# Patient Record
Sex: MALE | Race: OTHER | NOT HISPANIC OR LATINO
[De-identification: names, ages, dates, MRNs, and addresses within clinical notes are randomized per-mention and may not be internally consistent; named-entity substitution may affect disease eponyms.]

---

## 2017-01-30 ENCOUNTER — FORM ENCOUNTER (OUTPATIENT)
Age: 62
End: 2017-01-30

## 2017-01-31 ENCOUNTER — OUTPATIENT (OUTPATIENT)
Dept: OUTPATIENT SERVICES | Facility: HOSPITAL | Age: 62
LOS: 1 days | End: 2017-01-31
Payer: MEDICAID

## 2017-01-31 ENCOUNTER — APPOINTMENT (OUTPATIENT)
Dept: NUCLEAR MEDICINE | Facility: IMAGING CENTER | Age: 62
End: 2017-01-31

## 2017-01-31 ENCOUNTER — APPOINTMENT (OUTPATIENT)
Dept: CT IMAGING | Facility: IMAGING CENTER | Age: 62
End: 2017-01-31

## 2017-01-31 DIAGNOSIS — C22.1 INTRAHEPATIC BILE DUCT CARCINOMA: ICD-10-CM

## 2017-01-31 PROCEDURE — 74177 CT ABD & PELVIS W/CONTRAST: CPT

## 2017-03-03 ENCOUNTER — OUTPATIENT (OUTPATIENT)
Dept: OUTPATIENT SERVICES | Facility: HOSPITAL | Age: 62
LOS: 1 days | Discharge: HOME | End: 2017-03-03

## 2017-03-03 DIAGNOSIS — C22.1 INTRAHEPATIC BILE DUCT CARCINOMA: ICD-10-CM

## 2017-03-03 DIAGNOSIS — E55.9 VITAMIN D DEFICIENCY, UNSPECIFIED: ICD-10-CM

## 2017-03-06 ENCOUNTER — APPOINTMENT (OUTPATIENT)
Dept: HEMATOLOGY ONCOLOGY | Facility: CLINIC | Age: 62
End: 2017-03-06

## 2017-04-04 ENCOUNTER — RESULT REVIEW (OUTPATIENT)
Age: 62
End: 2017-04-04

## 2017-04-05 ENCOUNTER — APPOINTMENT (OUTPATIENT)
Dept: HEMATOLOGY ONCOLOGY | Facility: CLINIC | Age: 62
End: 2017-04-05

## 2017-04-05 ENCOUNTER — OUTPATIENT (OUTPATIENT)
Dept: OUTPATIENT SERVICES | Facility: HOSPITAL | Age: 62
LOS: 1 days | Discharge: HOME | End: 2017-04-05

## 2017-04-05 VITALS
HEART RATE: 88 BPM | TEMPERATURE: 97.8 F | OXYGEN SATURATION: 99 % | DIASTOLIC BLOOD PRESSURE: 80 MMHG | WEIGHT: 159.39 LBS | SYSTOLIC BLOOD PRESSURE: 136 MMHG | BODY MASS INDEX: 24.24 KG/M2 | RESPIRATION RATE: 16 BRPM

## 2017-04-05 DIAGNOSIS — C22.1 INTRAHEPATIC BILE DUCT CARCINOMA: ICD-10-CM

## 2017-04-05 LAB
BASOPHILS # BLD AUTO: 0 K/UL — SIGNIFICANT CHANGE UP (ref 0–0.2)
BASOPHILS NFR BLD AUTO: 0.5 % — SIGNIFICANT CHANGE UP (ref 0–2)
CEA SERPL-MCNC: 2.9 NG/ML
EOSINOPHIL # BLD AUTO: 0.2 K/UL — SIGNIFICANT CHANGE UP (ref 0–0.5)
EOSINOPHIL NFR BLD AUTO: 2.8 % — SIGNIFICANT CHANGE UP (ref 0–6)
HCT VFR BLD CALC: 41 % — SIGNIFICANT CHANGE UP (ref 39–50)
HGB BLD-MCNC: 14.3 G/DL — SIGNIFICANT CHANGE UP (ref 13–17)
LYMPHOCYTES # BLD AUTO: 1.5 K/UL — SIGNIFICANT CHANGE UP (ref 1–3.3)
LYMPHOCYTES # BLD AUTO: 21.6 % — SIGNIFICANT CHANGE UP (ref 13–44)
MCHC RBC-ENTMCNC: 31.2 PG — SIGNIFICANT CHANGE UP (ref 27–34)
MCHC RBC-ENTMCNC: 35 G/DL — SIGNIFICANT CHANGE UP (ref 32–36)
MCV RBC AUTO: 89 FL — SIGNIFICANT CHANGE UP (ref 80–100)
MONOCYTES # BLD AUTO: 0.8 K/UL — SIGNIFICANT CHANGE UP (ref 0–0.9)
MONOCYTES NFR BLD AUTO: 11 % — SIGNIFICANT CHANGE UP (ref 2–14)
NEUTROPHILS # BLD AUTO: 4.4 K/UL — SIGNIFICANT CHANGE UP (ref 1.8–7.4)
NEUTROPHILS NFR BLD AUTO: 64 % — SIGNIFICANT CHANGE UP (ref 43–77)
PLATELET # BLD AUTO: 199 K/UL — SIGNIFICANT CHANGE UP (ref 150–400)
RBC # BLD: 4.6 M/UL — SIGNIFICANT CHANGE UP (ref 4.2–5.8)
RBC # FLD: 11.9 % — SIGNIFICANT CHANGE UP (ref 10.3–14.5)
WBC # BLD: 6.9 K/UL — SIGNIFICANT CHANGE UP (ref 3.8–10.5)
WBC # FLD AUTO: 6.9 K/UL — SIGNIFICANT CHANGE UP (ref 3.8–10.5)

## 2017-04-06 LAB
ALBUMIN SERPL ELPH-MCNC: 3.9 G/DL
ALP BLD-CCNC: 110 U/L
ALT SERPL-CCNC: 17 U/L
ANION GAP SERPL CALC-SCNC: 14 MMOL/L
AST SERPL-CCNC: 17 U/L
BILIRUB SERPL-MCNC: 0.3 MG/DL
BUN SERPL-MCNC: 14 MG/DL
CALCIUM SERPL-MCNC: 8.9 MG/DL
CANCER AG19-9 SERPL-ACNC: 34.3 U/ML
CHLORIDE SERPL-SCNC: 104 MMOL/L
CO2 SERPL-SCNC: 25 MMOL/L
CREAT SERPL-MCNC: 0.7 MG/DL
GLUCOSE SERPL-MCNC: 115 MG/DL
POTASSIUM SERPL-SCNC: 4.3 MMOL/L
PROT SERPL-MCNC: 6.9 G/DL
SODIUM SERPL-SCNC: 143 MMOL/L

## 2017-07-04 ENCOUNTER — FORM ENCOUNTER (OUTPATIENT)
Age: 62
End: 2017-07-04

## 2017-07-05 ENCOUNTER — APPOINTMENT (OUTPATIENT)
Dept: CT IMAGING | Facility: IMAGING CENTER | Age: 62
End: 2017-07-05

## 2017-07-05 ENCOUNTER — OUTPATIENT (OUTPATIENT)
Dept: OUTPATIENT SERVICES | Facility: HOSPITAL | Age: 62
LOS: 1 days | End: 2017-07-05
Payer: MEDICAID

## 2017-07-05 DIAGNOSIS — C22.1 INTRAHEPATIC BILE DUCT CARCINOMA: ICD-10-CM

## 2017-07-05 PROCEDURE — 82565 ASSAY OF CREATININE: CPT

## 2017-07-05 PROCEDURE — 71260 CT THORAX DX C+: CPT

## 2017-07-05 PROCEDURE — 74177 CT ABD & PELVIS W/CONTRAST: CPT

## 2017-07-10 ENCOUNTER — OUTPATIENT (OUTPATIENT)
Dept: OUTPATIENT SERVICES | Facility: HOSPITAL | Age: 62
LOS: 1 days | Discharge: HOME | End: 2017-07-10

## 2017-07-10 ENCOUNTER — APPOINTMENT (OUTPATIENT)
Dept: HEMATOLOGY ONCOLOGY | Facility: CLINIC | Age: 62
End: 2017-07-10

## 2017-07-10 DIAGNOSIS — C22.1 INTRAHEPATIC BILE DUCT CARCINOMA: ICD-10-CM

## 2017-07-19 ENCOUNTER — RESULT REVIEW (OUTPATIENT)
Age: 62
End: 2017-07-19

## 2017-07-19 ENCOUNTER — APPOINTMENT (OUTPATIENT)
Dept: HEMATOLOGY ONCOLOGY | Facility: CLINIC | Age: 62
End: 2017-07-19

## 2017-07-19 VITALS
HEART RATE: 78 BPM | TEMPERATURE: 98.1 F | WEIGHT: 163.14 LBS | RESPIRATION RATE: 16 BRPM | DIASTOLIC BLOOD PRESSURE: 86 MMHG | OXYGEN SATURATION: 96 % | SYSTOLIC BLOOD PRESSURE: 162 MMHG | BODY MASS INDEX: 24.81 KG/M2

## 2017-07-19 LAB
BASOPHILS # BLD AUTO: 0 K/UL — SIGNIFICANT CHANGE UP (ref 0–0.2)
BASOPHILS NFR BLD AUTO: 0.6 % — SIGNIFICANT CHANGE UP (ref 0–2)
EOSINOPHIL # BLD AUTO: 0.3 K/UL — SIGNIFICANT CHANGE UP (ref 0–0.5)
EOSINOPHIL NFR BLD AUTO: 5.2 % — SIGNIFICANT CHANGE UP (ref 0–6)
HCT VFR BLD CALC: 40.8 % — SIGNIFICANT CHANGE UP (ref 39–50)
HGB BLD-MCNC: 13.7 G/DL — SIGNIFICANT CHANGE UP (ref 13–17)
LYMPHOCYTES # BLD AUTO: 2 K/UL — SIGNIFICANT CHANGE UP (ref 1–3.3)
LYMPHOCYTES # BLD AUTO: 33.1 % — SIGNIFICANT CHANGE UP (ref 13–44)
MCHC RBC-ENTMCNC: 29.3 PG — SIGNIFICANT CHANGE UP (ref 27–34)
MCHC RBC-ENTMCNC: 33.5 G/DL — SIGNIFICANT CHANGE UP (ref 32–36)
MCV RBC AUTO: 87.4 FL — SIGNIFICANT CHANGE UP (ref 80–100)
MONOCYTES # BLD AUTO: 0.6 K/UL — SIGNIFICANT CHANGE UP (ref 0–0.9)
MONOCYTES NFR BLD AUTO: 10.3 % — SIGNIFICANT CHANGE UP (ref 2–14)
NEUTROPHILS # BLD AUTO: 3 K/UL — SIGNIFICANT CHANGE UP (ref 1.8–7.4)
NEUTROPHILS NFR BLD AUTO: 50.8 % — SIGNIFICANT CHANGE UP (ref 43–77)
PLATELET # BLD AUTO: 212 K/UL — SIGNIFICANT CHANGE UP (ref 150–400)
RBC # BLD: 4.66 M/UL — SIGNIFICANT CHANGE UP (ref 4.2–5.8)
RBC # FLD: 13.3 % — SIGNIFICANT CHANGE UP (ref 10.3–14.5)
WBC # BLD: 6 K/UL — SIGNIFICANT CHANGE UP (ref 3.8–10.5)
WBC # FLD AUTO: 6 K/UL — SIGNIFICANT CHANGE UP (ref 3.8–10.5)

## 2017-11-22 ENCOUNTER — OUTPATIENT (OUTPATIENT)
Dept: OUTPATIENT SERVICES | Facility: HOSPITAL | Age: 62
LOS: 1 days | Discharge: HOME | End: 2017-11-22

## 2017-11-22 DIAGNOSIS — C22.1 INTRAHEPATIC BILE DUCT CARCINOMA: ICD-10-CM

## 2017-11-22 DIAGNOSIS — E55.9 VITAMIN D DEFICIENCY, UNSPECIFIED: ICD-10-CM

## 2017-11-24 ENCOUNTER — OTHER (OUTPATIENT)
Age: 62
End: 2017-11-24

## 2017-11-29 ENCOUNTER — RESULT REVIEW (OUTPATIENT)
Age: 62
End: 2017-11-29

## 2017-11-29 ENCOUNTER — APPOINTMENT (OUTPATIENT)
Dept: HEMATOLOGY ONCOLOGY | Facility: CLINIC | Age: 62
End: 2017-11-29
Payer: MEDICAID

## 2017-11-29 VITALS
OXYGEN SATURATION: 100 % | HEART RATE: 74 BPM | DIASTOLIC BLOOD PRESSURE: 84 MMHG | SYSTOLIC BLOOD PRESSURE: 153 MMHG | RESPIRATION RATE: 16 BRPM | BODY MASS INDEX: 24.64 KG/M2 | TEMPERATURE: 98 F | WEIGHT: 162.04 LBS

## 2017-11-29 LAB
BASOPHILS # BLD AUTO: 0.1 K/UL — SIGNIFICANT CHANGE UP (ref 0–0.2)
BASOPHILS NFR BLD AUTO: 1 % — SIGNIFICANT CHANGE UP (ref 0–2)
EOSINOPHIL # BLD AUTO: 0.4 K/UL — SIGNIFICANT CHANGE UP (ref 0–0.5)
EOSINOPHIL NFR BLD AUTO: 4.5 % — SIGNIFICANT CHANGE UP (ref 0–6)
HCT VFR BLD CALC: 42.2 % — SIGNIFICANT CHANGE UP (ref 39–50)
HGB BLD-MCNC: 14.3 G/DL — SIGNIFICANT CHANGE UP (ref 13–17)
LYMPHOCYTES # BLD AUTO: 2 K/UL — SIGNIFICANT CHANGE UP (ref 1–3.3)
LYMPHOCYTES # BLD AUTO: 26.3 % — SIGNIFICANT CHANGE UP (ref 13–44)
MCHC RBC-ENTMCNC: 30.3 PG — SIGNIFICANT CHANGE UP (ref 27–34)
MCHC RBC-ENTMCNC: 33.9 G/DL — SIGNIFICANT CHANGE UP (ref 32–36)
MCV RBC AUTO: 89.5 FL — SIGNIFICANT CHANGE UP (ref 80–100)
MONOCYTES # BLD AUTO: 0.6 K/UL — SIGNIFICANT CHANGE UP (ref 0–0.9)
MONOCYTES NFR BLD AUTO: 7.2 % — SIGNIFICANT CHANGE UP (ref 2–14)
NEUTROPHILS # BLD AUTO: 4.7 K/UL — SIGNIFICANT CHANGE UP (ref 1.8–7.4)
NEUTROPHILS NFR BLD AUTO: 61 % — SIGNIFICANT CHANGE UP (ref 43–77)
PLATELET # BLD AUTO: 213 K/UL — SIGNIFICANT CHANGE UP (ref 150–400)
RBC # BLD: 4.72 M/UL — SIGNIFICANT CHANGE UP (ref 4.2–5.8)
RBC # FLD: 11.8 % — SIGNIFICANT CHANGE UP (ref 10.3–14.5)
WBC # BLD: 7.7 K/UL — SIGNIFICANT CHANGE UP (ref 3.8–10.5)
WBC # FLD AUTO: 7.7 K/UL — SIGNIFICANT CHANGE UP (ref 3.8–10.5)

## 2017-11-29 PROCEDURE — 99214 OFFICE O/P EST MOD 30 MIN: CPT

## 2017-12-01 ENCOUNTER — FORM ENCOUNTER (OUTPATIENT)
Age: 62
End: 2017-12-01

## 2017-12-02 ENCOUNTER — APPOINTMENT (OUTPATIENT)
Dept: ULTRASOUND IMAGING | Facility: IMAGING CENTER | Age: 62
End: 2017-12-02
Payer: MEDICAID

## 2017-12-02 ENCOUNTER — OUTPATIENT (OUTPATIENT)
Dept: OUTPATIENT SERVICES | Facility: HOSPITAL | Age: 62
LOS: 1 days | End: 2017-12-02
Payer: MEDICAID

## 2017-12-02 DIAGNOSIS — C22.1 INTRAHEPATIC BILE DUCT CARCINOMA: ICD-10-CM

## 2017-12-02 PROCEDURE — 76882 US LMTD JT/FCL EVL NVASC XTR: CPT

## 2017-12-02 PROCEDURE — 76882 US LMTD JT/FCL EVL NVASC XTR: CPT | Mod: 26,LT

## 2018-04-06 ENCOUNTER — OUTPATIENT (OUTPATIENT)
Dept: OUTPATIENT SERVICES | Facility: HOSPITAL | Age: 63
LOS: 1 days | Discharge: HOME | End: 2018-04-06

## 2018-04-06 DIAGNOSIS — E55.9 VITAMIN D DEFICIENCY, UNSPECIFIED: ICD-10-CM

## 2018-04-06 DIAGNOSIS — C22.1 INTRAHEPATIC BILE DUCT CARCINOMA: ICD-10-CM

## 2018-04-11 ENCOUNTER — RESULT REVIEW (OUTPATIENT)
Age: 63
End: 2018-04-11

## 2018-04-11 ENCOUNTER — OTHER (OUTPATIENT)
Age: 63
End: 2018-04-11

## 2018-04-11 ENCOUNTER — APPOINTMENT (OUTPATIENT)
Dept: HEMATOLOGY ONCOLOGY | Facility: CLINIC | Age: 63
End: 2018-04-11
Payer: MEDICAID

## 2018-04-11 VITALS
SYSTOLIC BLOOD PRESSURE: 148 MMHG | BODY MASS INDEX: 25.7 KG/M2 | OXYGEN SATURATION: 99 % | TEMPERATURE: 98.7 F | WEIGHT: 168.98 LBS | HEART RATE: 94 BPM | RESPIRATION RATE: 16 BRPM | DIASTOLIC BLOOD PRESSURE: 84 MMHG

## 2018-04-11 LAB
BASOPHILS # BLD AUTO: 0 K/UL — SIGNIFICANT CHANGE UP (ref 0–0.2)
BASOPHILS NFR BLD AUTO: 0.2 % — SIGNIFICANT CHANGE UP (ref 0–2)
EOSINOPHIL # BLD AUTO: 0.3 K/UL — SIGNIFICANT CHANGE UP (ref 0–0.5)
EOSINOPHIL NFR BLD AUTO: 3.9 % — SIGNIFICANT CHANGE UP (ref 0–6)
HCT VFR BLD CALC: 41.7 % — SIGNIFICANT CHANGE UP (ref 39–50)
HGB BLD-MCNC: 14.4 G/DL — SIGNIFICANT CHANGE UP (ref 13–17)
LYMPHOCYTES # BLD AUTO: 1.7 K/UL — SIGNIFICANT CHANGE UP (ref 1–3.3)
LYMPHOCYTES # BLD AUTO: 21.4 % — SIGNIFICANT CHANGE UP (ref 13–44)
MCHC RBC-ENTMCNC: 30.5 PG — SIGNIFICANT CHANGE UP (ref 27–34)
MCHC RBC-ENTMCNC: 34.6 G/DL — SIGNIFICANT CHANGE UP (ref 32–36)
MCV RBC AUTO: 87.9 FL — SIGNIFICANT CHANGE UP (ref 80–100)
MONOCYTES # BLD AUTO: 0.7 K/UL — SIGNIFICANT CHANGE UP (ref 0–0.9)
MONOCYTES NFR BLD AUTO: 8.7 % — SIGNIFICANT CHANGE UP (ref 2–14)
NEUTROPHILS # BLD AUTO: 5.3 K/UL — SIGNIFICANT CHANGE UP (ref 1.8–7.4)
NEUTROPHILS NFR BLD AUTO: 65.7 % — SIGNIFICANT CHANGE UP (ref 43–77)
PLATELET # BLD AUTO: 214 K/UL — SIGNIFICANT CHANGE UP (ref 150–400)
RBC # BLD: 4.74 M/UL — SIGNIFICANT CHANGE UP (ref 4.2–5.8)
RBC # FLD: 12.2 % — SIGNIFICANT CHANGE UP (ref 10.3–14.5)
WBC # BLD: 8 K/UL — SIGNIFICANT CHANGE UP (ref 3.8–10.5)
WBC # FLD AUTO: 8 K/UL — SIGNIFICANT CHANGE UP (ref 3.8–10.5)

## 2018-04-11 PROCEDURE — 99214 OFFICE O/P EST MOD 30 MIN: CPT

## 2018-04-17 LAB
ALBUMIN SERPL ELPH-MCNC: 3.8 G/DL
ALP BLD-CCNC: 105 U/L
ALT SERPL-CCNC: 17 U/L
ANION GAP SERPL CALC-SCNC: 9 MMOL/L
AST SERPL-CCNC: 17 U/L
BILIRUB SERPL-MCNC: 0.3 MG/DL
BUN SERPL-MCNC: 13 MG/DL
CALCIUM SERPL-MCNC: 8.8 MG/DL
CANCER AG19-9 SERPL-ACNC: 39.2 U/ML
CHLORIDE SERPL-SCNC: 103 MMOL/L
CO2 SERPL-SCNC: 29 MMOL/L
CREAT SERPL-MCNC: 0.8 MG/DL
GLUCOSE SERPL-MCNC: 89 MG/DL
POTASSIUM SERPL-SCNC: 4.8 MMOL/L
PROT SERPL-MCNC: 7.3 G/DL
SODIUM SERPL-SCNC: 141 MMOL/L

## 2018-04-18 ENCOUNTER — MESSAGE (OUTPATIENT)
Age: 63
End: 2018-04-18

## 2018-04-26 ENCOUNTER — APPOINTMENT (OUTPATIENT)
Dept: SURGICAL ONCOLOGY | Facility: CLINIC | Age: 63
End: 2018-04-26
Payer: MEDICAID

## 2018-04-26 VITALS
HEART RATE: 71 BPM | RESPIRATION RATE: 15 BRPM | DIASTOLIC BLOOD PRESSURE: 87 MMHG | HEIGHT: 68 IN | SYSTOLIC BLOOD PRESSURE: 157 MMHG | BODY MASS INDEX: 25.76 KG/M2 | WEIGHT: 170 LBS

## 2018-04-26 PROCEDURE — 99203 OFFICE O/P NEW LOW 30 MIN: CPT

## 2018-04-27 ENCOUNTER — OTHER (OUTPATIENT)
Age: 63
End: 2018-04-27

## 2018-04-29 ENCOUNTER — FORM ENCOUNTER (OUTPATIENT)
Age: 63
End: 2018-04-29

## 2018-04-30 ENCOUNTER — OUTPATIENT (OUTPATIENT)
Dept: OUTPATIENT SERVICES | Facility: HOSPITAL | Age: 63
LOS: 1 days | End: 2018-04-30
Payer: MEDICAID

## 2018-04-30 ENCOUNTER — APPOINTMENT (OUTPATIENT)
Dept: CT IMAGING | Facility: IMAGING CENTER | Age: 63
End: 2018-04-30
Payer: MEDICAID

## 2018-04-30 ENCOUNTER — APPOINTMENT (OUTPATIENT)
Dept: ULTRASOUND IMAGING | Facility: IMAGING CENTER | Age: 63
End: 2018-04-30
Payer: MEDICAID

## 2018-04-30 DIAGNOSIS — C22.1 INTRAHEPATIC BILE DUCT CARCINOMA: ICD-10-CM

## 2018-04-30 PROCEDURE — 74160 CT ABDOMEN W/CONTRAST: CPT

## 2018-04-30 PROCEDURE — 71260 CT THORAX DX C+: CPT

## 2018-04-30 PROCEDURE — 74160 CT ABDOMEN W/CONTRAST: CPT | Mod: 26

## 2018-04-30 PROCEDURE — 76942 ECHO GUIDE FOR BIOPSY: CPT | Mod: 26

## 2018-04-30 PROCEDURE — 71260 CT THORAX DX C+: CPT | Mod: 26,59

## 2018-04-30 PROCEDURE — 71260 CT THORAX DX C+: CPT | Mod: 26

## 2018-04-30 PROCEDURE — 82565 ASSAY OF CREATININE: CPT

## 2018-04-30 PROCEDURE — 10022: CPT

## 2018-04-30 PROCEDURE — 76942 ECHO GUIDE FOR BIOPSY: CPT

## 2018-05-14 ENCOUNTER — OTHER (OUTPATIENT)
Age: 63
End: 2018-05-14

## 2018-05-22 ENCOUNTER — CHART COPY (OUTPATIENT)
Age: 63
End: 2018-05-22

## 2018-06-07 ENCOUNTER — APPOINTMENT (OUTPATIENT)
Dept: SURGICAL ONCOLOGY | Facility: CLINIC | Age: 63
End: 2018-06-07
Payer: MEDICAID

## 2018-06-07 VITALS
RESPIRATION RATE: 13 BRPM | SYSTOLIC BLOOD PRESSURE: 165 MMHG | BODY MASS INDEX: 25.61 KG/M2 | OXYGEN SATURATION: 98 % | HEIGHT: 68 IN | DIASTOLIC BLOOD PRESSURE: 89 MMHG | WEIGHT: 169 LBS | HEART RATE: 90 BPM

## 2018-06-07 PROCEDURE — 99214 OFFICE O/P EST MOD 30 MIN: CPT

## 2018-07-05 ENCOUNTER — OUTPATIENT (OUTPATIENT)
Dept: OUTPATIENT SERVICES | Facility: HOSPITAL | Age: 63
LOS: 1 days | Discharge: HOME | End: 2018-07-05

## 2018-07-05 DIAGNOSIS — E55.9 VITAMIN D DEFICIENCY, UNSPECIFIED: ICD-10-CM

## 2018-07-05 DIAGNOSIS — C22.1 INTRAHEPATIC BILE DUCT CARCINOMA: ICD-10-CM

## 2018-07-11 ENCOUNTER — APPOINTMENT (OUTPATIENT)
Dept: HEMATOLOGY ONCOLOGY | Facility: CLINIC | Age: 63
End: 2018-07-11
Payer: MEDICAID

## 2018-07-11 ENCOUNTER — OTHER (OUTPATIENT)
Age: 63
End: 2018-07-11

## 2018-07-11 ENCOUNTER — RESULT REVIEW (OUTPATIENT)
Age: 63
End: 2018-07-11

## 2018-07-11 VITALS
DIASTOLIC BLOOD PRESSURE: 82 MMHG | BODY MASS INDEX: 26.21 KG/M2 | OXYGEN SATURATION: 98 % | WEIGHT: 172.4 LBS | HEART RATE: 87 BPM | TEMPERATURE: 98.4 F | RESPIRATION RATE: 16 BRPM | SYSTOLIC BLOOD PRESSURE: 155 MMHG

## 2018-07-11 LAB
BASOPHILS # BLD AUTO: 0.1 K/UL — SIGNIFICANT CHANGE UP (ref 0–0.2)
BASOPHILS NFR BLD AUTO: 1 % — SIGNIFICANT CHANGE UP (ref 0–2)
EOSINOPHIL # BLD AUTO: 0.3 K/UL — SIGNIFICANT CHANGE UP (ref 0–0.5)
EOSINOPHIL NFR BLD AUTO: 4.4 % — SIGNIFICANT CHANGE UP (ref 0–6)
HCT VFR BLD CALC: 41.7 % — SIGNIFICANT CHANGE UP (ref 39–50)
HGB BLD-MCNC: 14.5 G/DL — SIGNIFICANT CHANGE UP (ref 13–17)
LYMPHOCYTES # BLD AUTO: 1.8 K/UL — SIGNIFICANT CHANGE UP (ref 1–3.3)
LYMPHOCYTES # BLD AUTO: 28 % — SIGNIFICANT CHANGE UP (ref 13–44)
MCHC RBC-ENTMCNC: 30.7 PG — SIGNIFICANT CHANGE UP (ref 27–34)
MCHC RBC-ENTMCNC: 34.7 G/DL — SIGNIFICANT CHANGE UP (ref 32–36)
MCV RBC AUTO: 88.4 FL — SIGNIFICANT CHANGE UP (ref 80–100)
MONOCYTES # BLD AUTO: 0.5 K/UL — SIGNIFICANT CHANGE UP (ref 0–0.9)
MONOCYTES NFR BLD AUTO: 8.6 % — SIGNIFICANT CHANGE UP (ref 2–14)
NEUTROPHILS # BLD AUTO: 3.7 K/UL — SIGNIFICANT CHANGE UP (ref 1.8–7.4)
NEUTROPHILS NFR BLD AUTO: 58 % — SIGNIFICANT CHANGE UP (ref 43–77)
PLATELET # BLD AUTO: 218 K/UL — SIGNIFICANT CHANGE UP (ref 150–400)
RBC # BLD: 4.72 M/UL — SIGNIFICANT CHANGE UP (ref 4.2–5.8)
RBC # FLD: 12.1 % — SIGNIFICANT CHANGE UP (ref 10.3–14.5)
WBC # BLD: 6.4 K/UL — SIGNIFICANT CHANGE UP (ref 3.8–10.5)
WBC # FLD AUTO: 6.4 K/UL — SIGNIFICANT CHANGE UP (ref 3.8–10.5)

## 2018-07-11 PROCEDURE — 99214 OFFICE O/P EST MOD 30 MIN: CPT

## 2018-07-13 LAB
ALBUMIN SERPL ELPH-MCNC: 4.1 G/DL
ALP BLD-CCNC: 98 U/L
ALT SERPL-CCNC: 16 U/L
ANION GAP SERPL CALC-SCNC: 13 MMOL/L
AST SERPL-CCNC: 16 U/L
BILIRUB SERPL-MCNC: 0.4 MG/DL
BUN SERPL-MCNC: 11 MG/DL
CALCIUM SERPL-MCNC: 8.5 MG/DL
CANCER AG19-9 SERPL-ACNC: 38.6 U/ML
CHLORIDE SERPL-SCNC: 102 MMOL/L
CO2 SERPL-SCNC: 29 MMOL/L
CREAT SERPL-MCNC: 0.71 MG/DL
GLUCOSE SERPL-MCNC: 139 MG/DL
POTASSIUM SERPL-SCNC: 4.4 MMOL/L
PROT SERPL-MCNC: 7.1 G/DL
SODIUM SERPL-SCNC: 144 MMOL/L

## 2018-11-23 ENCOUNTER — OUTPATIENT (OUTPATIENT)
Dept: OUTPATIENT SERVICES | Facility: HOSPITAL | Age: 63
LOS: 1 days | Discharge: HOME | End: 2018-11-23

## 2018-11-23 DIAGNOSIS — E55.9 VITAMIN D DEFICIENCY, UNSPECIFIED: ICD-10-CM

## 2018-11-23 DIAGNOSIS — C22.1 INTRAHEPATIC BILE DUCT CARCINOMA: ICD-10-CM

## 2018-11-26 ENCOUNTER — APPOINTMENT (OUTPATIENT)
Dept: HEMATOLOGY ONCOLOGY | Facility: CLINIC | Age: 63
End: 2018-11-26
Payer: MEDICAID

## 2018-11-26 ENCOUNTER — RESULT REVIEW (OUTPATIENT)
Age: 63
End: 2018-11-26

## 2018-11-26 VITALS
HEART RATE: 80 BPM | BODY MASS INDEX: 27.19 KG/M2 | RESPIRATION RATE: 16 BRPM | TEMPERATURE: 98.5 F | WEIGHT: 178.79 LBS | DIASTOLIC BLOOD PRESSURE: 92 MMHG | OXYGEN SATURATION: 99 % | SYSTOLIC BLOOD PRESSURE: 145 MMHG

## 2018-11-26 LAB
BASOPHILS # BLD AUTO: 0.1 K/UL — SIGNIFICANT CHANGE UP (ref 0–0.2)
EOSINOPHIL # BLD AUTO: 0.6 K/UL — HIGH (ref 0–0.5)
EOSINOPHIL NFR BLD AUTO: 4 % — SIGNIFICANT CHANGE UP (ref 0–6)
HCT VFR BLD CALC: 43.7 % — SIGNIFICANT CHANGE UP (ref 39–50)
HGB BLD-MCNC: 14.8 G/DL — SIGNIFICANT CHANGE UP (ref 13–17)
LYMPHOCYTES # BLD AUTO: 2.8 K/UL — SIGNIFICANT CHANGE UP (ref 1–3.3)
LYMPHOCYTES # BLD AUTO: 28 % — SIGNIFICANT CHANGE UP (ref 13–44)
MCHC RBC-ENTMCNC: 29.6 PG — SIGNIFICANT CHANGE UP (ref 27–34)
MCHC RBC-ENTMCNC: 33.8 G/DL — SIGNIFICANT CHANGE UP (ref 32–36)
MCV RBC AUTO: 87.6 FL — SIGNIFICANT CHANGE UP (ref 80–100)
MONOCYTES # BLD AUTO: 0.8 K/UL — SIGNIFICANT CHANGE UP (ref 0–0.9)
MONOCYTES NFR BLD AUTO: 8 % — SIGNIFICANT CHANGE UP (ref 2–14)
NEUTROPHILS # BLD AUTO: 4.2 K/UL — SIGNIFICANT CHANGE UP (ref 1.8–7.4)
NEUTROPHILS NFR BLD AUTO: 60 % — SIGNIFICANT CHANGE UP (ref 43–77)
PLAT MORPH BLD: NORMAL — SIGNIFICANT CHANGE UP
PLATELET # BLD AUTO: 278 K/UL — SIGNIFICANT CHANGE UP (ref 150–400)
RBC # BLD: 4.99 M/UL — SIGNIFICANT CHANGE UP (ref 4.2–5.8)
RBC # FLD: 12.2 % — SIGNIFICANT CHANGE UP (ref 10.3–14.5)
RBC BLD AUTO: SIGNIFICANT CHANGE UP
WBC # BLD: 8.4 K/UL — SIGNIFICANT CHANGE UP (ref 3.8–10.5)
WBC # FLD AUTO: 8.4 K/UL — SIGNIFICANT CHANGE UP (ref 3.8–10.5)

## 2018-11-26 PROCEDURE — 99213 OFFICE O/P EST LOW 20 MIN: CPT

## 2018-11-27 ENCOUNTER — APPOINTMENT (OUTPATIENT)
Dept: SURGICAL ONCOLOGY | Facility: CLINIC | Age: 63
End: 2018-11-27
Payer: MEDICAID

## 2018-11-27 VITALS
DIASTOLIC BLOOD PRESSURE: 92 MMHG | BODY MASS INDEX: 26.98 KG/M2 | HEIGHT: 68 IN | WEIGHT: 178 LBS | OXYGEN SATURATION: 99 % | HEART RATE: 82 BPM | SYSTOLIC BLOOD PRESSURE: 168 MMHG

## 2018-11-27 LAB
ALBUMIN SERPL ELPH-MCNC: 4.3 G/DL
ALP BLD-CCNC: 110 U/L
ALT SERPL-CCNC: 16 U/L
ANION GAP SERPL CALC-SCNC: 11 MMOL/L
AST SERPL-CCNC: 18 U/L
BILIRUB SERPL-MCNC: 0.3 MG/DL
BUN SERPL-MCNC: 13 MG/DL
CALCIUM SERPL-MCNC: 9.2 MG/DL
CANCER AG19-9 SERPL-ACNC: 38.2 U/ML
CEA SERPL-MCNC: 3 NG/ML
CHLORIDE SERPL-SCNC: 103 MMOL/L
CO2 SERPL-SCNC: 27 MMOL/L
CREAT SERPL-MCNC: 0.85 MG/DL
GLUCOSE SERPL-MCNC: 78 MG/DL
POTASSIUM SERPL-SCNC: 4.5 MMOL/L
PROT SERPL-MCNC: 7.4 G/DL
SODIUM SERPL-SCNC: 141 MMOL/L

## 2018-11-27 PROCEDURE — 99212 OFFICE O/P EST SF 10 MIN: CPT

## 2018-11-27 NOTE — HISTORY OF PRESENT ILLNESS
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 His history dates back 6 weeks ago when he noted the onset of severe itching. He was seen in the emergency room on December 23 with jaundice and CAT scan revealed marked intrahepatic biliary ductal dilatation to the level of the biliary confluence. The CAT scan of the chest revealed a 6 mm linear opacity in the right upper lobe. MRI of the abdomen confirmed a biliary ductal dilatation to the level of the biliary hilum with an area of delayed enhancement measuring 3 x 1.9 cm. Thrombosis of the left portal vein was seen. 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 The patient underwent stent placement which improved his itching and jaundice. Cytology was obtained which was consistent with carcinoma.\par \par On January 5 the patient underwent partial hepatectomy including the left lobe and  gallbladder, removal of the extrahepatic bile duct, hepaticojejunostomy to the Ju-en-Y, reconstruction of the portal vein with saphenous vein patch. The pathology revealed moderate to poorly differentiated cholangiocarcinoma involving the perihilar bile ducts. This tumor extended to the wall of the portal vein. The hepatic margin of resection was negative. 2 lymph nodes were negative. The bladder was nonmalignant. The tumor extended into the adjacent hepatic parenchyma, into the surrounding adipose tissue and extended into the interlobular bile ducts with extensive perineural invasion. The bile duct margins were negative. The tumor measured 2.9 x 2.4 cm. Staging was T3 N0.\par \par Patient did well postoperatively. Overall he lost about 20 pounds but now his appetite is improving and he feels stronger. His wound is healing well and he denies nausea, vomiting or diarrhea. There is no previous history of prior liver disease, gallbladder disease or GI disease. There is no family yesterday of malignancy. The patient works as a [employer] . [de-identified] : Patient is here for 6 months f/u. Presently, he feels okay. Denies any weight loss. Appetite is good. Denies any abdominal pain, nausea or vomiting. He does have a recurrent lump to left thigh that has returned after drainage of seroma was done last June.

## 2018-11-27 NOTE — PHYSICAL EXAM
[Normal] : affect appropriate [de-identified] : + mobile 4 cm lump vs cyst to left mid-thigh, nontender

## 2018-11-27 NOTE — ASSESSMENT
[FreeTextEntry1] : Pt to continue surveillance for recurrence of his cholangioca. Pt had CT scan in April 2018 and neg for metastases. Ordered repeat CT-Scan abdomen/pelvis/chest f/u. Labs ordered f/u.  Pt to continue medical exams. Advised pt to make an appointment for evaluate of recurrent left thigh seroma with Dr. Griffin Chávez. Pt to RTO in 6 months .

## 2018-12-04 ENCOUNTER — INBOUND DOCUMENT (OUTPATIENT)
Age: 63
End: 2018-12-04

## 2018-12-06 ENCOUNTER — FORM ENCOUNTER (OUTPATIENT)
Age: 63
End: 2018-12-06

## 2018-12-07 ENCOUNTER — APPOINTMENT (OUTPATIENT)
Dept: CT IMAGING | Facility: IMAGING CENTER | Age: 63
End: 2018-12-07
Payer: MEDICAID

## 2018-12-07 ENCOUNTER — OUTPATIENT (OUTPATIENT)
Dept: OUTPATIENT SERVICES | Facility: HOSPITAL | Age: 63
LOS: 1 days | End: 2018-12-07
Payer: MEDICAID

## 2018-12-07 DIAGNOSIS — C22.1 INTRAHEPATIC BILE DUCT CARCINOMA: ICD-10-CM

## 2018-12-07 PROCEDURE — 71260 CT THORAX DX C+: CPT | Mod: 26

## 2018-12-07 PROCEDURE — 74177 CT ABD & PELVIS W/CONTRAST: CPT

## 2018-12-07 PROCEDURE — 82565 ASSAY OF CREATININE: CPT

## 2018-12-07 PROCEDURE — 71260 CT THORAX DX C+: CPT

## 2018-12-07 PROCEDURE — 74177 CT ABD & PELVIS W/CONTRAST: CPT | Mod: 26

## 2019-01-03 LAB
24R-OH-CALCIDIOL SERPL-MCNC: 71.7 PG/ML
ALBUMIN SERPL ELPH-MCNC: 4.2 G/DL
ALBUMIN SERPL ELPH-MCNC: 4.3 G/DL
ALP BLD-CCNC: 101 U/L
ALP BLD-CCNC: 110 U/L
ALT SERPL-CCNC: 14 U/L
ALT SERPL-CCNC: 15 U/L
ANION GAP SERPL CALC-SCNC: 12 MMOL/L
ANION GAP SERPL CALC-SCNC: 12 MMOL/L
AST SERPL-CCNC: 17 U/L
AST SERPL-CCNC: 19 U/L
BILIRUB SERPL-MCNC: 0.4 MG/DL
BILIRUB SERPL-MCNC: 0.4 MG/DL
BUN SERPL-MCNC: 10 MG/DL
BUN SERPL-MCNC: 12 MG/DL
CALCIUM SERPL-MCNC: 8.9 MG/DL
CALCIUM SERPL-MCNC: 9.4 MG/DL
CANCER AG19-9 SERPL-ACNC: 45.1 U/ML
CANCER AG19-9 SERPL-ACNC: 48.3 U/ML
CHLORIDE SERPL-SCNC: 101 MMOL/L
CHLORIDE SERPL-SCNC: 103 MMOL/L
CO2 SERPL-SCNC: 28 MMOL/L
CO2 SERPL-SCNC: 29 MMOL/L
CREAT SERPL-MCNC: 0.74 MG/DL
CREAT SERPL-MCNC: 0.88 MG/DL
GLUCOSE SERPL-MCNC: 76 MG/DL
GLUCOSE SERPL-MCNC: 77 MG/DL
POTASSIUM SERPL-SCNC: 4.5 MMOL/L
POTASSIUM SERPL-SCNC: 4.8 MMOL/L
PROT SERPL-MCNC: 7.3 G/DL
PROT SERPL-MCNC: 7.6 G/DL
SODIUM SERPL-SCNC: 142 MMOL/L
SODIUM SERPL-SCNC: 143 MMOL/L

## 2019-06-03 ENCOUNTER — OUTPATIENT (OUTPATIENT)
Dept: OUTPATIENT SERVICES | Facility: HOSPITAL | Age: 64
LOS: 1 days | Discharge: HOME | End: 2019-06-03

## 2019-06-03 DIAGNOSIS — C22.1 INTRAHEPATIC BILE DUCT CARCINOMA: ICD-10-CM

## 2019-06-04 ENCOUNTER — APPOINTMENT (OUTPATIENT)
Dept: HEMATOLOGY ONCOLOGY | Facility: CLINIC | Age: 64
End: 2019-06-04
Payer: MEDICAID

## 2019-06-04 VITALS
DIASTOLIC BLOOD PRESSURE: 90 MMHG | OXYGEN SATURATION: 99 % | SYSTOLIC BLOOD PRESSURE: 164 MMHG | HEART RATE: 74 BPM | WEIGHT: 179.65 LBS | BODY MASS INDEX: 27.32 KG/M2 | RESPIRATION RATE: 18 BRPM | TEMPERATURE: 98.4 F

## 2019-06-04 PROCEDURE — 99214 OFFICE O/P EST MOD 30 MIN: CPT

## 2019-06-04 NOTE — ASSESSMENT
[Palliative Care Plan] : not applicable at this time [Curative] : Goals of care discussed with patient: Curative [FreeTextEntry1] : A discussion took place regarding followup evaluation for his episode of hemoptysis. Most likely this represents, after severe heavy coughing however he did recommend he undergo evaluation including a pulmonary consultation and x-ray. His CAT scan in December did not reveal any lung lesions. He'll continue to be monitored with repeat blood testing and repeat CAT scans of the chest abdomen and pelvis to assess for continued response or progression of his disease.

## 2019-06-04 NOTE — HISTORY OF PRESENT ILLNESS
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 His history dates back 6 weeks ago when he noted the onset of severe itching. He was seen in the emergency room on December 23 with jaundice and CAT scan revealed marked intrahepatic biliary ductal dilatation to the level of the biliary confluence. The CAT scan of the chest revealed a 6 mm linear opacity in the right upper lobe. MRI of the abdomen confirmed a biliary ductal dilatation to the level of the biliary hilum with an area of delayed enhancement measuring 3 x 1.9 cm. Thrombosis of the left portal vein was seen. 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 The patient underwent stent placement which improved his itching and jaundice. Cytology was obtained which was consistent with carcinoma.\par \par On January 5 the patient underwent partial hepatectomy including the left lobe and  gallbladder, removal of the extrahepatic bile duct, hepaticojejunostomy to the Ju-en-Y, reconstruction of the portal vein with saphenous vein patch. The pathology revealed moderate to poorly differentiated cholangiocarcinoma involving the perihilar bile ducts. This tumor extended to the wall of the portal vein. The hepatic margin of resection was negative. 2 lymph nodes were negative. The bladder was nonmalignant. The tumor extended into the adjacent hepatic parenchyma, into the surrounding adipose tissue and extended into the interlobular bile ducts with extensive perineural invasion. The bile duct margins were negative. The tumor measured 2.9 x 2.4 cm. Staging was T3 N0.\par \par Patient did well postoperatively. Overall he lost about 20 pounds but now his appetite is improving and he feels stronger. His wound is healing well and he denies nausea, vomiting or diarrhea. There is no previous history of prior liver disease, gallbladder disease or GI disease. There is no family yesterday of malignancy. The patient works as a [employer] . [de-identified] : Since her last visit the patient remains well and had one episode of hemoptysis after heavy coughing. He denies chest pain shortness of breath or fever,

## 2019-12-11 ENCOUNTER — APPOINTMENT (OUTPATIENT)
Dept: PULMONOLOGY | Facility: CLINIC | Age: 64
End: 2019-12-11
Payer: MEDICAID

## 2019-12-11 VITALS
HEART RATE: 81 BPM | WEIGHT: 180 LBS | HEIGHT: 64 IN | BODY MASS INDEX: 30.73 KG/M2 | RESPIRATION RATE: 17 BRPM | OXYGEN SATURATION: 98 % | SYSTOLIC BLOOD PRESSURE: 130 MMHG | DIASTOLIC BLOOD PRESSURE: 75 MMHG

## 2019-12-11 DIAGNOSIS — Z78.9 OTHER SPECIFIED HEALTH STATUS: ICD-10-CM

## 2019-12-11 DIAGNOSIS — K21.9 GASTRO-ESOPHAGEAL REFLUX DISEASE W/OUT ESOPHAGITIS: ICD-10-CM

## 2019-12-11 DIAGNOSIS — R06.83 SNORING: ICD-10-CM

## 2019-12-11 DIAGNOSIS — E66.3 OVERWEIGHT: ICD-10-CM

## 2019-12-11 DIAGNOSIS — E55.9 VITAMIN D DEFICIENCY, UNSPECIFIED: ICD-10-CM

## 2019-12-11 DIAGNOSIS — Z83.3 FAMILY HISTORY OF DIABETES MELLITUS: ICD-10-CM

## 2019-12-11 DIAGNOSIS — Z82.49 FAMILY HISTORY OF ISCHEMIC HEART DISEASE AND OTHER DISEASES OF THE CIRCULATORY SYSTEM: ICD-10-CM

## 2019-12-11 DIAGNOSIS — R06.02 SHORTNESS OF BREATH: ICD-10-CM

## 2019-12-11 DIAGNOSIS — Z81.8 FAMILY HISTORY OF OTHER MENTAL AND BEHAVIORAL DISORDERS: ICD-10-CM

## 2019-12-11 PROCEDURE — 71046 X-RAY EXAM CHEST 2 VIEWS: CPT

## 2019-12-11 PROCEDURE — ZZZZZ: CPT

## 2019-12-11 PROCEDURE — 99204 OFFICE O/P NEW MOD 45 MIN: CPT | Mod: 25

## 2019-12-11 PROCEDURE — 94618 PULMONARY STRESS TESTING: CPT

## 2019-12-11 NOTE — PROCEDURE
[FreeTextEntry1] : CXR reveals a normal sized heart; 6-7 mm MAYRA nodule\par \par Chest CT (December 7, 2018) reveals patent central airways. Bilateral upper lobe linear scarring is unchanged. Punctate bilateral calcified granulomas are unchanged. A 2 mm left upper lobe nodule (series 3 image 39) is also unchanged. No new pulmonary nodules.\par \par 6 minute walk test reveals a low saturation of 96% with no evidence of dyspnea or fatigue; walked 570.5 meters \par \par PFT - spi reveals normal flows; FEV1 is 2.28 which is 91% of predicted, normal flow volume loop; 11% improvement with BD at mid-low lung volumes. Normal Lung Volumes / Diffusion, DLCO is 21.8 which is 104% of predicted.

## 2019-12-11 NOTE — ADDENDUM
[FreeTextEntry1] : All medical record entries made by cynthia Avery were at Dr. Oswald Barry's direction and personally dictated by me on 12/11/2019. I have reviewed the chart and agree that the record accurately reflects my personal performance of the history, physical exam, assessment and plan. I have also personally directed, reviewed, and agree with the discharge instructions.

## 2019-12-11 NOTE — REVIEW OF SYSTEMS
[Negative] : Pulmonary Hypertension [Fatigue] : fatigue [Dyspnea] : dyspnea [Wheezing] : wheezing [As Noted in HPI] : as noted in HPI [Snoring] : snoring [Witnessed Apneas] : demonstrated ~M apnea [Nonrestorative Sleep] : nonrestorative sleep

## 2019-12-11 NOTE — HISTORY OF PRESENT ILLNESS
[FreeTextEntry1] : Mr. Riddle is a 64 year old male presenting to the office today for an initial visit. His chief complaint is ?BRIAN.\par -he reports that he is concerned that he has sleep apnea. he reports that he snores very loudly, and his wife states that he has noticed him stop breathing at night\par -he states that his energy level has been poor, and he often feels that his memory/concentration are poor. he rates his energy level an 8/10\par -he states that he had a history of childhood asthma\par -he notes that he often becomes SOB and wheezes \par -he states that he would be able to fall asleep while watching a boring TV show, or as a passenger in a car for over one hour \par -his neck size is 16"\par -he reports that he has been walking for exercise, 2 miles/day\par -he states that he often gets reflux/heartburn if he eats late at night. he occasionally has a lump in her throat that she needs to clear\par -his weight has been stable\par -he has not been using any medications currently\par -he denies any headaches, nausea, vomiting, fever, chills, sweats, chest pain, chest pressure, diarrhea, constipation, dysphagia, dizziness, leg swelling, leg pain, itchy eyes, itchy ears, or sour taste in the mouth.

## 2019-12-11 NOTE — ASSESSMENT
[FreeTextEntry1] : Mr. WALLACE is a 64 year old male originally from [place], nonsmoker, with a history of GERD, childhood asthma, cholangiocarcinoma s/p resection, low vitamin D, and anxiety who now comes to the office for an initial pulmonary evaluation.\par \par His shortness of breath is multifactorial due to:\par -poor mechanics of breathing \par -out of shape / overweight\par -pulmonary disease\par -cardiac disease \par \par problem 1: ?asthma\par -get MCT\par -add Ventolin 2 puffs Q6H, pre-exercise \par -Asthma is believed to be caused by inherited (genetic) and environmental factor, but its exact cause is unknown. Asthma may be triggered by allergens, lung infections, or irritants in the air. Asthma triggers are different for each person \par -Inhaler technique reviewed as well as oral hygiene techniques reviewed with patient. Avoidance of cold air, extremes of temperature, rescue inhaler should be used before exercise. Order of medication reviewed with patient. Recommended use of a cool mist humidifier in the bedroom\par \par problem 2: r/o ?OSAS\par *RISKS: elevated Mallampati class, snoring, fatigue\par -get Home sleep study, if HSS c/w BRIAN, consider Oral Appliance\par -get blood work to include : free and total testosterone, Iron studies, Ferritin level, & complete thyroid function testing. \par Sleep apnea is associated with adverse clinical consequences which an affect most organ systems. Cardiovascular disease risk includes arrhythmias, atrial fibrillation, hypertension, coronary artery disease, and stroke. Metabolic disorders include diabetes type 2, non-alcoholic fatty liver disease. Mood disorder especially depression; and cognitive decline especially in the elderly. Associations with chronic reflux/Kirkland’s esophagus some but not all inclusive. \par -Reasons include arousal consistent with hypopnea; respiratory events most prominent in REM sleep or supine position; therefore sleep staging and body position are important for accurate diagnosis and estimation of AHI.\par -Good sleep hygiene was encouraged including avoiding watching television an hour before bed, keeping caffeine at a low, avoiding reading, television, or anything, in bed, no drinking any liquids three hours before bedtime, and only getting into bed when tired and ready for sleep. \par \par problem 3: GERD\par -Things to avoid including overeating, spicy foods, tight clothing, eating within three hours of bed, this list is not all inclusive. \par -Rule of 2's: avoid eating too much, eating too late, eating too spicy, eating two hours before bed\par -For treatment of reflux, possible options discussed including diet control, H2 blockers, PPIs, as well as coating motility agents discussed as treatment options. Timing of meals and proximity of last meal to sleep were discussed. If symptoms persist, a formal gastrointestinal evaluation is needed. \par \par problem 4: abnormal chest CT \par -most likely post-inflammatory in etiology\par -CAT scans are the only radiological modality to identify abnormalities w/in the lungs with regards to nodules/masses/lymph nodes. Risks, benefits were reviewed in detail. The guidelines for abnormalities include follow up CT scans at various intervals which could range from 6 weeks to 1 year intervals. If there is a change for the worse then consideration for a biopsy will be considered if you are a candidate. Second opinion evaluation with a thoracic surgeon or an interventional radiologist could be offered. \par \par problem 5: cardiac disease\par -recommended to continue to follow up with Cardiologist \par \par problem 6: poor breathing mechanics\par -Proper breathing techniques were reviewed with an emphasis of exhalation. Patient instructed to breath in for 1 second and out for four seconds. Patient was encouraged to not talk while walking. \par \par problem 7: out of shape / overweight\par -Weight loss, exercise, and diet control were discussed and are highly encouraged. Treatment options were given such as, aqua therapy, and contacting a nutritionist. Recommended to use the elliptical, stationary bike, less use of treadmill. Mindful eating was explained to the patient Obesity is associated with worsening asthma, shortness of breath, and potential for cardiac disease, diabetes, and other underlying medical conditions\par \par problem 8: health maintenance \par *PCP: Edilson Santamaria\par -recommended yearly flu shot after October 15\par -recommended strep pneumonia vaccines: Prevnar-13 vaccine, followed by Pneumo vaccine 23 one year following\par -recommended early intervention for Upper Respiratory Infections (URIs)\par -recommended regular osteoporosis evaluations\par -recommended early dermatological evaluations\par -recommended after the age of 50 to the age of 70, colonoscopy every 5 years\par \par F/U in 6-8 weeks.\par He is encouraged to call with any changes, concerns, or questions

## 2019-12-11 NOTE — PHYSICAL EXAM
[General Appearance - Well Developed] : well developed [Normal Appearance] : normal appearance [General Appearance - Well Nourished] : well nourished [Well Groomed] : well groomed [Normal Conjunctiva] : the conjunctiva exhibited no abnormalities [General Appearance - In No Acute Distress] : no acute distress [No Deformities] : no deformities [Eyelids - No Xanthelasma] : the eyelids demonstrated no xanthelasmas [Normal Oropharynx] : normal oropharynx [Neck Appearance] : the appearance of the neck was normal [Thyroid Nodule] : there were no palpable thyroid nodules [Thyroid Diffuse Enlargement] : the thyroid was not enlarged [Jugular Venous Distention Increased] : there was no jugular-venous distention [Neck Cervical Mass (___cm)] : no neck mass was observed [Murmurs] : no murmurs present [Heart Rate And Rhythm] : heart rate and rhythm were normal [Heart Sounds] : normal S1 and S2 [Respiration, Rhythm And Depth] : normal respiratory rhythm and effort [Exaggerated Use Of Accessory Muscles For Inspiration] : no accessory muscle use [Auscultation Breath Sounds / Voice Sounds] : lungs were clear to auscultation bilaterally [Abdomen Mass (___ Cm)] : no abdominal mass palpated [Abdomen Soft] : soft [Abdomen Tenderness] : non-tender [Gait - Sufficient For Exercise Testing] : the gait was sufficient for exercise testing [Nail Clubbing] : no clubbing of the fingernails [Abnormal Walk] : normal gait [Petechial Hemorrhages (___cm)] : no petechial hemorrhages [Skin Color & Pigmentation] : normal skin color and pigmentation [Cyanosis, Localized] : no localized cyanosis [] : no rash [Deep Tendon Reflexes (DTR)] : deep tendon reflexes were 2+ and symmetric [Skin Turgor] : normal skin turgor [Sensation] : the sensory exam was normal to light touch and pinprick [Oriented To Time, Place, And Person] : oriented to person, place, and time [No Focal Deficits] : no focal deficits [Impaired Insight] : insight and judgment were intact [Affect] : the affect was normal [III] : III [FreeTextEntry1] : I:E ratio 1:3; clear

## 2019-12-11 NOTE — REASON FOR VISIT
[Initial Evaluation] : an initial evaluation [Spouse] : spouse [FreeTextEntry1] : BRIAN / poor sleep

## 2019-12-27 ENCOUNTER — APPOINTMENT (OUTPATIENT)
Dept: SLEEP CENTER | Facility: CLINIC | Age: 64
End: 2019-12-27
Payer: MEDICAID

## 2019-12-27 PROCEDURE — 95806 SLEEP STUDY UNATT&RESP EFFT: CPT | Mod: 26

## 2019-12-28 ENCOUNTER — OUTPATIENT (OUTPATIENT)
Dept: OUTPATIENT SERVICES | Facility: HOSPITAL | Age: 64
LOS: 1 days | End: 2019-12-28
Payer: MEDICAID

## 2019-12-28 PROCEDURE — 95806 SLEEP STUDY UNATT&RESP EFFT: CPT

## 2020-01-06 DIAGNOSIS — G47.33 OBSTRUCTIVE SLEEP APNEA (ADULT) (PEDIATRIC): ICD-10-CM

## 2020-01-30 ENCOUNTER — APPOINTMENT (OUTPATIENT)
Dept: PULMONOLOGY | Facility: CLINIC | Age: 65
End: 2020-01-30

## 2020-02-10 ENCOUNTER — OUTPATIENT (OUTPATIENT)
Dept: OUTPATIENT SERVICES | Facility: HOSPITAL | Age: 65
LOS: 1 days | Discharge: HOME | End: 2020-02-10

## 2020-02-10 DIAGNOSIS — C22.1 INTRAHEPATIC BILE DUCT CARCINOMA: ICD-10-CM

## 2020-02-11 ENCOUNTER — RESULT REVIEW (OUTPATIENT)
Age: 65
End: 2020-02-11

## 2020-02-11 ENCOUNTER — APPOINTMENT (OUTPATIENT)
Dept: HEMATOLOGY ONCOLOGY | Facility: CLINIC | Age: 65
End: 2020-02-11
Payer: MEDICAID

## 2020-02-11 VITALS
SYSTOLIC BLOOD PRESSURE: 160 MMHG | TEMPERATURE: 98.4 F | BODY MASS INDEX: 30.08 KG/M2 | HEART RATE: 78 BPM | OXYGEN SATURATION: 99 % | DIASTOLIC BLOOD PRESSURE: 89 MMHG | WEIGHT: 175.26 LBS | RESPIRATION RATE: 18 BRPM

## 2020-02-11 LAB
BASOPHILS # BLD AUTO: 0.1 K/UL — SIGNIFICANT CHANGE UP (ref 0–0.2)
BASOPHILS NFR BLD AUTO: 0.9 % — SIGNIFICANT CHANGE UP (ref 0–2)
EOSINOPHIL # BLD AUTO: 0.4 K/UL — SIGNIFICANT CHANGE UP (ref 0–0.5)
EOSINOPHIL NFR BLD AUTO: 5.8 % — SIGNIFICANT CHANGE UP (ref 0–6)
HCT VFR BLD CALC: 41.4 % — SIGNIFICANT CHANGE UP (ref 39–50)
HGB BLD-MCNC: 13.9 G/DL — SIGNIFICANT CHANGE UP (ref 13–17)
LYMPHOCYTES # BLD AUTO: 1.7 K/UL — SIGNIFICANT CHANGE UP (ref 1–3.3)
LYMPHOCYTES # BLD AUTO: 23.9 % — SIGNIFICANT CHANGE UP (ref 13–44)
MCHC RBC-ENTMCNC: 29.6 PG — SIGNIFICANT CHANGE UP (ref 27–34)
MCHC RBC-ENTMCNC: 33.5 G/DL — SIGNIFICANT CHANGE UP (ref 32–36)
MCV RBC AUTO: 88.4 FL — SIGNIFICANT CHANGE UP (ref 80–100)
MONOCYTES # BLD AUTO: 0.7 K/UL — SIGNIFICANT CHANGE UP (ref 0–0.9)
MONOCYTES NFR BLD AUTO: 10.5 % — SIGNIFICANT CHANGE UP (ref 2–14)
NEUTROPHILS # BLD AUTO: 4.1 K/UL — SIGNIFICANT CHANGE UP (ref 1.8–7.4)
NEUTROPHILS NFR BLD AUTO: 58.9 % — SIGNIFICANT CHANGE UP (ref 43–77)
PLATELET # BLD AUTO: 174 K/UL — SIGNIFICANT CHANGE UP (ref 150–400)
RBC # BLD: 4.69 M/UL — SIGNIFICANT CHANGE UP (ref 4.2–5.8)
RBC # FLD: 12.8 % — SIGNIFICANT CHANGE UP (ref 10.3–14.5)
WBC # BLD: 7 K/UL — SIGNIFICANT CHANGE UP (ref 3.8–10.5)
WBC # FLD AUTO: 7 K/UL — SIGNIFICANT CHANGE UP (ref 3.8–10.5)

## 2020-02-11 PROCEDURE — 99213 OFFICE O/P EST LOW 20 MIN: CPT

## 2020-02-12 NOTE — ASSESSMENT
[Curative] : Goals of care discussed with patient: Curative [Palliative Care Plan] : not applicable at this time [FreeTextEntry1] : Will  surveillance for recurrent cancer.  Order repeat scans of the chest abdomen/pelvis chest, Ordered labs f/u. RTO in 6 months.

## 2020-02-12 NOTE — HISTORY OF PRESENT ILLNESS
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 His history dates back 6 weeks ago when he noted the onset of severe itching. He was seen in the emergency room on December 23 with jaundice and CAT scan revealed marked intrahepatic biliary ductal dilatation to the level of the biliary confluence. The CAT scan of the chest revealed a 6 mm linear opacity in the right upper lobe. MRI of the abdomen confirmed a biliary ductal dilatation to the level of the biliary hilum with an area of delayed enhancement measuring 3 x 1.9 cm. Thrombosis of the left portal vein was seen. 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 The patient underwent stent placement which improved his itching and jaundice. Cytology was obtained which was consistent with carcinoma.\par \par On January 5 the patient underwent partial hepatectomy including the left lobe and  gallbladder, removal of the extrahepatic bile duct, hepaticojejunostomy to the Ju-en-Y, reconstruction of the portal vein with saphenous vein patch. The pathology revealed moderate to poorly differentiated cholangiocarcinoma involving the perihilar bile ducts. This tumor extended to the wall of the portal vein. The hepatic margin of resection was negative. 2 lymph nodes were negative. The bladder was nonmalignant. The tumor extended into the adjacent hepatic parenchyma, into the surrounding adipose tissue and extended into the interlobular bile ducts with extensive perineural invasion. The bile duct margins were negative. The tumor measured 2.9 x 2.4 cm. Staging was T3 N0.\par \par Patient did well postoperatively. Overall he lost about 20 pounds but now his appetite is improving and he feels stronger. His wound is healing well and he denies nausea, vomiting or diarrhea. There is no previous history of prior liver disease, gallbladder disease or GI disease. There is no family yesterday of malignancy. The patient works as a [employer] . [de-identified] : Patient is here for 6 months f/u. He feels okay except for occasional discomfort along incision scar. Appetite is good. Denies any nausea, vomiting, diarrhea or constipation. BM's are okay. Denies any bloody or melena stools.

## 2020-02-12 NOTE — PHYSICAL EXAM
[Restricted in physically strenuous activity but ambulatory and able to carry out work of a light or sedentary nature] : Status 1- Restricted in physically strenuous activity but ambulatory and able to carry out work of a light or sedentary nature, e.g., light house work, office work [Normal] : affect appropriate [de-identified] : Normal bowel sounds, nontender to palpation, + lower well healed incisional scar, no massed

## 2020-02-19 ENCOUNTER — FORM ENCOUNTER (OUTPATIENT)
Age: 65
End: 2020-02-19

## 2020-02-20 ENCOUNTER — OUTPATIENT (OUTPATIENT)
Dept: OUTPATIENT SERVICES | Facility: HOSPITAL | Age: 65
LOS: 1 days | End: 2020-02-20
Payer: MEDICAID

## 2020-02-20 ENCOUNTER — APPOINTMENT (OUTPATIENT)
Dept: CT IMAGING | Facility: IMAGING CENTER | Age: 65
End: 2020-02-20

## 2020-02-20 DIAGNOSIS — C22.1 INTRAHEPATIC BILE DUCT CARCINOMA: ICD-10-CM

## 2020-02-20 PROCEDURE — 74177 CT ABD & PELVIS W/CONTRAST: CPT | Mod: 26

## 2020-02-20 PROCEDURE — 74177 CT ABD & PELVIS W/CONTRAST: CPT

## 2020-02-21 LAB
ALBUMIN SERPL ELPH-MCNC: 4 G/DL
ALP BLD-CCNC: 110 U/L
ALT SERPL-CCNC: 16 U/L
ANION GAP SERPL CALC-SCNC: 14 MMOL/L
AST SERPL-CCNC: 16 U/L
BILIRUB SERPL-MCNC: 0.4 MG/DL
BUN SERPL-MCNC: 14 MG/DL
CALCIUM SERPL-MCNC: 9 MG/DL
CANCER AG19-9 SERPL-ACNC: 126 U/ML
CEA SERPL-MCNC: 3.9 NG/ML
CHLORIDE SERPL-SCNC: 101 MMOL/L
CO2 SERPL-SCNC: 27 MMOL/L
CREAT SERPL-MCNC: 0.79 MG/DL
GLUCOSE SERPL-MCNC: 164 MG/DL
POTASSIUM SERPL-SCNC: 3.3 MMOL/L
PROT SERPL-MCNC: 7.3 G/DL
SODIUM SERPL-SCNC: 141 MMOL/L

## 2020-02-24 ENCOUNTER — TRANSCRIPTION ENCOUNTER (OUTPATIENT)
Age: 65
End: 2020-02-24

## 2020-02-24 ENCOUNTER — FORM ENCOUNTER (OUTPATIENT)
Age: 65
End: 2020-02-24

## 2020-02-25 ENCOUNTER — OUTPATIENT (OUTPATIENT)
Dept: OUTPATIENT SERVICES | Facility: HOSPITAL | Age: 65
LOS: 1 days | End: 2020-02-25
Payer: MEDICAID

## 2020-02-25 ENCOUNTER — APPOINTMENT (OUTPATIENT)
Dept: CT IMAGING | Facility: IMAGING CENTER | Age: 65
End: 2020-02-25
Payer: MEDICAID

## 2020-02-25 DIAGNOSIS — C22.1 INTRAHEPATIC BILE DUCT CARCINOMA: ICD-10-CM

## 2020-02-25 PROCEDURE — 71260 CT THORAX DX C+: CPT

## 2020-02-25 PROCEDURE — 71260 CT THORAX DX C+: CPT | Mod: 26

## 2020-02-27 ENCOUNTER — RESULT REVIEW (OUTPATIENT)
Age: 65
End: 2020-02-27

## 2020-02-27 ENCOUNTER — APPOINTMENT (OUTPATIENT)
Dept: HEMATOLOGY ONCOLOGY | Facility: CLINIC | Age: 65
End: 2020-02-27
Payer: MEDICAID

## 2020-02-27 ENCOUNTER — APPOINTMENT (OUTPATIENT)
Dept: HEMATOLOGY ONCOLOGY | Facility: CLINIC | Age: 65
End: 2020-02-27

## 2020-02-27 VITALS
WEIGHT: 174.14 LBS | RESPIRATION RATE: 16 BRPM | OXYGEN SATURATION: 100 % | DIASTOLIC BLOOD PRESSURE: 81 MMHG | HEART RATE: 82 BPM | BODY MASS INDEX: 29.89 KG/M2 | TEMPERATURE: 98.3 F | SYSTOLIC BLOOD PRESSURE: 155 MMHG

## 2020-02-27 LAB
BASOPHILS # BLD AUTO: 0 K/UL — SIGNIFICANT CHANGE UP (ref 0–0.2)
BASOPHILS NFR BLD AUTO: 0.8 % — SIGNIFICANT CHANGE UP (ref 0–2)
EOSINOPHIL # BLD AUTO: 0.4 K/UL — SIGNIFICANT CHANGE UP (ref 0–0.5)
EOSINOPHIL NFR BLD AUTO: 6 % — SIGNIFICANT CHANGE UP (ref 0–6)
HCT VFR BLD CALC: 39.9 % — SIGNIFICANT CHANGE UP (ref 39–50)
HGB BLD-MCNC: 13.5 G/DL — SIGNIFICANT CHANGE UP (ref 13–17)
LYMPHOCYTES # BLD AUTO: 1.4 K/UL — SIGNIFICANT CHANGE UP (ref 1–3.3)
LYMPHOCYTES # BLD AUTO: 23.1 % — SIGNIFICANT CHANGE UP (ref 13–44)
MCHC RBC-ENTMCNC: 30.1 PG — SIGNIFICANT CHANGE UP (ref 27–34)
MCHC RBC-ENTMCNC: 33.8 G/DL — SIGNIFICANT CHANGE UP (ref 32–36)
MCV RBC AUTO: 89.1 FL — SIGNIFICANT CHANGE UP (ref 80–100)
MONOCYTES # BLD AUTO: 0.6 K/UL — SIGNIFICANT CHANGE UP (ref 0–0.9)
MONOCYTES NFR BLD AUTO: 9.7 % — SIGNIFICANT CHANGE UP (ref 2–14)
NEUTROPHILS # BLD AUTO: 3.6 K/UL — SIGNIFICANT CHANGE UP (ref 1.8–7.4)
NEUTROPHILS NFR BLD AUTO: 60.4 % — SIGNIFICANT CHANGE UP (ref 43–77)
PLATELET # BLD AUTO: 173 K/UL — SIGNIFICANT CHANGE UP (ref 150–400)
RBC # BLD: 4.48 M/UL — SIGNIFICANT CHANGE UP (ref 4.2–5.8)
RBC # FLD: 12.9 % — SIGNIFICANT CHANGE UP (ref 10.3–14.5)
WBC # BLD: 5.9 K/UL — SIGNIFICANT CHANGE UP (ref 3.8–10.5)
WBC # FLD AUTO: 5.9 K/UL — SIGNIFICANT CHANGE UP (ref 3.8–10.5)

## 2020-02-27 PROCEDURE — 99214 OFFICE O/P EST MOD 30 MIN: CPT

## 2020-03-03 LAB
ALBUMIN SERPL ELPH-MCNC: 4.2 G/DL
ALP BLD-CCNC: 113 U/L
ALT SERPL-CCNC: 13 U/L
ANION GAP SERPL CALC-SCNC: 15 MMOL/L
APTT BLD: 34.7 SEC
AST SERPL-CCNC: 18 U/L
BILIRUB SERPL-MCNC: 0.8 MG/DL
BUN SERPL-MCNC: 8 MG/DL
CALCIUM SERPL-MCNC: 8.9 MG/DL
CANCER AG19-9 SERPL-ACNC: 133 U/ML
CEA SERPL-MCNC: 3.9 NG/ML
CGA SERPL-MCNC: 59 NG/ML
CHLORIDE SERPL-SCNC: 101 MMOL/L
CO2 SERPL-SCNC: 26 MMOL/L
CREAT SERPL-MCNC: 0.66 MG/DL
GLUCOSE SERPL-MCNC: 85 MG/DL
INR PPP: 1.12 RATIO
POTASSIUM SERPL-SCNC: 3.6 MMOL/L
PROT SERPL-MCNC: 7.3 G/DL
PT BLD: 12.8 SEC
SODIUM SERPL-SCNC: 142 MMOL/L

## 2020-03-04 DIAGNOSIS — R93.5 ABNORMAL FINDINGS ON DIAGNOSTIC IMAGING OF OTHER ABDOMINAL REGIONS, INCLUDING RETROPERITONEUM: ICD-10-CM

## 2020-03-11 NOTE — HISTORY OF PRESENT ILLNESS
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 His history dates back 6 weeks ago, December 2015, when he noted the onset of severe itching. He was seen in the emergency room on December 23 with jaundice and CAT scan revealed marked intrahepatic biliary ductal dilatation to the level of the biliary confluence. The CAT scan of the chest revealed a 6 mm linear opacity in the right upper lobe. MRI of the abdomen confirmed a biliary ductal dilatation to the level of the biliary hilum with an area of delayed enhancement measuring 3 x 1.9 cm. Thrombosis of the left portal vein was seen. 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 The patient underwent stent placement which improved his itching and jaundice. Cytology was obtained which was consistent with carcinoma.\par \par On January 5, 2016,  the patient underwent partial hepatectomy including the left lobe and  gallbladder, removal of the extrahepatic bile duct, hepaticojejunostomy to the Ju-en-Y, reconstruction of the portal vein with saphenous vein patch. The pathology revealed moderate to poorly differentiated cholangiocarcinoma involving the perihilar bile ducts. This tumor extended to the wall of the portal vein. The hepatic margin of resection was negative. 2 lymph nodes were negative. The bladder was nonmalignant. The tumor extended into the adjacent hepatic parenchyma, into the surrounding adipose tissue and extended into the interlobular bile ducts with extensive perineural invasion. The bile duct margins were negative. The tumor measured 2.9 x 2.4 cm. Staging was T3 N0.\par \par Patient did well postoperatively. Overall he lost about 20 pounds but now his appetite is improving and he feels stronger. His wound is healing well and he denies nausea, vomiting or diarrhea. There is no previous history of prior liver disease, gallbladder disease or GI disease. There is no family yesterday of malignancy. The patient works as a [employer] . [de-identified] : poorly diff cholangioca [de-identified] : Since the last visit the pt had repeat Ct scan and showed 4 cm brian mass encassing the vessels. and pt returns to discuss options for evaluation.

## 2020-03-11 NOTE — REVIEW OF SYSTEMS
[Negative] : Allergic/Immunologic [FreeTextEntry7] : has abdominal bloating fter eating any food. constipation resolved with dulcolax [FreeTextEntry9] : has low back

## 2020-03-11 NOTE — ASSESSMENT
[Palliative] : Goals of care discussed with patient: Palliative [Palliative Care Plan] : not applicable at this time [FreeTextEntry1] : Pt has recurrent brian mass in mid abdominal area encassing blood vessels. Pt to be evaluated for biopsy and Molecular testing. Pt may need repeat chemo and RT. Will review for surgical options also.Pt to RTO in 2 weeks or sooner if needed.

## 2020-03-18 ENCOUNTER — OUTPATIENT (OUTPATIENT)
Dept: OUTPATIENT SERVICES | Facility: HOSPITAL | Age: 65
LOS: 1 days | End: 2020-03-18
Payer: MEDICAID

## 2020-03-18 VITALS
WEIGHT: 173.06 LBS | OXYGEN SATURATION: 99 % | TEMPERATURE: 99 F | HEART RATE: 88 BPM | HEIGHT: 64 IN | RESPIRATION RATE: 17 BRPM | SYSTOLIC BLOOD PRESSURE: 139 MMHG | DIASTOLIC BLOOD PRESSURE: 83 MMHG

## 2020-03-18 DIAGNOSIS — R68.89 OTHER GENERAL SYMPTOMS AND SIGNS: ICD-10-CM

## 2020-03-18 DIAGNOSIS — Z98.890 OTHER SPECIFIED POSTPROCEDURAL STATES: Chronic | ICD-10-CM

## 2020-03-18 DIAGNOSIS — G47.33 OBSTRUCTIVE SLEEP APNEA (ADULT) (PEDIATRIC): ICD-10-CM

## 2020-03-18 DIAGNOSIS — I10 ESSENTIAL (PRIMARY) HYPERTENSION: ICD-10-CM

## 2020-03-18 DIAGNOSIS — R93.5 ABNORMAL FINDINGS ON DIAGNOSTIC IMAGING OF OTHER ABDOMINAL REGIONS, INCLUDING RETROPERITONEUM: ICD-10-CM

## 2020-03-18 DIAGNOSIS — Z01.818 ENCOUNTER FOR OTHER PREPROCEDURAL EXAMINATION: ICD-10-CM

## 2020-03-18 PROCEDURE — G0463: CPT

## 2020-03-18 NOTE — H&P PST ADULT - HISTORY OF PRESENT ILLNESS
64 year old male with h/o cholangiocarcinoma involving the left portal vein and biliary tree (2016) s/p Ju-En-Y with creation of the enteroenterostomy 1/6/2016, presents to preadmission testing for upper EUS FNA on3/24/2020 for abnormal diagnostic imaging abdominal region finding. Patient denies any nausea, no vomiting, no abdominal discomfort. 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 64 year old male with h/o cholangiocarcinoma involving the left portal vein and biliary tree (2016) s/p Ju-En-Y with creation of the enteroenterostomy 1/6/2016, presents to preadmission testing for upper EUS FNA on3/24/2020 for abnormal diagnostic imaging abdominal region finding. Patient denies any nausea, no vomiting, no abdominal discomfort. 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     ***Patient with one upper right loose tooth, anesthesia notified and message left for surgeon dina Du at the office. Patient was advised to see the dentist prior to procedure.

## 2020-03-18 NOTE — H&P PST ADULT - NSICDXFAMILYHX_GEN_ALL_CORE_FT
FAMILY HISTORY:  Family history of hypothyroidism, mother  age 75    Father  Still living? Unknown  Family history of diabetes mellitus, Age at diagnosis: Age Unknown

## 2020-03-18 NOTE — H&P PST ADULT - REASON FOR ADMISSION
"He is having a biopsy." "He is having a biopsy, a spot was found on imaging, not sure if it's cancerous."

## 2020-03-18 NOTE — H&P PST ADULT - HEALTH CARE MAINTENANCE
pneumonia vaccine up to date   bile duct resection 1/5/2016 2015 diagnosed   cholecystectomy, liver resection pneumonia vaccine up to date   patient not sure if received influenza vaccine       cholecystectomy, liver resection

## 2020-03-18 NOTE — H&P PST ADULT - NSICDXPASTMEDICALHX_GEN_ALL_CORE_FT
PAST MEDICAL HISTORY:  HTN (hypertension)     Hypercholesterolemia     Liver mass     BRIAN (obstructive sleep apnea)     TB (tuberculosis) age 17 PAST MEDICAL HISTORY:  Cholangiocarcinoma diagnosed 2015    HTN (hypertension)     Hypercholesterolemia     Liver mass     BRIAN (obstructive sleep apnea)     TB (tuberculosis) age 17

## 2020-03-18 NOTE — H&P PST ADULT - NSICDXPROBLEM_GEN_ALL_CORE_FT
PROBLEM DIAGNOSES  Problem: Other nonspecific abnormal findings  Assessment and Plan: scheduled for upper EUS FNA  preprocedure instruction discussed with patient and his daughter, both verbalized understanding     Problem: BRIAN (obstructive sleep apnea)  Assessment and Plan: endoscopy booking notified     Problem: Hypertension  Assessment and Plan: instructed to continue antihypertensive medication cali-op

## 2020-03-31 ENCOUNTER — OUTPATIENT (OUTPATIENT)
Dept: OUTPATIENT SERVICES | Facility: HOSPITAL | Age: 65
LOS: 1 days | End: 2020-03-31
Payer: MEDICAID

## 2020-03-31 ENCOUNTER — APPOINTMENT (OUTPATIENT)
Dept: GASTROENTEROLOGY | Facility: HOSPITAL | Age: 65
End: 2020-03-31

## 2020-03-31 DIAGNOSIS — R93.5 ABNORMAL FINDINGS ON DIAGNOSTIC IMAGING OF OTHER ABDOMINAL REGIONS, INCLUDING RETROPERITONEUM: ICD-10-CM

## 2020-03-31 DIAGNOSIS — Z98.890 OTHER SPECIFIED POSTPROCEDURAL STATES: Chronic | ICD-10-CM

## 2020-03-31 PROCEDURE — 88307 TISSUE EXAM BY PATHOLOGIST: CPT

## 2020-03-31 PROCEDURE — 88307 TISSUE EXAM BY PATHOLOGIST: CPT | Mod: 26

## 2020-03-31 PROCEDURE — 43242 EGD US FINE NEEDLE BX/ASPIR: CPT

## 2020-03-31 PROCEDURE — 43242 EGD US FINE NEEDLE BX/ASPIR: CPT | Mod: GC

## 2020-04-20 PROBLEM — C22.1 INTRAHEPATIC BILE DUCT CARCINOMA: Chronic | Status: ACTIVE | Noted: 2020-03-18

## 2020-04-20 PROBLEM — G47.33 OBSTRUCTIVE SLEEP APNEA (ADULT) (PEDIATRIC): Chronic | Status: ACTIVE | Noted: 2020-03-18

## 2020-04-22 ENCOUNTER — OUTPATIENT (OUTPATIENT)
Dept: OUTPATIENT SERVICES | Facility: HOSPITAL | Age: 65
LOS: 1 days | End: 2020-04-22
Payer: MEDICAID

## 2020-04-22 ENCOUNTER — APPOINTMENT (OUTPATIENT)
Dept: CT IMAGING | Facility: IMAGING CENTER | Age: 65
End: 2020-04-22
Payer: MEDICAID

## 2020-04-22 DIAGNOSIS — Z98.890 OTHER SPECIFIED POSTPROCEDURAL STATES: Chronic | ICD-10-CM

## 2020-04-22 DIAGNOSIS — R19.00 INTRA-ABDOMINAL AND PELVIC SWELLING, MASS AND LUMP, UNSPECIFIED SITE: ICD-10-CM

## 2020-04-22 DIAGNOSIS — C22.1 INTRAHEPATIC BILE DUCT CARCINOMA: ICD-10-CM

## 2020-04-22 DIAGNOSIS — R93.5 ABNORMAL FINDINGS ON DIAGNOSTIC IMAGING OF OTHER ABDOMINAL REGIONS, INCLUDING RETROPERITONEUM: ICD-10-CM

## 2020-04-22 DIAGNOSIS — K21.9 GASTRO-ESOPHAGEAL REFLUX DISEASE WITHOUT ESOPHAGITIS: ICD-10-CM

## 2020-04-22 PROCEDURE — 82565 ASSAY OF CREATININE: CPT

## 2020-04-22 PROCEDURE — 74177 CT ABD & PELVIS W/CONTRAST: CPT | Mod: 26

## 2020-04-22 PROCEDURE — 74177 CT ABD & PELVIS W/CONTRAST: CPT

## 2020-04-29 ENCOUNTER — APPOINTMENT (OUTPATIENT)
Dept: INTERVENTIONAL RADIOLOGY/VASCULAR | Facility: CLINIC | Age: 65
End: 2020-04-29
Payer: MEDICAID

## 2020-04-29 VITALS — HEIGHT: 64 IN | WEIGHT: 165 LBS | BODY MASS INDEX: 28.17 KG/M2

## 2020-04-29 DIAGNOSIS — R19.00 INTRA-ABDOMINAL AND PELVIC SWELLING, MASS AND LUMP, UNSPECIFIED SITE: ICD-10-CM

## 2020-04-29 PROCEDURE — 99204 OFFICE O/P NEW MOD 45 MIN: CPT | Mod: 95

## 2020-04-29 RX ORDER — MULTIVITAMIN
CAPSULE ORAL
Refills: 0 | Status: COMPLETED | COMMUNITY
Start: 2017-04-05 | End: 2020-04-29

## 2020-04-29 RX ORDER — OMEPRAZOLE 40 MG/1
40 CAPSULE, DELAYED RELEASE ORAL
Qty: 60 | Refills: 3 | Status: COMPLETED | COMMUNITY
Start: 2017-07-19 | End: 2020-04-29

## 2020-05-18 ENCOUNTER — APPOINTMENT (OUTPATIENT)
Dept: DISASTER EMERGENCY | Facility: CLINIC | Age: 65
End: 2020-05-18

## 2020-05-18 DIAGNOSIS — Z01.818 ENCOUNTER FOR OTHER PREPROCEDURAL EXAMINATION: ICD-10-CM

## 2020-05-19 ENCOUNTER — RESULT REVIEW (OUTPATIENT)
Age: 65
End: 2020-05-19

## 2020-05-19 ENCOUNTER — OUTPATIENT (OUTPATIENT)
Dept: OUTPATIENT SERVICES | Facility: HOSPITAL | Age: 65
LOS: 1 days | End: 2020-05-19
Payer: MEDICAID

## 2020-05-19 DIAGNOSIS — C22.1 INTRAHEPATIC BILE DUCT CARCINOMA: ICD-10-CM

## 2020-05-19 DIAGNOSIS — Z98.890 OTHER SPECIFIED POSTPROCEDURAL STATES: Chronic | ICD-10-CM

## 2020-05-19 LAB — SARS-COV-2 N GENE NPH QL NAA+PROBE: NOT DETECTED

## 2020-05-19 PROCEDURE — 77012 CT SCAN FOR NEEDLE BIOPSY: CPT | Mod: 26

## 2020-05-19 PROCEDURE — 49180 BIOPSY ABDOMINAL MASS: CPT

## 2020-05-19 PROCEDURE — 88173 CYTOPATH EVAL FNA REPORT: CPT | Mod: 26

## 2020-05-19 PROCEDURE — 88312 SPECIAL STAINS GROUP 1: CPT | Mod: 26

## 2020-05-19 PROCEDURE — 88305 TISSUE EXAM BY PATHOLOGIST: CPT | Mod: 26

## 2020-05-22 DIAGNOSIS — R19.09 OTHER INTRA-ABDOMINAL AND PELVIC SWELLING, MASS AND LUMP: ICD-10-CM

## 2020-05-22 LAB
NON-GYNECOLOGICAL CYTOLOGY STUDY: SIGNIFICANT CHANGE UP
SARS-COV-2 RNA SPEC QL NAA+PROBE: SIGNIFICANT CHANGE UP

## 2020-06-02 ENCOUNTER — APPOINTMENT (OUTPATIENT)
Dept: RADIATION ONCOLOGY | Facility: CLINIC | Age: 65
End: 2020-06-02
Payer: MEDICAID

## 2020-06-02 DIAGNOSIS — Z92.21 PERSONAL HISTORY OF ANTINEOPLASTIC CHEMOTHERAPY: ICD-10-CM

## 2020-06-02 PROCEDURE — 99204 OFFICE O/P NEW MOD 45 MIN: CPT | Mod: 25,95

## 2020-06-02 NOTE — REVIEW OF SYSTEMS
[Abdominal Pain] : abdominal pain [Constipation] : constipation [Difficulty Walking] : difficulty walking [Negative] : Heme/Lymph [Constipation: Grade 1 - Occasional or intermittent symptoms; occasional use of stool softeners, laxatives, dietary modification, or enema] : Constipation: Grade 1 - Occasional or intermittent symptoms; occasional use of stool softeners, laxatives, dietary modification, or enema [Diarrhea: Grade 0] : Diarrhea: Grade 0 [Dysphagia: Grade 0] : Dysphagia: Grade 0 [Nausea: Grade 0] : Nausea: Grade 0 [Vomiting: Grade 0] : Vomiting: Grade 0 [Fatigue: Grade 1 - Fatigue relieved by rest] : Fatigue: Grade 1 - Fatigue relieved by rest [Cough: Grade 0] : Cough: Grade 0 [Dyspnea: Grade 0] : Dyspnea: Grade 0

## 2020-06-02 NOTE — END OF VISIT
Patient is experiencing difficulty swallowing that has been increasing in severity over last couple of days to the point where patient did not feel as if she could swallow.  As of 01/26, SLP evaluated patient and determined that she should be NPO except for medications until a modified barium swallow could be performed. Possibly candidal esophagitis; patient has oral thrush.    Plan:  - High aspiration risk on modified barium study, strict NPO  - NGT in situ, tube feeds initiated per dietary consult  - Switched to IV meds where possible  - Fluconazole IVPB 200mg q24hr (now d/c'd)  - GI consulted, EGD resulted w/ Grade C erosive esophagitis, no candidal infxn identified. Bx obtained, GI recs start PPI bid  - Failed repeat swallow trial for NGT removal (2/4) SLP plans swallowing exercises, continue current steroid and immunosuppressive therapies, will repeat study on Wednesday.   - Currently famotidine 20 daily per NGT, when PO intake resumed switch to PPI PO bid per GI recs for esophagitis/ulcerations.   [Time Spent: ___ minutes] : I have spent [unfilled] minutes of time on the encounter.

## 2020-06-02 NOTE — VITALS
[Maximal Pain Intensity: 8/10] : 8/10 [Least Pain Intensity: 0/10] : 0/10 [Pain Description/Quality: ___] : Pain description/quality: [unfilled] [Pain Duration: ___] : Pain duration: [unfilled] [Pain Location: ___] : Pain Location: [unfilled] [Pain Interferes with ADLs] : Pain interferes with activities of daily living. [NoTreatment Scheduled] : no treatment scheduled [70: Cares for self; unalbe to carry on normal activity or do active work.] : 70: Cares for self; unable to carry on normal activity or do active work. [ECOG Performance Status: 1 - Restricted in physically strenuous activity but ambulatory and able to carry out work of a light or sedentary nature] : Performance Status: 1 - Restricted in physically strenuous activity but ambulatory and able to carry out work of a light or sedentary nature, e.g., light house work, office work

## 2020-06-06 ENCOUNTER — TRANSCRIPTION ENCOUNTER (OUTPATIENT)
Age: 65
End: 2020-06-06

## 2020-06-11 ENCOUNTER — APPOINTMENT (OUTPATIENT)
Dept: NUCLEAR MEDICINE | Facility: IMAGING CENTER | Age: 65
End: 2020-06-11
Payer: MEDICAID

## 2020-06-11 ENCOUNTER — OUTPATIENT (OUTPATIENT)
Dept: OUTPATIENT SERVICES | Facility: HOSPITAL | Age: 65
LOS: 1 days | End: 2020-06-11
Payer: MEDICAID

## 2020-06-11 DIAGNOSIS — C22.1 INTRAHEPATIC BILE DUCT CARCINOMA: ICD-10-CM

## 2020-06-11 DIAGNOSIS — Z98.890 OTHER SPECIFIED POSTPROCEDURAL STATES: Chronic | ICD-10-CM

## 2020-06-11 PROCEDURE — A9552: CPT

## 2020-06-11 PROCEDURE — 78815 PET IMAGE W/CT SKULL-THIGH: CPT | Mod: 26,PS

## 2020-06-11 PROCEDURE — 78815 PET IMAGE W/CT SKULL-THIGH: CPT

## 2020-07-24 ENCOUNTER — OUTPATIENT (OUTPATIENT)
Dept: OUTPATIENT SERVICES | Facility: HOSPITAL | Age: 65
LOS: 1 days | Discharge: HOME | End: 2020-07-24

## 2020-07-24 DIAGNOSIS — C22.1 INTRAHEPATIC BILE DUCT CARCINOMA: ICD-10-CM

## 2020-07-24 DIAGNOSIS — Z98.890 OTHER SPECIFIED POSTPROCEDURAL STATES: Chronic | ICD-10-CM

## 2020-07-29 ENCOUNTER — APPOINTMENT (OUTPATIENT)
Dept: HEMATOLOGY ONCOLOGY | Facility: CLINIC | Age: 65
End: 2020-07-29

## 2020-08-05 ENCOUNTER — RESULT REVIEW (OUTPATIENT)
Age: 65
End: 2020-08-05

## 2020-08-05 ENCOUNTER — APPOINTMENT (OUTPATIENT)
Dept: HEMATOLOGY ONCOLOGY | Facility: CLINIC | Age: 65
End: 2020-08-05
Payer: MEDICAID

## 2020-08-05 ENCOUNTER — APPOINTMENT (OUTPATIENT)
Dept: HEMATOLOGY ONCOLOGY | Facility: CLINIC | Age: 65
End: 2020-08-05

## 2020-08-05 VITALS
OXYGEN SATURATION: 97 % | SYSTOLIC BLOOD PRESSURE: 137 MMHG | BODY MASS INDEX: 26.09 KG/M2 | DIASTOLIC BLOOD PRESSURE: 74 MMHG | WEIGHT: 151.99 LBS | TEMPERATURE: 98.5 F | HEART RATE: 92 BPM | RESPIRATION RATE: 16 BRPM

## 2020-08-05 DIAGNOSIS — E87.6 HYPOKALEMIA: ICD-10-CM

## 2020-08-05 LAB
BASOPHILS # BLD AUTO: 0.05 K/UL — SIGNIFICANT CHANGE UP (ref 0–0.2)
BASOPHILS NFR BLD AUTO: 0.9 % — SIGNIFICANT CHANGE UP (ref 0–2)
EOSINOPHIL # BLD AUTO: 0.23 K/UL — SIGNIFICANT CHANGE UP (ref 0–0.5)
EOSINOPHIL NFR BLD AUTO: 4.2 % — SIGNIFICANT CHANGE UP (ref 0–6)
HCT VFR BLD CALC: 32.4 % — LOW (ref 39–50)
HGB BLD-MCNC: 11 G/DL — LOW (ref 13–17)
IMM GRANULOCYTES NFR BLD AUTO: 0.6 % — SIGNIFICANT CHANGE UP (ref 0–1.5)
LYMPHOCYTES # BLD AUTO: 1.28 K/UL — SIGNIFICANT CHANGE UP (ref 1–3.3)
LYMPHOCYTES # BLD AUTO: 23.6 % — SIGNIFICANT CHANGE UP (ref 13–44)
MCHC RBC-ENTMCNC: 29.7 PG — SIGNIFICANT CHANGE UP (ref 27–34)
MCHC RBC-ENTMCNC: 34 GM/DL — SIGNIFICANT CHANGE UP (ref 32–36)
MCV RBC AUTO: 87.6 FL — SIGNIFICANT CHANGE UP (ref 80–100)
MONOCYTES # BLD AUTO: 0.6 K/UL — SIGNIFICANT CHANGE UP (ref 0–0.9)
MONOCYTES NFR BLD AUTO: 11.1 % — SIGNIFICANT CHANGE UP (ref 2–14)
NEUTROPHILS # BLD AUTO: 3.23 K/UL — SIGNIFICANT CHANGE UP (ref 1.8–7.4)
NEUTROPHILS NFR BLD AUTO: 59.6 % — SIGNIFICANT CHANGE UP (ref 43–77)
NRBC # BLD: 0 /100 WBCS — SIGNIFICANT CHANGE UP (ref 0–0)
PLATELET # BLD AUTO: 141 K/UL — LOW (ref 150–400)
RBC # BLD: 3.7 M/UL — LOW (ref 4.2–5.8)
RBC # FLD: 15 % — HIGH (ref 10.3–14.5)
WBC # BLD: 5.42 K/UL — SIGNIFICANT CHANGE UP (ref 3.8–10.5)
WBC # FLD AUTO: 5.42 K/UL — SIGNIFICANT CHANGE UP (ref 3.8–10.5)

## 2020-08-05 PROCEDURE — 99214 OFFICE O/P EST MOD 30 MIN: CPT

## 2020-08-06 ENCOUNTER — APPOINTMENT (OUTPATIENT)
Dept: HEMATOLOGY ONCOLOGY | Facility: CLINIC | Age: 65
End: 2020-08-06
Payer: MEDICAID

## 2020-08-06 PROCEDURE — 99441: CPT

## 2020-08-07 NOTE — REVIEW OF SYSTEMS
[Fever] : fever [Constipation] : constipation [SOB on Exertion] : shortness of breath during exertion [Negative] : Allergic/Immunologic [FreeTextEntry6] : chronic SOB and had pulmonary tests [FreeTextEntry4] : has inc saliva with eating [FreeTextEntry8] : nocturia 3-4 x

## 2020-08-07 NOTE — HISTORY OF PRESENT ILLNESS
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 His history dates back 6 weeks ago, December 2015, when he noted the onset of severe itching. He was seen in the emergency room on December 23 with jaundice and CAT scan revealed marked intrahepatic biliary ductal dilatation to the level of the biliary confluence. The CAT scan of the chest revealed a 6 mm linear opacity in the right upper lobe. MRI of the abdomen confirmed a biliary ductal dilatation to the level of the biliary hilum with an area of delayed enhancement measuring 3 x 1.9 cm. Thrombosis of the left portal vein was seen. 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 The patient underwent stent placement which improved his itching and jaundice. Cytology was obtained which was consistent with carcinoma.\par \par On January 5, 2016,  the patient underwent partial hepatectomy including the left lobe and  gallbladder, removal of the extrahepatic bile duct, hepaticojejunostomy to the Ju-en-Y, reconstruction of the portal vein with saphenous vein patch. The pathology revealed moderate to poorly differentiated cholangiocarcinoma involving the perihilar bile ducts. This tumor extended to the wall of the portal vein. The hepatic margin of resection was negative. 2 lymph nodes were negative. The bladder was nonmalignant. The tumor extended into the adjacent hepatic parenchyma, into the surrounding adipose tissue and extended into the interlobular bile ducts with extensive perineural invasion. The bile duct margins were negative. The tumor measured 2.9 x 2.4 cm. Staging was T3 N0.\par \par Patient did well postoperatively. Overall he lost about 20 pounds but now his appetite is improving and he feels stronger. His wound is healing well and he denies nausea, vomiting or diarrhea. There is no previous history of prior liver disease, gallbladder disease or GI disease. There is no family yesterday of malignancy. The patient works as a [employer] . [de-identified] : poorly diff cholangioca [de-identified] : Since the last visit the pt wanted to hold chemotherapy or RT and returns complaining of dec appetite, weight loss abdominal discomfort and bloating.

## 2020-08-07 NOTE — ASSESSMENT
[FreeTextEntry1] : A discussion took place with the patient and also with his son who is on telephone call.  The patient has evidence of recurrent mass in the midepigastric abdominal area consistent with metastasis.  2 biopsies however were negative for malignancy.  The patient now is having increasing upper abdominal pain and repeat CAT scan would be recommended.  We would then discuss performing another biopsy.  We are concerned that his disease is symptomatic and will require treatment with chemotherapy and/or irradiation.  Once we have more  information further recommendations will be made. [Palliative] : Goals of care discussed with patient: Palliative [Palliative Care Plan] : not applicable at this time

## 2020-08-10 LAB
ALBUMIN SERPL ELPH-MCNC: 3.9 G/DL
ALP BLD-CCNC: 83 U/L
ALT SERPL-CCNC: 19 U/L
ANION GAP SERPL CALC-SCNC: 14 MMOL/L
APTT BLD: 34.3 SEC
AST SERPL-CCNC: 29 U/L
BILIRUB SERPL-MCNC: 1 MG/DL
BUN SERPL-MCNC: 10 MG/DL
CALCIUM SERPL-MCNC: 9 MG/DL
CANCER AG19-9 SERPL-ACNC: 381 U/ML
CEA SERPL-MCNC: 5.4 NG/ML
CHLORIDE SERPL-SCNC: 100 MMOL/L
CO2 SERPL-SCNC: 26 MMOL/L
CREAT SERPL-MCNC: 0.9 MG/DL
GLUCOSE SERPL-MCNC: 106 MG/DL
INR PPP: 1.15 RATIO
POTASSIUM SERPL-SCNC: 2.9 MMOL/L
PROT SERPL-MCNC: 7.2 G/DL
PT BLD: 13.6 SEC
SODIUM SERPL-SCNC: 140 MMOL/L

## 2020-08-15 ENCOUNTER — OUTPATIENT (OUTPATIENT)
Dept: OUTPATIENT SERVICES | Facility: HOSPITAL | Age: 65
LOS: 1 days | End: 2020-08-15
Payer: MEDICAID

## 2020-08-15 ENCOUNTER — APPOINTMENT (OUTPATIENT)
Dept: CT IMAGING | Facility: IMAGING CENTER | Age: 65
End: 2020-08-15
Payer: MEDICAID

## 2020-08-15 DIAGNOSIS — Z98.890 OTHER SPECIFIED POSTPROCEDURAL STATES: Chronic | ICD-10-CM

## 2020-08-15 DIAGNOSIS — Z00.8 ENCOUNTER FOR OTHER GENERAL EXAMINATION: ICD-10-CM

## 2020-08-15 PROCEDURE — 71260 CT THORAX DX C+: CPT

## 2020-08-15 PROCEDURE — 74178 CT ABD&PLV WO CNTR FLWD CNTR: CPT | Mod: 26

## 2020-08-15 PROCEDURE — 71260 CT THORAX DX C+: CPT | Mod: 26

## 2020-08-15 PROCEDURE — 74178 CT ABD&PLV WO CNTR FLWD CNTR: CPT

## 2020-08-19 LAB
ANION GAP SERPL CALC-SCNC: 13 MMOL/L
BUN SERPL-MCNC: 13 MG/DL
CALCIUM SERPL-MCNC: 8.9 MG/DL
CHLORIDE SERPL-SCNC: 102 MMOL/L
CO2 SERPL-SCNC: 26 MMOL/L
CREAT SERPL-MCNC: 0.9 MG/DL
GLUCOSE SERPL-MCNC: 140 MG/DL
POTASSIUM SERPL-SCNC: 3.9 MMOL/L
SODIUM SERPL-SCNC: 141 MMOL/L

## 2020-08-20 LAB
ANION GAP SERPL CALC-SCNC: 12 MMOL/L
APTT BLD: 35.4 SEC
BASOPHILS # BLD AUTO: 0.05 K/UL
BASOPHILS NFR BLD AUTO: 1 %
BUN SERPL-MCNC: 8 MG/DL
CALCIUM SERPL-MCNC: 9.1 MG/DL
CHLORIDE SERPL-SCNC: 103 MMOL/L
CO2 SERPL-SCNC: 26 MMOL/L
CREAT SERPL-MCNC: 0.58 MG/DL
EOSINOPHIL # BLD AUTO: 0.38 K/UL
EOSINOPHIL NFR BLD AUTO: 7.3 %
GLUCOSE SERPL-MCNC: 87 MG/DL
HCT VFR BLD CALC: 37.3 %
HGB BLD-MCNC: 12.2 G/DL
IMM GRANULOCYTES NFR BLD AUTO: 0.2 %
INR PPP: 1.14 RATIO
LYMPHOCYTES # BLD AUTO: 1.51 K/UL
LYMPHOCYTES NFR BLD AUTO: 29.1 %
MAN DIFF?: NORMAL
MCHC RBC-ENTMCNC: 28.7 PG
MCHC RBC-ENTMCNC: 32.7 GM/DL
MCV RBC AUTO: 87.8 FL
MONOCYTES # BLD AUTO: 0.56 K/UL
MONOCYTES NFR BLD AUTO: 10.8 %
NEUTROPHILS # BLD AUTO: 2.68 K/UL
NEUTROPHILS NFR BLD AUTO: 51.6 %
PLATELET # BLD AUTO: 173 K/UL
POTASSIUM SERPL-SCNC: 3.4 MMOL/L
PT BLD: 13 SEC
RBC # BLD: 4.25 M/UL
RBC # FLD: 15.5 %
SODIUM SERPL-SCNC: 141 MMOL/L
WBC # FLD AUTO: 5.19 K/UL

## 2020-09-22 ENCOUNTER — OUTPATIENT (OUTPATIENT)
Dept: OUTPATIENT SERVICES | Facility: HOSPITAL | Age: 65
LOS: 1 days | Discharge: HOME | End: 2020-09-22

## 2020-09-22 DIAGNOSIS — C22.1 INTRAHEPATIC BILE DUCT CARCINOMA: ICD-10-CM

## 2020-09-22 DIAGNOSIS — Z98.890 OTHER SPECIFIED POSTPROCEDURAL STATES: Chronic | ICD-10-CM

## 2020-09-24 ENCOUNTER — APPOINTMENT (OUTPATIENT)
Dept: HEMATOLOGY ONCOLOGY | Facility: CLINIC | Age: 65
End: 2020-09-24
Payer: MEDICAID

## 2020-09-24 VITALS
RESPIRATION RATE: 16 BRPM | OXYGEN SATURATION: 99 % | SYSTOLIC BLOOD PRESSURE: 144 MMHG | DIASTOLIC BLOOD PRESSURE: 73 MMHG | WEIGHT: 154.32 LBS | TEMPERATURE: 98.4 F | HEART RATE: 84 BPM | HEIGHT: 63.78 IN | BODY MASS INDEX: 26.67 KG/M2

## 2020-09-24 PROCEDURE — 99214 OFFICE O/P EST MOD 30 MIN: CPT

## 2020-09-24 NOTE — ASSESSMENT
[FreeTextEntry1] : A discussion took place with the patient and his son who was on teleconference.  The patient will make an appointment with Dr. Lezama for radiation therapy consultation.  We will also repeat his CAT scan of the abdomen to assess for change in the upper abdominal brian mass.  This currently is causing some discomfort and fullness in his chest.  We will also be discussing chemotherapy including Xeloda depending on the type of radiation he requires and also systemic IV chemotherapy with Gemzar cisplatin or clinical trial.  Once we have more information final recommendations will be made. [Palliative] : Goals of care discussed with patient: Palliative [Palliative Care Plan] : not applicable at this time

## 2020-09-24 NOTE — HISTORY OF PRESENT ILLNESS
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 His history dates back 6 weeks ago, December 2015, when he noted the onset of severe itching. He was seen in the emergency room on December 23 with jaundice and CAT scan revealed marked intrahepatic biliary ductal dilatation to the level of the biliary confluence. The CAT scan of the chest revealed a 6 mm linear opacity in the right upper lobe. MRI of the abdomen confirmed a biliary ductal dilatation to the level of the biliary hilum with an area of delayed enhancement measuring 3 x 1.9 cm. Thrombosis of the left portal vein was seen. 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 The patient underwent stent placement which improved his itching and jaundice. Cytology was obtained which was consistent with carcinoma.\par \par On January 5, 2016,  the patient underwent partial hepatectomy including the left lobe and  gallbladder, removal of the extrahepatic bile duct, hepaticojejunostomy to the Ju-en-Y, reconstruction of the portal vein with saphenous vein patch. The pathology revealed moderate to poorly differentiated cholangiocarcinoma involving the perihilar bile ducts. This tumor extended to the wall of the portal vein. The hepatic margin of resection was negative. 2 lymph nodes were negative. The bladder was nonmalignant. The tumor extended into the adjacent hepatic parenchyma, into the surrounding adipose tissue and extended into the interlobular bile ducts with extensive perineural invasion. The bile duct margins were negative. The tumor measured 2.9 x 2.4 cm. Staging was T3 N0.\par \par Patient did well postoperatively. Overall he lost about 20 pounds but now his appetite is improving and he feels stronger. His wound is healing well and he denies nausea, vomiting or diarrhea. There is no previous history of prior liver disease, gallbladder disease or GI disease. There is no family yesterday of malignancy. The patient works as a [employer] . [de-identified] : poorly diff cholangioca [de-identified] : Since the last visit the pt returns to discuss further therapy for his recurrent abdominal mass .

## 2020-10-27 ENCOUNTER — OUTPATIENT (OUTPATIENT)
Dept: OUTPATIENT SERVICES | Facility: HOSPITAL | Age: 65
LOS: 1 days | Discharge: HOME | End: 2020-10-27

## 2020-10-27 DIAGNOSIS — C22.1 INTRAHEPATIC BILE DUCT CARCINOMA: ICD-10-CM

## 2020-10-27 DIAGNOSIS — Z98.890 OTHER SPECIFIED POSTPROCEDURAL STATES: Chronic | ICD-10-CM

## 2020-10-28 ENCOUNTER — RESULT REVIEW (OUTPATIENT)
Age: 65
End: 2020-10-28

## 2020-10-28 ENCOUNTER — APPOINTMENT (OUTPATIENT)
Dept: HEMATOLOGY ONCOLOGY | Facility: CLINIC | Age: 65
End: 2020-10-28
Payer: MEDICAID

## 2020-10-28 VITALS
RESPIRATION RATE: 16 BRPM | OXYGEN SATURATION: 100 % | HEART RATE: 77 BPM | HEIGHT: 63.78 IN | WEIGHT: 156.53 LBS | DIASTOLIC BLOOD PRESSURE: 78 MMHG | TEMPERATURE: 98.4 F | SYSTOLIC BLOOD PRESSURE: 143 MMHG | BODY MASS INDEX: 27.05 KG/M2

## 2020-10-28 LAB
BASOPHILS # BLD AUTO: 0.03 K/UL — SIGNIFICANT CHANGE UP (ref 0–0.2)
BASOPHILS NFR BLD AUTO: 0.6 % — SIGNIFICANT CHANGE UP (ref 0–2)
EOSINOPHIL # BLD AUTO: 0.35 K/UL — SIGNIFICANT CHANGE UP (ref 0–0.5)
EOSINOPHIL NFR BLD AUTO: 7.3 % — HIGH (ref 0–6)
HCT VFR BLD CALC: 28.7 % — LOW (ref 39–50)
HGB BLD-MCNC: 9.6 G/DL — LOW (ref 13–17)
IMM GRANULOCYTES NFR BLD AUTO: 0.4 % — SIGNIFICANT CHANGE UP (ref 0–1.5)
LYMPHOCYTES # BLD AUTO: 0.94 K/UL — LOW (ref 1–3.3)
LYMPHOCYTES # BLD AUTO: 19.5 % — SIGNIFICANT CHANGE UP (ref 13–44)
MCHC RBC-ENTMCNC: 30.6 PG — SIGNIFICANT CHANGE UP (ref 27–34)
MCHC RBC-ENTMCNC: 33.4 G/DL — SIGNIFICANT CHANGE UP (ref 32–36)
MCV RBC AUTO: 91.4 FL — SIGNIFICANT CHANGE UP (ref 80–100)
MONOCYTES # BLD AUTO: 0.49 K/UL — SIGNIFICANT CHANGE UP (ref 0–0.9)
MONOCYTES NFR BLD AUTO: 10.2 % — SIGNIFICANT CHANGE UP (ref 2–14)
NEUTROPHILS # BLD AUTO: 2.99 K/UL — SIGNIFICANT CHANGE UP (ref 1.8–7.4)
NEUTROPHILS NFR BLD AUTO: 62 % — SIGNIFICANT CHANGE UP (ref 43–77)
NRBC # BLD: 0 /100 WBCS — SIGNIFICANT CHANGE UP (ref 0–0)
PLATELET # BLD AUTO: 115 K/UL — LOW (ref 150–400)
RBC # BLD: 3.14 M/UL — LOW (ref 4.2–5.8)
RBC # FLD: 15.2 % — HIGH (ref 10.3–14.5)
WBC # BLD: 4.82 K/UL — SIGNIFICANT CHANGE UP (ref 3.8–10.5)
WBC # FLD AUTO: 4.82 K/UL — SIGNIFICANT CHANGE UP (ref 3.8–10.5)

## 2020-10-28 PROCEDURE — 99072 ADDL SUPL MATRL&STAF TM PHE: CPT

## 2020-10-28 PROCEDURE — 99214 OFFICE O/P EST MOD 30 MIN: CPT

## 2020-10-28 NOTE — ASSESSMENT
[Palliative] : Goals of care discussed with patient: Palliative [Palliative Care Plan] : not applicable at this time [FreeTextEntry1] : We repeat his CAT scan of the abdomen to assess for change in the upper abdominal brian mass.  This currently is causing some discomfort and fullness in his chest.  We will also be discussing chemotherapy including Xeloda depending on the type of radiation he requires and also systemic IV chemotherapy with Gemzar cisplatin or clinical trial.  Once we have more information final recommendations will be made. RTO in 1 month

## 2020-10-28 NOTE — HISTORY OF PRESENT ILLNESS
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 His history dates back 6 weeks ago, December 2015, when he noted the onset of severe itching. He was seen in the emergency room on December 23 with jaundice and CAT scan revealed marked intrahepatic biliary ductal dilatation to the level of the biliary confluence. The CAT scan of the chest revealed a 6 mm linear opacity in the right upper lobe. MRI of the abdomen confirmed a biliary ductal dilatation to the level of the biliary hilum with an area of delayed enhancement measuring 3 x 1.9 cm. Thrombosis of the left portal vein was seen. 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 The patient underwent stent placement which improved his itching and jaundice. Cytology was obtained which was consistent with carcinoma.\par \par On January 5, 2016,  the patient underwent partial hepatectomy including the left lobe and  gallbladder, removal of the extrahepatic bile duct, hepaticojejunostomy to the Ju-en-Y, reconstruction of the portal vein with saphenous vein patch. The pathology revealed moderate to poorly differentiated cholangiocarcinoma involving the perihilar bile ducts. This tumor extended to the wall of the portal vein. The hepatic margin of resection was negative. 2 lymph nodes were negative. The bladder was nonmalignant. The tumor extended into the adjacent hepatic parenchyma, into the surrounding adipose tissue and extended into the interlobular bile ducts with extensive perineural invasion. The bile duct margins were negative. The tumor measured 2.9 x 2.4 cm. Staging was T3 N0.\par \par Patient did well postoperatively. Overall he lost about 20 pounds but now his appetite is improving and he feels stronger. His wound is healing well and he denies nausea, vomiting or diarrhea. There is no previous history of prior liver disease, gallbladder disease or GI disease. There is no family yesterday of malignancy. The patient works as a [employer] . [de-identified] : poorly diff cholangioca [de-identified] : Patient is here for f/u. He feels increase fatigue/weakness. He had weight loss of 22 lbs in 1 year. He had decreased appetite. After eating, he has itchy to body that last about 30 minutes. BM's are normal.

## 2020-10-28 NOTE — REVIEW OF SYSTEMS
[Negative] : Allergic/Immunologic [Fatigue] : fatigue [Recent Change In Weight] : ~T recent weight change [Fever] : no fever [Chills] : no chills [Skin Rash] : no skin rash [Skin Wound] : no skin wound [FreeTextEntry2] : 22 lbs loss in year, decreased appetite [de-identified] : +itchy skin

## 2020-10-29 ENCOUNTER — OUTPATIENT (OUTPATIENT)
Dept: OUTPATIENT SERVICES | Facility: HOSPITAL | Age: 65
LOS: 1 days | End: 2020-10-29
Payer: MEDICAID

## 2020-10-29 ENCOUNTER — APPOINTMENT (OUTPATIENT)
Dept: CT IMAGING | Facility: IMAGING CENTER | Age: 65
End: 2020-10-29
Payer: MEDICAID

## 2020-10-29 DIAGNOSIS — Z98.890 OTHER SPECIFIED POSTPROCEDURAL STATES: Chronic | ICD-10-CM

## 2020-10-29 DIAGNOSIS — R19.00 INTRA-ABDOMINAL AND PELVIC SWELLING, MASS AND LUMP, UNSPECIFIED SITE: ICD-10-CM

## 2020-10-29 DIAGNOSIS — C22.1 INTRAHEPATIC BILE DUCT CARCINOMA: ICD-10-CM

## 2020-10-29 PROCEDURE — 82565 ASSAY OF CREATININE: CPT

## 2020-10-29 PROCEDURE — 71260 CT THORAX DX C+: CPT | Mod: 26

## 2020-10-29 PROCEDURE — 71260 CT THORAX DX C+: CPT

## 2020-10-29 PROCEDURE — 74177 CT ABD & PELVIS W/CONTRAST: CPT

## 2020-10-29 PROCEDURE — 74177 CT ABD & PELVIS W/CONTRAST: CPT | Mod: 26

## 2020-11-01 LAB
ALBUMIN SERPL ELPH-MCNC: 4 G/DL
ALP BLD-CCNC: 94 U/L
ALT SERPL-CCNC: 20 U/L
ANION GAP SERPL CALC-SCNC: 10 MMOL/L
AST SERPL-CCNC: 28 U/L
BILIRUB SERPL-MCNC: 0.8 MG/DL
BUN SERPL-MCNC: 18 MG/DL
CALCIUM SERPL-MCNC: 9.2 MG/DL
CANCER AG19-9 SERPL-ACNC: 447 U/ML
CEA SERPL-MCNC: 8.3 NG/ML
CHLORIDE SERPL-SCNC: 103 MMOL/L
CO2 SERPL-SCNC: 28 MMOL/L
CREAT SERPL-MCNC: 0.91 MG/DL
GLUCOSE SERPL-MCNC: 92 MG/DL
POTASSIUM SERPL-SCNC: 3.9 MMOL/L
PROT SERPL-MCNC: 7 G/DL
SODIUM SERPL-SCNC: 142 MMOL/L

## 2020-11-06 ENCOUNTER — APPOINTMENT (OUTPATIENT)
Dept: RADIATION ONCOLOGY | Facility: CLINIC | Age: 65
End: 2020-11-06
Payer: MEDICAID

## 2020-11-06 PROCEDURE — 99214 OFFICE O/P EST MOD 30 MIN: CPT | Mod: GC,95

## 2020-11-06 NOTE — REVIEW OF SYSTEMS
[Chest Pain] : chest pain [Abdominal Pain] : abdominal pain [Joint Pain] : joint pain [Muscle Weakness] : muscle weakness [Disturbance Of Gait] : gait disturbance [Difficulty Walking] : difficulty walking [Easy Bruising] : a tendency for easy bruising [Negative] : Integumentary [Constipation: Grade 0] : Constipation: Grade 0 [Dysphagia: Grade 0] : Dysphagia: Grade 0 [Nausea: Grade 0] : Nausea: Grade 0 [Vomiting: Grade 0] : Vomiting: Grade 0 [Fatigue: Grade 1 - Fatigue relieved by rest] : Fatigue: Grade 1 - Fatigue relieved by rest [Cough: Grade 0] : Cough: Grade 0 [Dyspnea: Grade 0] : Dyspnea: Grade 0

## 2020-11-06 NOTE — VITALS
[Maximal Pain Intensity: 5/10] : 5/10 [Least Pain Intensity: 2/10] : 2/10 [Pain Description/Quality: ___] : Pain description/quality: [unfilled] [Pain Duration: ___] : Pain duration: [unfilled] [Pain Location: ___] : Pain Location: [unfilled] [NoTreatment Scheduled] : no treatment scheduled [80: Normal activity with effort; some signs or symptoms of disease.] : 80: Normal activity with effort; some signs or symptoms of disease.  [ECOG Performance Status: 1 - Restricted in physically strenuous activity but ambulatory and able to carry out work of a light or sedentary nature] : Performance Status: 1 - Restricted in physically strenuous activity but ambulatory and able to carry out work of a light or sedentary nature, e.g., light house work, office work

## 2020-11-06 NOTE — REASON FOR VISIT
[Other: ___] : [unfilled] [Family Member] : family member [Consideration of Curative Therapy] : consideration of curative therapy for

## 2020-11-06 NOTE — DISEASE MANAGEMENT
[Clinical] : TNM Stage: c [FreeTextEntry4] : recurrent cholangiocarcinoma [TTNM] : x [NTNM] : x [MTNM] : 1 [N/A] : Currently not applicable

## 2020-11-06 NOTE — HISTORY OF PRESENT ILLNESS
[Home] : at home, [unfilled] , at the time of the visit. [Family Member] : family member [Medical Office: ([facility])___] : at the medical office located in  [Verbal consent obtained from patient] : the patient, [unfilled] [FreeTextEntry4] : Donnell Perez [FreeTextEntry1] : Mr. Riddle is a 65 year old man with a history of cholangiocarcinoma diagnosed in 2015 s/p partial hepatectomy/cholecystectomy including the left lobe of the liver, gallbladder, extrahepatic bile duct, and hepaticojejunostomy to the Ju-en-Y with portal vein reconstruction by saphenous vein graft, with negative hepatic margin and lymph node sampling. Pathology revealed poorly differentiated cholangiocarcinoma extending to the wall of the portal vein. Surgery was followed by 6 cycles of systemic therapy in 2016. He has since been followed on surveillance. CT 4/22/20 demonstrated enlargement of a previously identified retroperitoneal soft tissue mass, now measuring 4.3x3.8cm with encasement of the right hepatic artery, right gastric artery, left renal vein/IVC junction, and reconstructed main portal vein, contiguous with the celiac axis, pancreatic neck, and efferent loop of the jejunostomy.\par \par He underwent FNA on 3/31/20 which proved non-diagnostic. CT guided biopsy 5/19/20 was suspicious for but not diagnostic for metastatic adenocarcinoma. He now presents for consideration of radiotherapy.

## 2020-11-10 ENCOUNTER — APPOINTMENT (OUTPATIENT)
Dept: HEMATOLOGY ONCOLOGY | Facility: CLINIC | Age: 65
End: 2020-11-10
Payer: MEDICAID

## 2020-11-10 ENCOUNTER — RESULT REVIEW (OUTPATIENT)
Age: 65
End: 2020-11-10

## 2020-11-10 VITALS
OXYGEN SATURATION: 98 % | HEIGHT: 65.35 IN | DIASTOLIC BLOOD PRESSURE: 75 MMHG | SYSTOLIC BLOOD PRESSURE: 144 MMHG | TEMPERATURE: 98.3 F | BODY MASS INDEX: 26.27 KG/M2 | RESPIRATION RATE: 16 BRPM | WEIGHT: 159.61 LBS | HEART RATE: 84 BPM

## 2020-11-10 LAB
BASOPHILS # BLD AUTO: 0.02 K/UL — SIGNIFICANT CHANGE UP (ref 0–0.2)
BASOPHILS NFR BLD AUTO: 0.4 % — SIGNIFICANT CHANGE UP (ref 0–2)
EOSINOPHIL # BLD AUTO: 0.27 K/UL — SIGNIFICANT CHANGE UP (ref 0–0.5)
EOSINOPHIL NFR BLD AUTO: 5.3 % — SIGNIFICANT CHANGE UP (ref 0–6)
HCT VFR BLD CALC: 25.6 % — LOW (ref 39–50)
HGB BLD-MCNC: 8.6 G/DL — LOW (ref 13–17)
IMM GRANULOCYTES NFR BLD AUTO: 0.2 % — SIGNIFICANT CHANGE UP (ref 0–1.5)
LYMPHOCYTES # BLD AUTO: 0.76 K/UL — LOW (ref 1–3.3)
LYMPHOCYTES # BLD AUTO: 15 % — SIGNIFICANT CHANGE UP (ref 13–44)
MCHC RBC-ENTMCNC: 31.2 PG — SIGNIFICANT CHANGE UP (ref 27–34)
MCHC RBC-ENTMCNC: 33.6 G/DL — SIGNIFICANT CHANGE UP (ref 32–36)
MCV RBC AUTO: 92.8 FL — SIGNIFICANT CHANGE UP (ref 80–100)
MONOCYTES # BLD AUTO: 0.55 K/UL — SIGNIFICANT CHANGE UP (ref 0–0.9)
MONOCYTES NFR BLD AUTO: 10.8 % — SIGNIFICANT CHANGE UP (ref 2–14)
NEUTROPHILS # BLD AUTO: 3.46 K/UL — SIGNIFICANT CHANGE UP (ref 1.8–7.4)
NEUTROPHILS NFR BLD AUTO: 68.3 % — SIGNIFICANT CHANGE UP (ref 43–77)
NRBC # BLD: 0 /100 WBCS — SIGNIFICANT CHANGE UP (ref 0–0)
PLATELET # BLD AUTO: 121 K/UL — LOW (ref 150–400)
RBC # BLD: 2.76 M/UL — LOW (ref 4.2–5.8)
RBC # FLD: 14.3 % — SIGNIFICANT CHANGE UP (ref 10.3–14.5)
WBC # BLD: 5.07 K/UL — SIGNIFICANT CHANGE UP (ref 3.8–10.5)
WBC # FLD AUTO: 5.07 K/UL — SIGNIFICANT CHANGE UP (ref 3.8–10.5)

## 2020-11-10 PROCEDURE — 99072 ADDL SUPL MATRL&STAF TM PHE: CPT

## 2020-11-10 PROCEDURE — 99214 OFFICE O/P EST MOD 30 MIN: CPT

## 2020-11-10 NOTE — HISTORY OF PRESENT ILLNESS
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 His history dates back 6 weeks ago, December 2015, when he noted the onset of severe itching. He was seen in the emergency room on December 23 with jaundice and CAT scan revealed marked intrahepatic biliary ductal dilatation to the level of the biliary confluence. The CAT scan of the chest revealed a 6 mm linear opacity in the right upper lobe. MRI of the abdomen confirmed a biliary ductal dilatation to the level of the biliary hilum with an area of delayed enhancement measuring 3 x 1.9 cm. Thrombosis of the left portal vein was seen. 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 The patient underwent stent placement which improved his itching and jaundice. Cytology was obtained which was consistent with carcinoma.\par \par On January 5, 2016,  the patient underwent partial hepatectomy including the left lobe and  gallbladder, removal of the extrahepatic bile duct, hepaticojejunostomy to the Ju-en-Y, reconstruction of the portal vein with saphenous vein patch. The pathology revealed moderate to poorly differentiated cholangiocarcinoma involving the perihilar bile ducts. This tumor extended to the wall of the portal vein. The hepatic margin of resection was negative. 2 lymph nodes were negative. The bladder was nonmalignant. The tumor extended into the adjacent hepatic parenchyma, into the surrounding adipose tissue and extended into the interlobular bile ducts with extensive perineural invasion. The bile duct margins were negative. The tumor measured 2.9 x 2.4 cm. Staging was T3 N0.\par \par Patient did well postoperatively. Overall he lost about 20 pounds but now his appetite is improving and he feels stronger. His wound is healing well and he denies nausea, vomiting or diarrhea. There is no previous history of prior liver disease, gallbladder disease or GI disease. There is no family yesterday of malignancy. The patient works as a [employer] . [de-identified] : poorly diff cholangioca [de-identified] : Pt returns to discuss chemo

## 2020-11-15 LAB
ALBUMIN SERPL ELPH-MCNC: 3.8 G/DL
ALP BLD-CCNC: 90 U/L
ALT SERPL-CCNC: 20 U/L
ANION GAP SERPL CALC-SCNC: 10 MMOL/L
APTT BLD: 33.5 SEC
AST SERPL-CCNC: 23 U/L
BILIRUB SERPL-MCNC: 0.6 MG/DL
BUN SERPL-MCNC: 19 MG/DL
CALCIUM SERPL-MCNC: 8.8 MG/DL
CANCER AG19-9 SERPL-ACNC: 430 U/ML
CEA SERPL-MCNC: 7.4 NG/ML
CHLORIDE SERPL-SCNC: 106 MMOL/L
CO2 SERPL-SCNC: 26 MMOL/L
CREAT SERPL-MCNC: 0.97 MG/DL
GLUCOSE SERPL-MCNC: 78 MG/DL
INR PPP: 1.04 RATIO
POTASSIUM SERPL-SCNC: 3.5 MMOL/L
PROT SERPL-MCNC: 6.7 G/DL
PT BLD: 12.3 SEC
SODIUM SERPL-SCNC: 142 MMOL/L

## 2020-11-15 NOTE — HISTORY OF PRESENT ILLNESS
[Home] : at home, [unfilled] , at the time of the visit. [Medical Office: ([facility])___] : at the medical office located in  [Verbal consent obtained from patient] : the patient, [unfilled] [FreeTextEntry4] : Donnell Myers RN [FreeTextEntry1] : Mr. Riddle is a 65 year old man with a history of cholangiocarcinoma diagnosed in 2015 s/p partial hepatectomy/cholecystectomy including the left lobe of the liver, gallbladder, extrahepatic bile duct, and hepaticojejunostomy to the Ju-en-Y with portal vein reconstruction by saphenous vein graft, with negative hepatic margin and lymph node sampling. Pathology revealed poorly differentiated cholangiocarcinoma extending to the wall of the portal vein. Surgery was followed by 6 cycles of systemic therapy in 2016. He has since been followed on surveillance. CT 4/22/20 demonstrated enlargement of a previously identified retroperitoneal soft tissue mass, now measuring 4.3x3.8cm with encasement of the right hepatic artery, right gastric artery, left renal vein/IVC junction, and reconstructed main portal vein, contiguous with the celiac axis, pancreatic neck, and efferent loop of the jejunostomy.\par \par He underwent FNA on 3/31/20 which proved non-diagnostic. CT guided biopsy 5/19/20 was consistent with metastatic adenocarcinoma. He was seen on 06/02/2020 on consult. He had a repeat PET/CT on 06/11/2020 and that showed stable mass encasing the blood vessels adjacent to the liver. At that time patient and his family has requested that we delay treatment temporarily. \par \par He is currently having more abdominal discomfort and chest fullness. He has decreased appetite. Has pruritus after meals. He is also complaining of dependant pedal edema L > R. He states that it is because of vein graft from the left leg. \par \par He underwent CT chest/abd and pelvis on 10/29/2020, it showed retroperitoneal brian mass encasing the hepatic artery, slightly increased in size since 8/15/2020. A retrocaval lymph node has also slightly increased in size. Small volume ascites, slightly increased. Ill-defined low-attenuation is again noted along the liver resection margin. Small left supraclavicular lymph nodes.

## 2020-11-15 NOTE — DISEASE MANAGEMENT
[Clinical] : TNM Stage: c [IV] : IV [FreeTextEntry4] : cholangiocarcinoma [TTNM] : x [NTNM] : x [MTNM] : 1

## 2020-11-23 ENCOUNTER — APPOINTMENT (OUTPATIENT)
Dept: HEMATOLOGY ONCOLOGY | Facility: CLINIC | Age: 65
End: 2020-11-23

## 2020-12-02 ENCOUNTER — RESULT REVIEW (OUTPATIENT)
Age: 65
End: 2020-12-02

## 2020-12-02 ENCOUNTER — APPOINTMENT (OUTPATIENT)
Dept: ULTRASOUND IMAGING | Facility: IMAGING CENTER | Age: 65
End: 2020-12-02
Payer: MEDICAID

## 2020-12-02 ENCOUNTER — OUTPATIENT (OUTPATIENT)
Dept: OUTPATIENT SERVICES | Facility: HOSPITAL | Age: 65
LOS: 1 days | End: 2020-12-02
Payer: MEDICAID

## 2020-12-02 DIAGNOSIS — Z98.890 OTHER SPECIFIED POSTPROCEDURAL STATES: Chronic | ICD-10-CM

## 2020-12-02 DIAGNOSIS — C22.1 INTRAHEPATIC BILE DUCT CARCINOMA: ICD-10-CM

## 2020-12-02 PROCEDURE — 88305 TISSUE EXAM BY PATHOLOGIST: CPT

## 2020-12-02 PROCEDURE — 88305 TISSUE EXAM BY PATHOLOGIST: CPT | Mod: 26

## 2020-12-02 PROCEDURE — 88173 CYTOPATH EVAL FNA REPORT: CPT | Mod: 26

## 2020-12-02 PROCEDURE — 88172 CYTP DX EVAL FNA 1ST EA SITE: CPT

## 2020-12-02 PROCEDURE — 88173 CYTOPATH EVAL FNA REPORT: CPT

## 2020-12-02 PROCEDURE — 10005 FNA BX W/US GDN 1ST LES: CPT

## 2020-12-03 LAB — NON-GYNECOLOGICAL CYTOLOGY STUDY: SIGNIFICANT CHANGE UP

## 2020-12-10 ENCOUNTER — OUTPATIENT (OUTPATIENT)
Dept: OUTPATIENT SERVICES | Facility: HOSPITAL | Age: 65
LOS: 1 days | Discharge: HOME | End: 2020-12-10
Payer: MEDICAID

## 2020-12-10 DIAGNOSIS — C22.1 INTRAHEPATIC BILE DUCT CARCINOMA: ICD-10-CM

## 2020-12-10 DIAGNOSIS — Z98.890 OTHER SPECIFIED POSTPROCEDURAL STATES: Chronic | ICD-10-CM

## 2020-12-14 ENCOUNTER — APPOINTMENT (OUTPATIENT)
Dept: INFUSION THERAPY | Facility: HOSPITAL | Age: 65
End: 2020-12-14

## 2020-12-14 ENCOUNTER — LABORATORY RESULT (OUTPATIENT)
Age: 65
End: 2020-12-14

## 2020-12-15 ENCOUNTER — APPOINTMENT (OUTPATIENT)
Dept: INFUSION THERAPY | Facility: HOSPITAL | Age: 65
End: 2020-12-15

## 2020-12-15 ENCOUNTER — APPOINTMENT (OUTPATIENT)
Dept: DISASTER EMERGENCY | Facility: CLINIC | Age: 65
End: 2020-12-15

## 2020-12-17 ENCOUNTER — LABORATORY RESULT (OUTPATIENT)
Age: 65
End: 2020-12-17

## 2020-12-17 ENCOUNTER — RESULT REVIEW (OUTPATIENT)
Age: 65
End: 2020-12-17

## 2020-12-17 ENCOUNTER — APPOINTMENT (OUTPATIENT)
Dept: INFUSION THERAPY | Facility: HOSPITAL | Age: 65
End: 2020-12-17

## 2020-12-17 DIAGNOSIS — Z51.11 ENCOUNTER FOR ANTINEOPLASTIC CHEMOTHERAPY: ICD-10-CM

## 2020-12-17 DIAGNOSIS — R11.2 NAUSEA WITH VOMITING, UNSPECIFIED: ICD-10-CM

## 2020-12-17 LAB
BASOPHILS # BLD AUTO: 0.03 K/UL — SIGNIFICANT CHANGE UP (ref 0–0.2)
BASOPHILS NFR BLD AUTO: 0.6 % — SIGNIFICANT CHANGE UP (ref 0–2)
EOSINOPHIL # BLD AUTO: 0.3 K/UL — SIGNIFICANT CHANGE UP (ref 0–0.5)
EOSINOPHIL NFR BLD AUTO: 6.4 % — HIGH (ref 0–6)
HCT VFR BLD CALC: 28.9 % — LOW (ref 39–50)
HGB BLD-MCNC: 9.7 G/DL — LOW (ref 13–17)
IMM GRANULOCYTES NFR BLD AUTO: 0.9 % — SIGNIFICANT CHANGE UP (ref 0–1.5)
LYMPHOCYTES # BLD AUTO: 0.69 K/UL — LOW (ref 1–3.3)
LYMPHOCYTES # BLD AUTO: 14.8 % — SIGNIFICANT CHANGE UP (ref 13–44)
MCHC RBC-ENTMCNC: 30.8 PG — SIGNIFICANT CHANGE UP (ref 27–34)
MCHC RBC-ENTMCNC: 33.6 G/DL — SIGNIFICANT CHANGE UP (ref 32–36)
MCV RBC AUTO: 91.7 FL — SIGNIFICANT CHANGE UP (ref 80–100)
MONOCYTES # BLD AUTO: 0.5 K/UL — SIGNIFICANT CHANGE UP (ref 0–0.9)
MONOCYTES NFR BLD AUTO: 10.7 % — SIGNIFICANT CHANGE UP (ref 2–14)
NEUTROPHILS # BLD AUTO: 3.11 K/UL — SIGNIFICANT CHANGE UP (ref 1.8–7.4)
NEUTROPHILS NFR BLD AUTO: 66.6 % — SIGNIFICANT CHANGE UP (ref 43–77)
NRBC # BLD: 0 /100 WBCS — SIGNIFICANT CHANGE UP (ref 0–0)
PLATELET # BLD AUTO: 105 K/UL — LOW (ref 150–400)
RBC # BLD: 3.15 M/UL — LOW (ref 4.2–5.8)
RBC # FLD: 14.2 % — SIGNIFICANT CHANGE UP (ref 10.3–14.5)
WBC # BLD: 4.67 K/UL — SIGNIFICANT CHANGE UP (ref 3.8–10.5)
WBC # FLD AUTO: 4.67 K/UL — SIGNIFICANT CHANGE UP (ref 3.8–10.5)

## 2020-12-17 PROCEDURE — 93010 ELECTROCARDIOGRAM REPORT: CPT

## 2020-12-18 ENCOUNTER — NON-APPOINTMENT (OUTPATIENT)
Age: 65
End: 2020-12-18

## 2020-12-22 ENCOUNTER — LABORATORY RESULT (OUTPATIENT)
Age: 65
End: 2020-12-22

## 2020-12-22 ENCOUNTER — APPOINTMENT (OUTPATIENT)
Dept: INFUSION THERAPY | Facility: HOSPITAL | Age: 65
End: 2020-12-22

## 2020-12-22 ENCOUNTER — RESULT REVIEW (OUTPATIENT)
Age: 65
End: 2020-12-22

## 2020-12-22 DIAGNOSIS — E86.0 DEHYDRATION: ICD-10-CM

## 2020-12-22 LAB
BUN SERPL-MCNC: 19 MG/DL — SIGNIFICANT CHANGE UP (ref 7–23)
CA-I BLDA-SCNC: 1.18 MMOL/L — SIGNIFICANT CHANGE UP (ref 1.12–1.3)
CHLORIDE SERPL-SCNC: 103 MMOL/L — SIGNIFICANT CHANGE UP (ref 96–108)
CO2 SERPL-SCNC: 27 MMOL/L — SIGNIFICANT CHANGE UP (ref 22–31)
CREAT SERPL-MCNC: 0.7 MG/DL — SIGNIFICANT CHANGE UP (ref 0.5–1.3)
GLUCOSE SERPL-MCNC: 158 MG/DL — HIGH (ref 70–99)
POTASSIUM SERPL-MCNC: 3.3 MMOL/L — LOW (ref 3.5–5.3)
POTASSIUM SERPL-SCNC: 3.3 MMOL/L — LOW (ref 3.5–5.3)
SODIUM SERPL-SCNC: 138 MMOL/L — SIGNIFICANT CHANGE UP (ref 135–145)

## 2020-12-26 ENCOUNTER — APPOINTMENT (OUTPATIENT)
Dept: INFUSION THERAPY | Facility: HOSPITAL | Age: 65
End: 2020-12-26

## 2020-12-26 ENCOUNTER — RESULT REVIEW (OUTPATIENT)
Age: 65
End: 2020-12-26

## 2020-12-26 LAB
BASOPHILS # BLD AUTO: 0.01 K/UL — SIGNIFICANT CHANGE UP (ref 0–0.2)
BASOPHILS NFR BLD AUTO: 0.3 % — SIGNIFICANT CHANGE UP (ref 0–2)
EOSINOPHIL # BLD AUTO: 0.31 K/UL — SIGNIFICANT CHANGE UP (ref 0–0.5)
EOSINOPHIL NFR BLD AUTO: 8.8 % — HIGH (ref 0–6)
HCT VFR BLD CALC: 27.4 % — LOW (ref 39–50)
HGB BLD-MCNC: 9.3 G/DL — LOW (ref 13–17)
IMM GRANULOCYTES NFR BLD AUTO: 0.3 % — SIGNIFICANT CHANGE UP (ref 0–1.5)
LYMPHOCYTES # BLD AUTO: 0.81 K/UL — LOW (ref 1–3.3)
LYMPHOCYTES # BLD AUTO: 22.9 % — SIGNIFICANT CHANGE UP (ref 13–44)
MCHC RBC-ENTMCNC: 31.1 PG — SIGNIFICANT CHANGE UP (ref 27–34)
MCHC RBC-ENTMCNC: 33.9 G/DL — SIGNIFICANT CHANGE UP (ref 32–36)
MCV RBC AUTO: 91.6 FL — SIGNIFICANT CHANGE UP (ref 80–100)
MONOCYTES # BLD AUTO: 0.3 K/UL — SIGNIFICANT CHANGE UP (ref 0–0.9)
MONOCYTES NFR BLD AUTO: 8.5 % — SIGNIFICANT CHANGE UP (ref 2–14)
NEUTROPHILS # BLD AUTO: 2.09 K/UL — SIGNIFICANT CHANGE UP (ref 1.8–7.4)
NEUTROPHILS NFR BLD AUTO: 59.2 % — SIGNIFICANT CHANGE UP (ref 43–77)
NRBC # BLD: 0 /100 WBCS — SIGNIFICANT CHANGE UP (ref 0–0)
PLATELET # BLD AUTO: 67 K/UL — LOW (ref 150–400)
RBC # BLD: 2.99 M/UL — LOW (ref 4.2–5.8)
RBC # FLD: 13.7 % — SIGNIFICANT CHANGE UP (ref 10.3–14.5)
WBC # BLD: 3.53 K/UL — LOW (ref 3.8–10.5)
WBC # FLD AUTO: 3.53 K/UL — LOW (ref 3.8–10.5)

## 2020-12-27 LAB
BACTERIA STL CULT: NORMAL
C DIFF TOXIN B QL PCR REFLEX: NORMAL
DEPRECATED O AND P PREP STL: NORMAL
GDH ANTIGEN: NOT DETECTED
TOXIN A AND B: NOT DETECTED

## 2020-12-28 ENCOUNTER — APPOINTMENT (OUTPATIENT)
Dept: INFUSION THERAPY | Facility: HOSPITAL | Age: 65
End: 2020-12-28

## 2021-01-02 ENCOUNTER — RESULT REVIEW (OUTPATIENT)
Age: 66
End: 2021-01-02

## 2021-01-02 ENCOUNTER — NON-APPOINTMENT (OUTPATIENT)
Age: 66
End: 2021-01-02

## 2021-01-02 ENCOUNTER — LABORATORY RESULT (OUTPATIENT)
Age: 66
End: 2021-01-02

## 2021-01-02 ENCOUNTER — APPOINTMENT (OUTPATIENT)
Dept: INFUSION THERAPY | Facility: HOSPITAL | Age: 66
End: 2021-01-02

## 2021-01-02 LAB
BASOPHILS # BLD AUTO: 0.03 K/UL — SIGNIFICANT CHANGE UP (ref 0–0.2)
BASOPHILS NFR BLD AUTO: 1 % — SIGNIFICANT CHANGE UP (ref 0–2)
EOSINOPHIL # BLD AUTO: 0.44 K/UL — SIGNIFICANT CHANGE UP (ref 0–0.5)
EOSINOPHIL NFR BLD AUTO: 15 % — HIGH (ref 0–6)
HCT VFR BLD CALC: 27.8 % — LOW (ref 39–50)
HGB BLD-MCNC: 9.3 G/DL — LOW (ref 13–17)
LYMPHOCYTES # BLD AUTO: 0.59 K/UL — LOW (ref 1–3.3)
LYMPHOCYTES # BLD AUTO: 20 % — SIGNIFICANT CHANGE UP (ref 13–44)
MCHC RBC-ENTMCNC: 30.7 PG — SIGNIFICANT CHANGE UP (ref 27–34)
MCHC RBC-ENTMCNC: 33.5 G/DL — SIGNIFICANT CHANGE UP (ref 32–36)
MCV RBC AUTO: 91.7 FL — SIGNIFICANT CHANGE UP (ref 80–100)
MONOCYTES # BLD AUTO: 0.38 K/UL — SIGNIFICANT CHANGE UP (ref 0–0.9)
MONOCYTES NFR BLD AUTO: 13 % — SIGNIFICANT CHANGE UP (ref 2–14)
MYELOCYTES NFR BLD: 1 % — HIGH (ref 0–0)
NEUTROPHILS # BLD AUTO: 1.48 K/UL — LOW (ref 1.8–7.4)
NEUTROPHILS NFR BLD AUTO: 49 % — SIGNIFICANT CHANGE UP (ref 43–77)
NEUTS BAND # BLD: 1 % — SIGNIFICANT CHANGE UP (ref 0–8)
NRBC # BLD: 0 /100 — SIGNIFICANT CHANGE UP (ref 0–0)
NRBC # BLD: SIGNIFICANT CHANGE UP /100 WBCS (ref 0–0)
PLAT MORPH BLD: NORMAL — SIGNIFICANT CHANGE UP
PLATELET # BLD AUTO: 172 K/UL — SIGNIFICANT CHANGE UP (ref 150–400)
RBC # BLD: 3.03 M/UL — LOW (ref 4.2–5.8)
RBC # FLD: 14.1 % — SIGNIFICANT CHANGE UP (ref 10.3–14.5)
RBC BLD AUTO: SIGNIFICANT CHANGE UP
WBC # BLD: 2.95 K/UL — LOW (ref 3.8–10.5)
WBC # FLD AUTO: 2.95 K/UL — LOW (ref 3.8–10.5)

## 2021-01-06 ENCOUNTER — NON-APPOINTMENT (OUTPATIENT)
Age: 66
End: 2021-01-06

## 2021-01-07 ENCOUNTER — RESULT REVIEW (OUTPATIENT)
Age: 66
End: 2021-01-07

## 2021-01-07 ENCOUNTER — APPOINTMENT (OUTPATIENT)
Dept: HEMATOLOGY ONCOLOGY | Facility: CLINIC | Age: 66
End: 2021-01-07
Payer: MEDICAID

## 2021-01-07 ENCOUNTER — APPOINTMENT (OUTPATIENT)
Dept: INFUSION THERAPY | Facility: HOSPITAL | Age: 66
End: 2021-01-07

## 2021-01-07 ENCOUNTER — LABORATORY RESULT (OUTPATIENT)
Age: 66
End: 2021-01-07

## 2021-01-07 LAB
BASOPHILS # BLD AUTO: 0.05 K/UL — SIGNIFICANT CHANGE UP (ref 0–0.2)
BASOPHILS NFR BLD AUTO: 1 % — SIGNIFICANT CHANGE UP (ref 0–2)
EOSINOPHIL # BLD AUTO: 0.38 K/UL — SIGNIFICANT CHANGE UP (ref 0–0.5)
EOSINOPHIL NFR BLD AUTO: 7.4 % — HIGH (ref 0–6)
HCT VFR BLD CALC: 28.2 % — LOW (ref 39–50)
HGB BLD-MCNC: 9.4 G/DL — LOW (ref 13–17)
IMM GRANULOCYTES NFR BLD AUTO: 1 % — SIGNIFICANT CHANGE UP (ref 0–1.5)
LYMPHOCYTES # BLD AUTO: 0.99 K/UL — LOW (ref 1–3.3)
LYMPHOCYTES # BLD AUTO: 19.2 % — SIGNIFICANT CHANGE UP (ref 13–44)
MCHC RBC-ENTMCNC: 30.6 PG — SIGNIFICANT CHANGE UP (ref 27–34)
MCHC RBC-ENTMCNC: 33.3 G/DL — SIGNIFICANT CHANGE UP (ref 32–36)
MCV RBC AUTO: 91.9 FL — SIGNIFICANT CHANGE UP (ref 80–100)
MONOCYTES # BLD AUTO: 0.68 K/UL — SIGNIFICANT CHANGE UP (ref 0–0.9)
MONOCYTES NFR BLD AUTO: 13.2 % — SIGNIFICANT CHANGE UP (ref 2–14)
NEUTROPHILS # BLD AUTO: 3.01 K/UL — SIGNIFICANT CHANGE UP (ref 1.8–7.4)
NEUTROPHILS NFR BLD AUTO: 58.2 % — SIGNIFICANT CHANGE UP (ref 43–77)
NRBC # BLD: 0 /100 WBCS — SIGNIFICANT CHANGE UP (ref 0–0)
PLATELET # BLD AUTO: 189 K/UL — SIGNIFICANT CHANGE UP (ref 150–400)
RBC # BLD: 3.07 M/UL — LOW (ref 4.2–5.8)
RBC # FLD: 14.3 % — SIGNIFICANT CHANGE UP (ref 10.3–14.5)
WBC # BLD: 5.16 K/UL — SIGNIFICANT CHANGE UP (ref 3.8–10.5)
WBC # FLD AUTO: 5.16 K/UL — SIGNIFICANT CHANGE UP (ref 3.8–10.5)

## 2021-01-07 PROCEDURE — 99214 OFFICE O/P EST MOD 30 MIN: CPT

## 2021-01-09 ENCOUNTER — OUTPATIENT (OUTPATIENT)
Dept: OUTPATIENT SERVICES | Facility: HOSPITAL | Age: 66
LOS: 1 days | Discharge: HOME | End: 2021-01-09

## 2021-01-09 DIAGNOSIS — Z98.890 OTHER SPECIFIED POSTPROCEDURAL STATES: Chronic | ICD-10-CM

## 2021-01-09 DIAGNOSIS — C22.1 INTRAHEPATIC BILE DUCT CARCINOMA: ICD-10-CM

## 2021-01-09 NOTE — REVIEW OF SYSTEMS
[Fatigue] : fatigue [Recent Change In Weight] : ~T recent weight change [Negative] : Allergic/Immunologic [Fever] : no fever [Chills] : no chills [Skin Rash] : no skin rash [Skin Wound] : no skin wound [FreeTextEntry2] : 22 lbs loss in year, decreased appetite [de-identified] : +itchy skin

## 2021-01-09 NOTE — ASSESSMENT
[Palliative] : Goals of care discussed with patient: Palliative [Palliative Care Plan] : not applicable at this time [FreeTextEntry1] : Patient will continue treatment of Gemzar/Abraxane. Will monitor for side effects/toxicities. Labs ordered f/u. Advised patient to take Imodium as directed for diarrhea, and Metoclopramide 10 mg qid prn nausea. RTO in 1 month

## 2021-01-09 NOTE — DISCUSSION/SUMMARY
[FreeTextEntry1] : Pt had supraclavicular node bx and neg for malignancy. Pt aware. Will speak to son re chemo and  then RT.

## 2021-01-09 NOTE — HISTORY OF PRESENT ILLNESS
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 His history dates back 6 weeks ago, December 2015, when he noted the onset of severe itching. He was seen in the emergency room on December 23 with jaundice and CAT scan revealed marked intrahepatic biliary ductal dilatation to the level of the biliary confluence. The CAT scan of the chest revealed a 6 mm linear opacity in the right upper lobe. MRI of the abdomen confirmed a biliary ductal dilatation to the level of the biliary hilum with an area of delayed enhancement measuring 3 x 1.9 cm. Thrombosis of the left portal vein was seen. 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 The patient underwent stent placement which improved his itching and jaundice. Cytology was obtained which was consistent with carcinoma.\par \par On January 5, 2016,  the patient underwent partial hepatectomy including the left lobe and  gallbladder, removal of the extrahepatic bile duct, hepaticojejunostomy to the Ju-en-Y, reconstruction of the portal vein with saphenous vein patch. The pathology revealed moderate to poorly differentiated cholangiocarcinoma involving the perihilar bile ducts. This tumor extended to the wall of the portal vein. The hepatic margin of resection was negative. 2 lymph nodes were negative. The bladder was nonmalignant. The tumor extended into the adjacent hepatic parenchyma, into the surrounding adipose tissue and extended into the interlobular bile ducts with extensive perineural invasion. The bile duct margins were negative. The tumor measured 2.9 x 2.4 cm. Staging was T3 N0.\par \par Patient did well postoperatively. Overall he lost about 20 pounds but now his appetite is improving and he feels stronger. His wound is healing well and he denies nausea, vomiting or diarrhea. There is no previous history of prior liver disease, gallbladder disease or GI disease. There is no family yesterday of malignancy. The patient works as a [employer] . [de-identified] : poorly diff cholangioca [FreeTextEntry1] : Gemzar/Abraxane [de-identified] : S/p C1 D8 Gemzar/Abraxane, chemotherapy was held on D 15 due low CBC ANC=1.48 on 1/2/2021. Patient was seen in the treatment room. He c/o weight loss, and he had one episode of diarrhea last week. Also, he had some nausea, but no vomiting.

## 2021-01-13 ENCOUNTER — APPOINTMENT (OUTPATIENT)
Dept: INFUSION THERAPY | Facility: HOSPITAL | Age: 66
End: 2021-01-13

## 2021-01-13 ENCOUNTER — LABORATORY RESULT (OUTPATIENT)
Age: 66
End: 2021-01-13

## 2021-01-14 ENCOUNTER — LABORATORY RESULT (OUTPATIENT)
Age: 66
End: 2021-01-14

## 2021-01-14 ENCOUNTER — RESULT REVIEW (OUTPATIENT)
Age: 66
End: 2021-01-14

## 2021-01-14 ENCOUNTER — APPOINTMENT (OUTPATIENT)
Dept: INFUSION THERAPY | Facility: HOSPITAL | Age: 66
End: 2021-01-14

## 2021-01-14 LAB
BASOPHILS # BLD AUTO: 0.03 K/UL — SIGNIFICANT CHANGE UP (ref 0–0.2)
BASOPHILS NFR BLD AUTO: 0.9 % — SIGNIFICANT CHANGE UP (ref 0–2)
EOSINOPHIL # BLD AUTO: 0.18 K/UL — SIGNIFICANT CHANGE UP (ref 0–0.5)
EOSINOPHIL NFR BLD AUTO: 5.5 % — SIGNIFICANT CHANGE UP (ref 0–6)
HCT VFR BLD CALC: 25.2 % — LOW (ref 39–50)
HGB BLD-MCNC: 8.8 G/DL — LOW (ref 13–17)
IMM GRANULOCYTES NFR BLD AUTO: 5.2 % — HIGH (ref 0–1.5)
LYMPHOCYTES # BLD AUTO: 0.64 K/UL — LOW (ref 1–3.3)
LYMPHOCYTES # BLD AUTO: 19.5 % — SIGNIFICANT CHANGE UP (ref 13–44)
MCHC RBC-ENTMCNC: 30.9 PG — SIGNIFICANT CHANGE UP (ref 27–34)
MCHC RBC-ENTMCNC: 34.9 G/DL — SIGNIFICANT CHANGE UP (ref 32–36)
MCV RBC AUTO: 88.4 FL — SIGNIFICANT CHANGE UP (ref 80–100)
MONOCYTES # BLD AUTO: 0.26 K/UL — SIGNIFICANT CHANGE UP (ref 0–0.9)
MONOCYTES NFR BLD AUTO: 7.9 % — SIGNIFICANT CHANGE UP (ref 2–14)
NEUTROPHILS # BLD AUTO: 2 K/UL — SIGNIFICANT CHANGE UP (ref 1.8–7.4)
NEUTROPHILS NFR BLD AUTO: 61 % — SIGNIFICANT CHANGE UP (ref 43–77)
NRBC # BLD: 3 /100 WBCS — HIGH (ref 0–0)
PLATELET # BLD AUTO: 89 K/UL — LOW (ref 150–400)
RBC # BLD: 2.85 M/UL — LOW (ref 4.2–5.8)
RBC # FLD: 13.6 % — SIGNIFICANT CHANGE UP (ref 10.3–14.5)
WBC # BLD: 3.28 K/UL — LOW (ref 3.8–10.5)
WBC # FLD AUTO: 3.28 K/UL — LOW (ref 3.8–10.5)

## 2021-01-15 DIAGNOSIS — Z51.11 ENCOUNTER FOR ANTINEOPLASTIC CHEMOTHERAPY: ICD-10-CM

## 2021-01-15 DIAGNOSIS — R11.2 NAUSEA WITH VOMITING, UNSPECIFIED: ICD-10-CM

## 2021-01-19 ENCOUNTER — RESULT REVIEW (OUTPATIENT)
Age: 66
End: 2021-01-19

## 2021-01-19 ENCOUNTER — OUTPATIENT (OUTPATIENT)
Dept: OUTPATIENT SERVICES | Facility: HOSPITAL | Age: 66
LOS: 1 days | End: 2021-01-19
Payer: MEDICAID

## 2021-01-19 ENCOUNTER — APPOINTMENT (OUTPATIENT)
Dept: HEMATOLOGY ONCOLOGY | Facility: CLINIC | Age: 66
End: 2021-01-19
Payer: MEDICAID

## 2021-01-19 VITALS
HEART RATE: 83 BPM | HEIGHT: 64 IN | WEIGHT: 158.73 LBS | TEMPERATURE: 98.8 F | DIASTOLIC BLOOD PRESSURE: 73 MMHG | BODY MASS INDEX: 27.1 KG/M2 | OXYGEN SATURATION: 99 % | SYSTOLIC BLOOD PRESSURE: 158 MMHG | RESPIRATION RATE: 16 BRPM

## 2021-01-19 DIAGNOSIS — C22.1 INTRAHEPATIC BILE DUCT CARCINOMA: ICD-10-CM

## 2021-01-19 DIAGNOSIS — Z98.890 OTHER SPECIFIED POSTPROCEDURAL STATES: Chronic | ICD-10-CM

## 2021-01-19 LAB
BASOPHILS # BLD AUTO: 0 K/UL — SIGNIFICANT CHANGE UP (ref 0–0.2)
BASOPHILS NFR BLD AUTO: 0 % — SIGNIFICANT CHANGE UP (ref 0–2)
EOSINOPHIL # BLD AUTO: 0.4 K/UL — SIGNIFICANT CHANGE UP (ref 0–0.5)
EOSINOPHIL NFR BLD AUTO: 16 % — HIGH (ref 0–6)
HCT VFR BLD CALC: 23.7 % — LOW (ref 39–50)
HGB BLD-MCNC: 8 G/DL — LOW (ref 13–17)
LYMPHOCYTES # BLD AUTO: 0.53 K/UL — LOW (ref 1–3.3)
LYMPHOCYTES # BLD AUTO: 21 % — SIGNIFICANT CHANGE UP (ref 13–44)
MCHC RBC-ENTMCNC: 30.8 PG — SIGNIFICANT CHANGE UP (ref 27–34)
MCHC RBC-ENTMCNC: 33.8 G/DL — SIGNIFICANT CHANGE UP (ref 32–36)
MCV RBC AUTO: 91.2 FL — SIGNIFICANT CHANGE UP (ref 80–100)
MONOCYTES # BLD AUTO: 0.03 K/UL — SIGNIFICANT CHANGE UP (ref 0–0.9)
MONOCYTES NFR BLD AUTO: 1 % — LOW (ref 2–14)
NEUTROPHILS # BLD AUTO: 1.57 K/UL — LOW (ref 1.8–7.4)
NEUTROPHILS NFR BLD AUTO: 62 % — SIGNIFICANT CHANGE UP (ref 43–77)
NRBC # BLD: 0 /100 — SIGNIFICANT CHANGE UP (ref 0–0)
NRBC # BLD: SIGNIFICANT CHANGE UP /100 WBCS (ref 0–0)
PLAT MORPH BLD: NORMAL — SIGNIFICANT CHANGE UP
PLATELET # BLD AUTO: 60 K/UL — LOW (ref 150–400)
RBC # BLD: 2.6 M/UL — LOW (ref 4.2–5.8)
RBC # FLD: 13.8 % — SIGNIFICANT CHANGE UP (ref 10.3–14.5)
RBC BLD AUTO: SIGNIFICANT CHANGE UP
WBC # BLD: 2.53 K/UL — LOW (ref 3.8–10.5)
WBC # FLD AUTO: 2.53 K/UL — LOW (ref 3.8–10.5)

## 2021-01-19 PROCEDURE — 99072 ADDL SUPL MATRL&STAF TM PHE: CPT

## 2021-01-19 PROCEDURE — 99213 OFFICE O/P EST LOW 20 MIN: CPT

## 2021-01-19 NOTE — ASSESSMENT
[Palliative] : Goals of care discussed with patient: Palliative [Palliative Care Plan] : not applicable at this time [FreeTextEntry1] : Patient will continue treatment of Gemzar/Abraxane. Will monitor for side effects/toxicities.  Today Hgb=8.0, PLT=60. Advised transfusion of PRBC x 1, but patient wants to wait to see, if counts will go up by next week. Ordered anemic work up , f/u. Chemotherapy will be held on Thursday x 1 week. RTO in 1 month.

## 2021-01-19 NOTE — REVIEW OF SYSTEMS
[Fatigue] : fatigue [Abdominal Pain] : abdominal pain [Diarrhea] : diarrhea [Negative] : Allergic/Immunologic [Fever] : no fever [Chills] : no chills [Recent Change In Weight] : ~T no recent weight change [Vomiting] : no vomiting [Constipation] : no constipation [Skin Rash] : no skin rash [Skin Wound] : no skin wound [FreeTextEntry2] : weakness [FreeTextEntry7] : diarrhea x 2

## 2021-01-19 NOTE — HISTORY OF PRESENT ILLNESS
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 His history dates back 6 weeks ago, December 2015, when he noted the onset of severe itching. He was seen in the emergency room on December 23 with jaundice and CAT scan revealed marked intrahepatic biliary ductal dilatation to the level of the biliary confluence. The CAT scan of the chest revealed a 6 mm linear opacity in the right upper lobe. MRI of the abdomen confirmed a biliary ductal dilatation to the level of the biliary hilum with an area of delayed enhancement measuring 3 x 1.9 cm. Thrombosis of the left portal vein was seen. 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 The patient underwent stent placement which improved his itching and jaundice. Cytology was obtained which was consistent with carcinoma.\par \par On January 5, 2016,  the patient underwent partial hepatectomy including the left lobe and  gallbladder, removal of the extrahepatic bile duct, hepaticojejunostomy to the Ju-en-Y, reconstruction of the portal vein with saphenous vein patch. The pathology revealed moderate to poorly differentiated cholangiocarcinoma involving the perihilar bile ducts. This tumor extended to the wall of the portal vein. The hepatic margin of resection was negative. 2 lymph nodes were negative. The bladder was nonmalignant. The tumor extended into the adjacent hepatic parenchyma, into the surrounding adipose tissue and extended into the interlobular bile ducts with extensive perineural invasion. The bile duct margins were negative. The tumor measured 2.9 x 2.4 cm. Staging was T3 N0.\par \par Patient did well postoperatively. Overall he lost about 20 pounds but now his appetite is improving and he feels stronger. His wound is healing well and he denies nausea, vomiting or diarrhea. There is no previous history of prior liver disease, gallbladder disease or GI disease. There is no family yesterday of malignancy. The patient works as a [employer] . [de-identified] : poorly diff cholangioca [FreeTextEntry1] : Gemzar/Abraxane [de-identified] : S/p C1 D15 Gemzar/Abraxane, patient has come for blood check. He received treatment on January 14, 2021,  and Hgb=8.8. As per son, patient has increased weakness and fatigue. Also, he had two episodes of diarrhea. Denies any blood stools. He still has pain to lower abdomen which radiates to his back.

## 2021-01-19 NOTE — PHYSICAL EXAM
[Normal] : affect appropriate [Ambulatory and capable of all self care but unable to carry out any work activities] : Status 2- Ambulatory and capable of all self care but unable to carry out any work activities. Up and about more than 50% of waking hours [de-identified] : 1+ edema to LE

## 2021-01-20 ENCOUNTER — RESULT REVIEW (OUTPATIENT)
Age: 66
End: 2021-01-20

## 2021-01-20 LAB
ALBUMIN SERPL ELPH-MCNC: 3.5 G/DL
ALP BLD-CCNC: 82 U/L
ALT SERPL-CCNC: 24 U/L
ANION GAP SERPL CALC-SCNC: 10 MMOL/L
AST SERPL-CCNC: 26 U/L
BILIRUB SERPL-MCNC: 0.7 MG/DL
BUN SERPL-MCNC: 12 MG/DL
CALCIUM SERPL-MCNC: 8.6 MG/DL
CANCER AG19-9 SERPL-ACNC: 310 U/ML
CEA SERPL-MCNC: 8.8 NG/ML
CHLORIDE SERPL-SCNC: 106 MMOL/L
CO2 SERPL-SCNC: 23 MMOL/L
CREAT SERPL-MCNC: 0.91 MG/DL
FERRITIN SERPL-MCNC: 262 NG/ML
FOLATE SERPL-MCNC: 14.2 NG/ML
GLUCOSE SERPL-MCNC: 120 MG/DL
IRON SATN MFR SERPL: 10 %
IRON SERPL-MCNC: 32 UG/DL
OB PNL STL: NEGATIVE — SIGNIFICANT CHANGE UP
OB PNL STL: NEGATIVE — SIGNIFICANT CHANGE UP
OB PNL STL: POSITIVE
POTASSIUM SERPL-SCNC: 3.6 MMOL/L
PROT SERPL-MCNC: 6.5 G/DL
SODIUM SERPL-SCNC: 139 MMOL/L
TIBC SERPL-MCNC: 311 UG/DL
UIBC SERPL-MCNC: 279 UG/DL
VIT B12 SERPL-MCNC: 778 PG/ML

## 2021-01-21 ENCOUNTER — APPOINTMENT (OUTPATIENT)
Dept: INFUSION THERAPY | Facility: HOSPITAL | Age: 66
End: 2021-01-21

## 2021-01-28 ENCOUNTER — LABORATORY RESULT (OUTPATIENT)
Age: 66
End: 2021-01-28

## 2021-01-28 ENCOUNTER — RESULT REVIEW (OUTPATIENT)
Age: 66
End: 2021-01-28

## 2021-01-28 ENCOUNTER — APPOINTMENT (OUTPATIENT)
Dept: INFUSION THERAPY | Facility: HOSPITAL | Age: 66
End: 2021-01-28

## 2021-01-28 LAB
BASOPHILS # BLD AUTO: 0 K/UL — SIGNIFICANT CHANGE UP (ref 0–0.2)
BASOPHILS NFR BLD AUTO: 0 % — SIGNIFICANT CHANGE UP (ref 0–2)
EOSINOPHIL # BLD AUTO: 0.25 K/UL — SIGNIFICANT CHANGE UP (ref 0–0.5)
EOSINOPHIL NFR BLD AUTO: 5 % — SIGNIFICANT CHANGE UP (ref 0–6)
HCT VFR BLD CALC: 26.3 % — LOW (ref 39–50)
HGB BLD-MCNC: 8.9 G/DL — LOW (ref 13–17)
LYMPHOCYTES # BLD AUTO: 1.07 K/UL — SIGNIFICANT CHANGE UP (ref 1–3.3)
LYMPHOCYTES # BLD AUTO: 21 % — SIGNIFICANT CHANGE UP (ref 13–44)
MCHC RBC-ENTMCNC: 30.4 PG — SIGNIFICANT CHANGE UP (ref 27–34)
MCHC RBC-ENTMCNC: 33.8 G/DL — SIGNIFICANT CHANGE UP (ref 32–36)
MCV RBC AUTO: 89.8 FL — SIGNIFICANT CHANGE UP (ref 80–100)
MONOCYTES # BLD AUTO: 0.41 K/UL — SIGNIFICANT CHANGE UP (ref 0–0.9)
MONOCYTES NFR BLD AUTO: 8 % — SIGNIFICANT CHANGE UP (ref 2–14)
NEUTROPHILS # BLD AUTO: 3.36 K/UL — SIGNIFICANT CHANGE UP (ref 1.8–7.4)
NEUTROPHILS NFR BLD AUTO: 66 % — SIGNIFICANT CHANGE UP (ref 43–77)
NRBC # BLD: 0 /100 — SIGNIFICANT CHANGE UP (ref 0–0)
NRBC # BLD: SIGNIFICANT CHANGE UP /100 WBCS (ref 0–0)
PLAT MORPH BLD: NORMAL — SIGNIFICANT CHANGE UP
PLATELET # BLD AUTO: 191 K/UL — SIGNIFICANT CHANGE UP (ref 150–400)
RBC # BLD: 2.93 M/UL — LOW (ref 4.2–5.8)
RBC # FLD: 14.6 % — HIGH (ref 10.3–14.5)
RBC BLD AUTO: SIGNIFICANT CHANGE UP
WBC # BLD: 5.09 K/UL — SIGNIFICANT CHANGE UP (ref 3.8–10.5)
WBC # FLD AUTO: 5.09 K/UL — SIGNIFICANT CHANGE UP (ref 3.8–10.5)

## 2021-01-29 ENCOUNTER — RESULT REVIEW (OUTPATIENT)
Age: 66
End: 2021-01-29

## 2021-01-30 ENCOUNTER — RESULT REVIEW (OUTPATIENT)
Age: 66
End: 2021-01-30

## 2021-02-04 ENCOUNTER — APPOINTMENT (OUTPATIENT)
Dept: INFUSION THERAPY | Facility: HOSPITAL | Age: 66
End: 2021-02-04

## 2021-02-04 ENCOUNTER — RESULT REVIEW (OUTPATIENT)
Age: 66
End: 2021-02-04

## 2021-02-04 ENCOUNTER — LABORATORY RESULT (OUTPATIENT)
Age: 66
End: 2021-02-04

## 2021-02-04 DIAGNOSIS — E86.0 DEHYDRATION: ICD-10-CM

## 2021-02-04 LAB
BASOPHILS # BLD AUTO: 0.02 K/UL — SIGNIFICANT CHANGE UP (ref 0–0.2)
BASOPHILS NFR BLD AUTO: 0.7 % — SIGNIFICANT CHANGE UP (ref 0–2)
EOSINOPHIL # BLD AUTO: 0.23 K/UL — SIGNIFICANT CHANGE UP (ref 0–0.5)
EOSINOPHIL NFR BLD AUTO: 7.6 % — HIGH (ref 0–6)
HCT VFR BLD CALC: 24.7 % — LOW (ref 39–50)
HGB BLD-MCNC: 8.5 G/DL — LOW (ref 13–17)
IMM GRANULOCYTES NFR BLD AUTO: 1 % — SIGNIFICANT CHANGE UP (ref 0–1.5)
LYMPHOCYTES # BLD AUTO: 0.78 K/UL — LOW (ref 1–3.3)
LYMPHOCYTES # BLD AUTO: 25.9 % — SIGNIFICANT CHANGE UP (ref 13–44)
MCHC RBC-ENTMCNC: 30.4 PG — SIGNIFICANT CHANGE UP (ref 27–34)
MCHC RBC-ENTMCNC: 34.4 G/DL — SIGNIFICANT CHANGE UP (ref 32–36)
MCV RBC AUTO: 88.2 FL — SIGNIFICANT CHANGE UP (ref 80–100)
MONOCYTES # BLD AUTO: 0.24 K/UL — SIGNIFICANT CHANGE UP (ref 0–0.9)
MONOCYTES NFR BLD AUTO: 8 % — SIGNIFICANT CHANGE UP (ref 2–14)
NEUTROPHILS # BLD AUTO: 1.71 K/UL — LOW (ref 1.8–7.4)
NEUTROPHILS NFR BLD AUTO: 56.8 % — SIGNIFICANT CHANGE UP (ref 43–77)
NRBC # BLD: 0 /100 WBCS — SIGNIFICANT CHANGE UP (ref 0–0)
OB PNL STL: NEGATIVE — SIGNIFICANT CHANGE UP
PLATELET # BLD AUTO: 141 K/UL — LOW (ref 150–400)
RBC # BLD: 2.8 M/UL — LOW (ref 4.2–5.8)
RBC # FLD: 14.6 % — HIGH (ref 10.3–14.5)
WBC # BLD: 3.01 K/UL — LOW (ref 3.8–10.5)
WBC # FLD AUTO: 3.01 K/UL — LOW (ref 3.8–10.5)

## 2021-02-05 ENCOUNTER — NON-APPOINTMENT (OUTPATIENT)
Age: 66
End: 2021-02-05

## 2021-02-09 ENCOUNTER — RESULT REVIEW (OUTPATIENT)
Age: 66
End: 2021-02-09

## 2021-02-09 ENCOUNTER — APPOINTMENT (OUTPATIENT)
Dept: ULTRASOUND IMAGING | Facility: IMAGING CENTER | Age: 66
End: 2021-02-09
Payer: MEDICAID

## 2021-02-09 ENCOUNTER — OUTPATIENT (OUTPATIENT)
Dept: OUTPATIENT SERVICES | Facility: HOSPITAL | Age: 66
LOS: 1 days | End: 2021-02-09
Payer: MEDICAID

## 2021-02-09 ENCOUNTER — APPOINTMENT (OUTPATIENT)
Dept: HEMATOLOGY ONCOLOGY | Facility: CLINIC | Age: 66
End: 2021-02-09
Payer: MEDICAID

## 2021-02-09 VITALS
BODY MASS INDEX: 27.48 KG/M2 | HEART RATE: 76 BPM | DIASTOLIC BLOOD PRESSURE: 74 MMHG | TEMPERATURE: 98.6 F | OXYGEN SATURATION: 100 % | HEIGHT: 64.02 IN | RESPIRATION RATE: 16 BRPM | WEIGHT: 160.94 LBS | SYSTOLIC BLOOD PRESSURE: 164 MMHG

## 2021-02-09 DIAGNOSIS — Z98.890 OTHER SPECIFIED POSTPROCEDURAL STATES: Chronic | ICD-10-CM

## 2021-02-09 DIAGNOSIS — M79.89 OTHER SPECIFIED SOFT TISSUE DISORDERS: ICD-10-CM

## 2021-02-09 DIAGNOSIS — C22.1 INTRAHEPATIC BILE DUCT CARCINOMA: ICD-10-CM

## 2021-02-09 LAB
BASOPHILS # BLD AUTO: 0.03 K/UL — SIGNIFICANT CHANGE UP (ref 0–0.2)
BASOPHILS NFR BLD AUTO: 1 % — SIGNIFICANT CHANGE UP (ref 0–2)
EOSINOPHIL # BLD AUTO: 0.39 K/UL — SIGNIFICANT CHANGE UP (ref 0–0.5)
EOSINOPHIL NFR BLD AUTO: 12 % — HIGH (ref 0–6)
HCT VFR BLD CALC: 23.9 % — LOW (ref 39–50)
HGB BLD-MCNC: 8 G/DL — LOW (ref 13–17)
LYMPHOCYTES # BLD AUTO: 0.72 K/UL — LOW (ref 1–3.3)
LYMPHOCYTES # BLD AUTO: 22 % — SIGNIFICANT CHANGE UP (ref 13–44)
MCHC RBC-ENTMCNC: 30.2 PG — SIGNIFICANT CHANGE UP (ref 27–34)
MCHC RBC-ENTMCNC: 33.5 G/DL — SIGNIFICANT CHANGE UP (ref 32–36)
MCV RBC AUTO: 90.2 FL — SIGNIFICANT CHANGE UP (ref 80–100)
MONOCYTES # BLD AUTO: 0.2 K/UL — SIGNIFICANT CHANGE UP (ref 0–0.9)
MONOCYTES NFR BLD AUTO: 6 % — SIGNIFICANT CHANGE UP (ref 2–14)
NEUTROPHILS # BLD AUTO: 1.94 K/UL — SIGNIFICANT CHANGE UP (ref 1.8–7.4)
NEUTROPHILS NFR BLD AUTO: 59 % — SIGNIFICANT CHANGE UP (ref 43–77)
NRBC # BLD: 0 /100 — SIGNIFICANT CHANGE UP (ref 0–0)
NRBC # BLD: SIGNIFICANT CHANGE UP /100 WBCS (ref 0–0)
PLAT MORPH BLD: NORMAL — SIGNIFICANT CHANGE UP
PLATELET # BLD AUTO: 86 K/UL — LOW (ref 150–400)
RBC # BLD: 2.65 M/UL — LOW (ref 4.2–5.8)
RBC # FLD: 14.7 % — HIGH (ref 10.3–14.5)
RBC BLD AUTO: SIGNIFICANT CHANGE UP
WBC # BLD: 3.28 K/UL — LOW (ref 3.8–10.5)
WBC # FLD AUTO: 3.28 K/UL — LOW (ref 3.8–10.5)

## 2021-02-09 PROCEDURE — 93970 EXTREMITY STUDY: CPT | Mod: 26

## 2021-02-09 PROCEDURE — 99213 OFFICE O/P EST LOW 20 MIN: CPT

## 2021-02-09 PROCEDURE — 99072 ADDL SUPL MATRL&STAF TM PHE: CPT

## 2021-02-09 PROCEDURE — 93970 EXTREMITY STUDY: CPT

## 2021-02-09 NOTE — ASSESSMENT
[Palliative] : Goals of care discussed with patient: Palliative [Palliative Care Plan] : not applicable at this time [FreeTextEntry1] : Patient is s/p C2 Gemzar/Abraxane. Will monitor for side effects/toxicities.  Today Hgb=8.0, PLT=86. Patient was advised to have blood transfusion, but he still does not agreed. Left leg swelling ordered venous doppler, r/o DVT.  Patient will do one more treatment, and then, CT-Scans will be done to evaluate for progression vs regression of disease. RTO 1 [facility]

## 2021-02-09 NOTE — PHYSICAL EXAM
[Ambulatory and capable of all self care but unable to carry out any work activities] : Status 2- Ambulatory and capable of all self care but unable to carry out any work activities. Up and about more than 50% of waking hours [Normal] : affect appropriate [de-identified] : 2 swelling to left leg, no cord, nontender

## 2021-02-09 NOTE — REVIEW OF SYSTEMS
[Fatigue] : fatigue [Diarrhea] : diarrhea [Negative] : Allergic/Immunologic [Constipation] : constipation [Fever] : no fever [Chills] : no chills [Recent Change In Weight] : ~T no recent weight change [Abdominal Pain] : no abdominal pain [Vomiting] : no vomiting [Joint Pain] : no joint pain [Joint Stiffness] : no joint stiffness [Muscle Pain] : no muscle pain [Muscle Weakness] : no muscle weakness [Skin Rash] : no skin rash [Skin Wound] : no skin wound [FreeTextEntry2] : weakness [FreeTextEntry9] : Swelling to left leg

## 2021-02-09 NOTE — HISTORY OF PRESENT ILLNESS
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 His history dates back 6 weeks ago, December 2015, when he noted the onset of severe itching. He was seen in the emergency room on December 23 with jaundice and CAT scan revealed marked intrahepatic biliary ductal dilatation to the level of the biliary confluence. The CAT scan of the chest revealed a 6 mm linear opacity in the right upper lobe. MRI of the abdomen confirmed a biliary ductal dilatation to the level of the biliary hilum with an area of delayed enhancement measuring 3 x 1.9 cm. Thrombosis of the left portal vein was seen. 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 The patient underwent stent placement which improved his itching and jaundice. Cytology was obtained which was consistent with carcinoma.\par \par On January 5, 2016,  the patient underwent partial hepatectomy including the left lobe and  gallbladder, removal of the extrahepatic bile duct, hepaticojejunostomy to the Ju-en-Y, reconstruction of the portal vein with saphenous vein patch. The pathology revealed moderate to poorly differentiated cholangiocarcinoma involving the perihilar bile ducts. This tumor extended to the wall of the portal vein. The hepatic margin of resection was negative. 2 lymph nodes were negative. The bladder was nonmalignant. The tumor extended into the adjacent hepatic parenchyma, into the surrounding adipose tissue and extended into the interlobular bile ducts with extensive perineural invasion. The bile duct margins were negative. The tumor measured 2.9 x 2.4 cm. Staging was T3 N0.\par \par Patient did well postoperatively. Overall he lost about 20 pounds but now his appetite is improving and he feels stronger. His wound is healing well and he denies nausea, vomiting or diarrhea. There is no previous history of prior liver disease, gallbladder disease or GI disease. There is no family yesterday of malignancy. The patient works as a [employer] . [de-identified] : poorly diff cholangioca [FreeTextEntry1] : Gemzar/Abraxane [de-identified] : S/p C2 Gemzar/Abraxane. Patient c/o increased swelling to left leg and feet.  Also, he has increased fatigue/weakness. Appetite is okay. Today labs, Hgb=8.0 and PLT=86.

## 2021-02-10 ENCOUNTER — OUTPATIENT (OUTPATIENT)
Dept: OUTPATIENT SERVICES | Facility: HOSPITAL | Age: 66
LOS: 1 days | Discharge: HOME | End: 2021-02-10

## 2021-02-10 DIAGNOSIS — Z98.890 OTHER SPECIFIED POSTPROCEDURAL STATES: Chronic | ICD-10-CM

## 2021-02-10 DIAGNOSIS — E55.9 VITAMIN D DEFICIENCY, UNSPECIFIED: ICD-10-CM

## 2021-02-10 DIAGNOSIS — C22.1 INTRAHEPATIC BILE DUCT CARCINOMA: ICD-10-CM

## 2021-02-10 LAB
ALBUMIN SERPL ELPH-MCNC: 3.8 G/DL
ALP BLD-CCNC: 94 U/L
ALT SERPL-CCNC: 29 U/L
ANION GAP SERPL CALC-SCNC: 10 MMOL/L
AST SERPL-CCNC: 31 U/L
BILIRUB SERPL-MCNC: 0.7 MG/DL
BUN SERPL-MCNC: 10 MG/DL
CALCIUM SERPL-MCNC: 8.7 MG/DL
CANCER AG19-9 SERPL-ACNC: 215 U/ML
CEA SERPL-MCNC: 8 NG/ML
CHLORIDE SERPL-SCNC: 105 MMOL/L
CO2 SERPL-SCNC: 24 MMOL/L
CREAT SERPL-MCNC: 0.87 MG/DL
GLUCOSE SERPL-MCNC: 124 MG/DL
MAGNESIUM SERPL-MCNC: 2 MG/DL
POTASSIUM SERPL-SCNC: 3.9 MMOL/L
PROT SERPL-MCNC: 6.8 G/DL
SODIUM SERPL-SCNC: 139 MMOL/L

## 2021-02-11 ENCOUNTER — APPOINTMENT (OUTPATIENT)
Dept: INFUSION THERAPY | Facility: HOSPITAL | Age: 66
End: 2021-02-11

## 2021-02-16 ENCOUNTER — APPOINTMENT (OUTPATIENT)
Dept: HEMATOLOGY ONCOLOGY | Facility: CLINIC | Age: 66
End: 2021-02-16

## 2021-02-16 ENCOUNTER — LABORATORY RESULT (OUTPATIENT)
Age: 66
End: 2021-02-16

## 2021-02-20 ENCOUNTER — RESULT REVIEW (OUTPATIENT)
Age: 66
End: 2021-02-20

## 2021-02-20 ENCOUNTER — APPOINTMENT (OUTPATIENT)
Dept: INFUSION THERAPY | Facility: HOSPITAL | Age: 66
End: 2021-02-20

## 2021-02-20 ENCOUNTER — LABORATORY RESULT (OUTPATIENT)
Age: 66
End: 2021-02-20

## 2021-02-20 ENCOUNTER — OUTPATIENT (OUTPATIENT)
Dept: OUTPATIENT SERVICES | Facility: HOSPITAL | Age: 66
LOS: 1 days | End: 2021-02-20

## 2021-02-20 DIAGNOSIS — Z98.890 OTHER SPECIFIED POSTPROCEDURAL STATES: Chronic | ICD-10-CM

## 2021-02-20 DIAGNOSIS — C22.1 INTRAHEPATIC BILE DUCT CARCINOMA: ICD-10-CM

## 2021-02-20 LAB
BASOPHILS # BLD AUTO: 0.05 K/UL — SIGNIFICANT CHANGE UP (ref 0–0.2)
BASOPHILS NFR BLD AUTO: 1 % — SIGNIFICANT CHANGE UP (ref 0–2)
EOSINOPHIL # BLD AUTO: 0.57 K/UL — HIGH (ref 0–0.5)
EOSINOPHIL NFR BLD AUTO: 11.7 % — HIGH (ref 0–6)
HCT VFR BLD CALC: 24.9 % — LOW (ref 39–50)
HGB BLD-MCNC: 8.4 G/DL — LOW (ref 13–17)
IMM GRANULOCYTES NFR BLD AUTO: 1.6 % — HIGH (ref 0–1.5)
LYMPHOCYTES # BLD AUTO: 0.81 K/UL — LOW (ref 1–3.3)
LYMPHOCYTES # BLD AUTO: 16.6 % — SIGNIFICANT CHANGE UP (ref 13–44)
MCHC RBC-ENTMCNC: 29.8 PG — SIGNIFICANT CHANGE UP (ref 27–34)
MCHC RBC-ENTMCNC: 33.7 G/DL — SIGNIFICANT CHANGE UP (ref 32–36)
MCV RBC AUTO: 88.3 FL — SIGNIFICANT CHANGE UP (ref 80–100)
MONOCYTES # BLD AUTO: 0.66 K/UL — SIGNIFICANT CHANGE UP (ref 0–0.9)
MONOCYTES NFR BLD AUTO: 13.5 % — SIGNIFICANT CHANGE UP (ref 2–14)
NEUTROPHILS # BLD AUTO: 2.72 K/UL — SIGNIFICANT CHANGE UP (ref 1.8–7.4)
NEUTROPHILS NFR BLD AUTO: 55.6 % — SIGNIFICANT CHANGE UP (ref 43–77)
NRBC # BLD: 0 /100 WBCS — SIGNIFICANT CHANGE UP (ref 0–0)
PLATELET # BLD AUTO: 201 K/UL — SIGNIFICANT CHANGE UP (ref 150–400)
RBC # BLD: 2.82 M/UL — LOW (ref 4.2–5.8)
RBC # FLD: 15.6 % — HIGH (ref 10.3–14.5)
WBC # BLD: 4.89 K/UL — SIGNIFICANT CHANGE UP (ref 3.8–10.5)
WBC # FLD AUTO: 4.89 K/UL — SIGNIFICANT CHANGE UP (ref 3.8–10.5)

## 2021-02-20 PROCEDURE — 86923 COMPATIBILITY TEST ELECTRIC: CPT

## 2021-02-20 PROCEDURE — 86900 BLOOD TYPING SEROLOGIC ABO: CPT

## 2021-02-20 PROCEDURE — 86901 BLOOD TYPING SEROLOGIC RH(D): CPT

## 2021-02-20 PROCEDURE — 86850 RBC ANTIBODY SCREEN: CPT

## 2021-02-23 ENCOUNTER — APPOINTMENT (OUTPATIENT)
Dept: INFUSION THERAPY | Facility: HOSPITAL | Age: 66
End: 2021-02-23

## 2021-02-26 ENCOUNTER — RESULT REVIEW (OUTPATIENT)
Age: 66
End: 2021-02-26

## 2021-02-26 ENCOUNTER — APPOINTMENT (OUTPATIENT)
Dept: HEMATOLOGY ONCOLOGY | Facility: CLINIC | Age: 66
End: 2021-02-26
Payer: MEDICAID

## 2021-02-26 VITALS
WEIGHT: 162.04 LBS | HEART RATE: 68 BPM | OXYGEN SATURATION: 100 % | TEMPERATURE: 98.4 F | RESPIRATION RATE: 16 BRPM | BODY MASS INDEX: 27.66 KG/M2 | SYSTOLIC BLOOD PRESSURE: 164 MMHG | DIASTOLIC BLOOD PRESSURE: 85 MMHG | HEIGHT: 63.98 IN

## 2021-02-26 LAB
BASOPHILS # BLD AUTO: 0.06 K/UL — SIGNIFICANT CHANGE UP (ref 0–0.2)
BASOPHILS NFR BLD AUTO: 1.1 % — SIGNIFICANT CHANGE UP (ref 0–2)
EOSINOPHIL # BLD AUTO: 0.6 K/UL — HIGH (ref 0–0.5)
EOSINOPHIL NFR BLD AUTO: 10.8 % — HIGH (ref 0–6)
HCT VFR BLD CALC: 28.6 % — LOW (ref 39–50)
HGB BLD-MCNC: 9.4 G/DL — LOW (ref 13–17)
IMM GRANULOCYTES NFR BLD AUTO: 0.7 % — SIGNIFICANT CHANGE UP (ref 0–1.5)
LYMPHOCYTES # BLD AUTO: 0.8 K/UL — LOW (ref 1–3.3)
LYMPHOCYTES # BLD AUTO: 14.4 % — SIGNIFICANT CHANGE UP (ref 13–44)
MCHC RBC-ENTMCNC: 29.7 PG — SIGNIFICANT CHANGE UP (ref 27–34)
MCHC RBC-ENTMCNC: 32.9 G/DL — SIGNIFICANT CHANGE UP (ref 32–36)
MCV RBC AUTO: 90.2 FL — SIGNIFICANT CHANGE UP (ref 80–100)
MONOCYTES # BLD AUTO: 0.67 K/UL — SIGNIFICANT CHANGE UP (ref 0–0.9)
MONOCYTES NFR BLD AUTO: 12.1 % — SIGNIFICANT CHANGE UP (ref 2–14)
NEUTROPHILS # BLD AUTO: 3.38 K/UL — SIGNIFICANT CHANGE UP (ref 1.8–7.4)
NEUTROPHILS NFR BLD AUTO: 60.9 % — SIGNIFICANT CHANGE UP (ref 43–77)
NRBC # BLD: 0 /100 WBCS — SIGNIFICANT CHANGE UP (ref 0–0)
PLATELET # BLD AUTO: 211 K/UL — SIGNIFICANT CHANGE UP (ref 150–400)
RBC # BLD: 3.17 M/UL — LOW (ref 4.2–5.8)
RBC # FLD: 15.5 % — HIGH (ref 10.3–14.5)
WBC # BLD: 5.55 K/UL — SIGNIFICANT CHANGE UP (ref 3.8–10.5)
WBC # FLD AUTO: 5.55 K/UL — SIGNIFICANT CHANGE UP (ref 3.8–10.5)

## 2021-02-26 PROCEDURE — 99072 ADDL SUPL MATRL&STAF TM PHE: CPT

## 2021-02-26 PROCEDURE — 99213 OFFICE O/P EST LOW 20 MIN: CPT

## 2021-02-26 NOTE — REVIEW OF SYSTEMS
[Constipation] : constipation [Negative] : Allergic/Immunologic [Fever] : no fever [Chills] : no chills [Fatigue] : no fatigue [Recent Change In Weight] : ~T no recent weight change [Abdominal Pain] : no abdominal pain [Vomiting] : no vomiting [Diarrhea] : no diarrhea [Joint Pain] : no joint pain [Joint Stiffness] : no joint stiffness [Muscle Pain] : no muscle pain [Muscle Weakness] : no muscle weakness [Skin Rash] : no skin rash [Skin Wound] : no skin wound [FreeTextEntry2] : weakness [FreeTextEntry7] : constipation on/off [FreeTextEntry9] : Swelling to left leg

## 2021-02-26 NOTE — ASSESSMENT
[Palliative] : Goals of care discussed with patient: Palliative [Palliative Care Plan] : not applicable at this time [FreeTextEntry1] : Patient is s/p C2D15 Gemzar/Abraxane. Will monitor for side effects/toxicities. Lab 2/24/2021 K=3.4. Advised pt to take KCL 20 meq BID x 3 days, then continue 1 tab daily. Ordered CT-Scans of abdomen/pelvis/chest  to evaluate for progression vs regression of disease, f/u. RX for constipation Miralax 1 packet in 8 ounce water daily. RTO 1 [facility]

## 2021-02-26 NOTE — PHYSICAL EXAM
[Ambulatory and capable of all self care but unable to carry out any work activities] : Status 2- Ambulatory and capable of all self care but unable to carry out any work activities. Up and about more than 50% of waking hours [Normal] : affect appropriate [de-identified] : + Mild discomfort to mid upper abdomen, +BS, soft, no masses [de-identified] : 1+swelling to lower extremities L>R

## 2021-02-26 NOTE — HISTORY OF PRESENT ILLNESS
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 His history dates back 6 weeks ago, December 2015, when he noted the onset of severe itching. He was seen in the emergency room on December 23 with jaundice and CAT scan revealed marked intrahepatic biliary ductal dilatation to the level of the biliary confluence. The CAT scan of the chest revealed a 6 mm linear opacity in the right upper lobe. MRI of the abdomen confirmed a biliary ductal dilatation to the level of the biliary hilum with an area of delayed enhancement measuring 3 x 1.9 cm. Thrombosis of the left portal vein was seen. 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 The patient underwent stent placement which improved his itching and jaundice. Cytology was obtained which was consistent with carcinoma.\par \par On January 5, 2016,  the patient underwent partial hepatectomy including the left lobe and  gallbladder, removal of the extrahepatic bile duct, hepaticojejunostomy to the Ju-en-Y, reconstruction of the portal vein with saphenous vein patch. The pathology revealed moderate to poorly differentiated cholangiocarcinoma involving the perihilar bile ducts. This tumor extended to the wall of the portal vein. The hepatic margin of resection was negative. 2 lymph nodes were negative. The bladder was nonmalignant. The tumor extended into the adjacent hepatic parenchyma, into the surrounding adipose tissue and extended into the interlobular bile ducts with extensive perineural invasion. The bile duct margins were negative. The tumor measured 2.9 x 2.4 cm. Staging was T3 N0.\par \par Patient did well postoperatively. Overall he lost about 20 pounds but now his appetite is improving and he feels stronger. His wound is healing well and he denies nausea, vomiting or diarrhea. There is no previous history of prior liver disease, gallbladder disease or GI disease. There is no family yesterday of malignancy. The patient works as a [employer] . [de-identified] : poorly diff cholangioca [FreeTextEntry1] : Gemzar/Abraxane [de-identified] : S/p C2D15 Gemzar/Abraxane. Patient had blood transfusion 1 week ago. Presently, he has less fatigue. He c/o some lower abdominal cramp and constipation on/off. Appetite is okay. He still has some swelling to lower extremities, but it is improved with HCTZ 25 mg daily.

## 2021-03-01 ENCOUNTER — RESULT REVIEW (OUTPATIENT)
Age: 66
End: 2021-03-01

## 2021-03-01 ENCOUNTER — APPOINTMENT (OUTPATIENT)
Dept: CT IMAGING | Facility: IMAGING CENTER | Age: 66
End: 2021-03-01
Payer: MEDICAID

## 2021-03-01 ENCOUNTER — OUTPATIENT (OUTPATIENT)
Dept: OUTPATIENT SERVICES | Facility: HOSPITAL | Age: 66
LOS: 1 days | End: 2021-03-01
Payer: MEDICAID

## 2021-03-01 DIAGNOSIS — C22.1 INTRAHEPATIC BILE DUCT CARCINOMA: ICD-10-CM

## 2021-03-01 DIAGNOSIS — Z98.890 OTHER SPECIFIED POSTPROCEDURAL STATES: Chronic | ICD-10-CM

## 2021-03-01 PROCEDURE — 71260 CT THORAX DX C+: CPT | Mod: 26

## 2021-03-01 PROCEDURE — 74177 CT ABD & PELVIS W/CONTRAST: CPT | Mod: 26

## 2021-03-01 PROCEDURE — 74177 CT ABD & PELVIS W/CONTRAST: CPT

## 2021-03-01 PROCEDURE — 71260 CT THORAX DX C+: CPT

## 2021-03-01 PROCEDURE — 82565 ASSAY OF CREATININE: CPT

## 2021-03-05 ENCOUNTER — APPOINTMENT (OUTPATIENT)
Dept: INFUSION THERAPY | Facility: HOSPITAL | Age: 66
End: 2021-03-05

## 2021-03-05 ENCOUNTER — LABORATORY RESULT (OUTPATIENT)
Age: 66
End: 2021-03-05

## 2021-03-05 LAB
ALBUMIN SERPL ELPH-MCNC: 3.7 G/DL
ALP BLD-CCNC: 101 U/L
ALT SERPL-CCNC: 27 U/L
ANION GAP SERPL CALC-SCNC: 14 MMOL/L
AST SERPL-CCNC: 32 U/L
BILIRUB SERPL-MCNC: 0.7 MG/DL
BUN SERPL-MCNC: 14 MG/DL
CALCIUM SERPL-MCNC: 8.7 MG/DL
CEA SERPL-MCNC: 6.3 NG/ML
CHLORIDE SERPL-SCNC: 104 MMOL/L
CO2 SERPL-SCNC: 24 MMOL/L
CREAT SERPL-MCNC: 0.96 MG/DL
GLUCOSE SERPL-MCNC: 138 MG/DL
MAGNESIUM SERPL-MCNC: 2 MG/DL
POTASSIUM SERPL-SCNC: 3.2 MMOL/L
PROT SERPL-MCNC: 6.8 G/DL
SODIUM SERPL-SCNC: 142 MMOL/L

## 2021-03-12 ENCOUNTER — APPOINTMENT (OUTPATIENT)
Dept: INFUSION THERAPY | Facility: HOSPITAL | Age: 66
End: 2021-03-12

## 2021-03-12 ENCOUNTER — LABORATORY RESULT (OUTPATIENT)
Age: 66
End: 2021-03-12

## 2021-03-16 ENCOUNTER — APPOINTMENT (OUTPATIENT)
Dept: RADIATION ONCOLOGY | Facility: CLINIC | Age: 66
End: 2021-03-16
Payer: MEDICAID

## 2021-03-16 PROCEDURE — 99214 OFFICE O/P EST MOD 30 MIN: CPT | Mod: 95

## 2021-03-16 NOTE — VITALS
[Maximal Pain Intensity: 7/10] : 7/10 [Least Pain Intensity: 5/10] : 5/10 [Pain Description/Quality: ___] : Pain description/quality: [unfilled] [Pain Duration: ___] : Pain duration: [unfilled] [Pain Location: ___] : Pain Location: [unfilled] [Pain Interferes with ADLs] : Pain interferes with activities of daily living. [Opioid] : opioid [70: Cares for self; unalbe to carry on normal activity or do active work.] : 70: Cares for self; unable to carry on normal activity or do active work. [ECOG Performance Status: 1 - Restricted in physically strenuous activity but ambulatory and able to carry out work of a light or sedentary nature] : Performance Status: 1 - Restricted in physically strenuous activity but ambulatory and able to carry out work of a light or sedentary nature, e.g., light house work, office work

## 2021-03-16 NOTE — REVIEW OF SYSTEMS
[Lower Ext Edema] : lower extremity edema [SOB on Exertion] : shortness of breath during exertion [Constipation] : constipation [Urinary Frequency] : urinary frequency [Negative] : Heme/Lymph [Constipation: Grade 1 - Occasional or intermittent symptoms; occasional use of stool softeners, laxatives, dietary modification, or enema] : Constipation: Grade 1 - Occasional or intermittent symptoms; occasional use of stool softeners, laxatives, dietary modification, or enema [Diarrhea: Grade 0] : Diarrhea: Grade 0 [Dysphagia: Grade 0] : Dysphagia: Grade 0 [Nausea: Grade 1 - Loss of appetite without alteration in eating habits] : Nausea: Grade 1 - Loss of appetite without alteration in eating habits [Vomiting: Grade 0] : Vomiting: Grade 0 [Cough: Grade 0] : Cough: Grade 0 [Dyspnea: Grade 1 - Shortness of breath with moderate exertion] : Dyspnea: Grade 1 - Shortness of breath with moderate exertion [Dermatitis Radiation: Grade 0] : Dermatitis Radiation: Grade 0

## 2021-03-17 ENCOUNTER — NON-APPOINTMENT (OUTPATIENT)
Age: 66
End: 2021-03-17

## 2021-03-17 ENCOUNTER — OUTPATIENT (OUTPATIENT)
Dept: OUTPATIENT SERVICES | Facility: HOSPITAL | Age: 66
LOS: 1 days | Discharge: HOME | End: 2021-03-17
Payer: MEDICAID

## 2021-03-17 DIAGNOSIS — Z98.890 OTHER SPECIFIED POSTPROCEDURAL STATES: Chronic | ICD-10-CM

## 2021-03-17 PROCEDURE — 99072 ADDL SUPL MATRL&STAF TM PHE: CPT

## 2021-03-17 PROCEDURE — 77263 THER RADIOLOGY TX PLNG CPLX: CPT

## 2021-03-21 ENCOUNTER — APPOINTMENT (OUTPATIENT)
Dept: DISASTER EMERGENCY | Facility: CLINIC | Age: 66
End: 2021-03-21

## 2021-03-22 LAB — SARS-COV-2 N GENE NPH QL NAA+PROBE: NOT DETECTED

## 2021-03-23 ENCOUNTER — OUTPATIENT (OUTPATIENT)
Dept: OUTPATIENT SERVICES | Facility: HOSPITAL | Age: 66
LOS: 1 days | Discharge: HOME | End: 2021-03-23

## 2021-03-23 DIAGNOSIS — Z98.890 OTHER SPECIFIED POSTPROCEDURAL STATES: Chronic | ICD-10-CM

## 2021-03-23 DIAGNOSIS — C22.1 INTRAHEPATIC BILE DUCT CARCINOMA: ICD-10-CM

## 2021-03-23 NOTE — HISTORY OF PRESENT ILLNESS
[Home] : at home, [unfilled] , at the time of the visit. [Medical Office: ([facility])___] : at the medical office located in  [Family Member] : family member [Verbal consent obtained from patient] : the patient, [unfilled] [FreeTextEntry4] : Donnell Myers RN [FreeTextEntry1] : Mr. Riddle is a 65 year old man with a history of cholangiocarcinoma diagnosed in 2015 s/p partial hepatectomy/cholecystectomy including the left lobe of the liver, gallbladder, extrahepatic bile duct, and hepaticojejunostomy to the Ju-en-Y with portal vein reconstruction by saphenous vein graft, with negative hepatic margin and lymph node sampling. Pathology revealed poorly differentiated cholangiocarcinoma extending to the wall of the portal vein. Surgery was followed by 6 cycles of systemic therapy in 2016. He was then followed on surveillance until recently 10/2020, CT showed reoccurrence of disease. Patient presents to discuss radiation therapy.\par \par Brief Oncological History:\par Previously noted history as follows:\par 4/22/20 - CT Abd demonstrated enlargement of a previously identified retroperitoneal soft tissue mass, now measuring 4.3x3.8cm with encasement of the right hepatic artery, right gastric artery, left renal vein/IVC junction, and reconstructed main portal vein, contiguous with the celiac axis, pancreatic neck, and efferent loop of the jejunostomy.\par \par 5/19/20 - CT guided biopsy was consistent with metastatic adenocarcinoma.  \par \par 06/11/2020 PET CT showed stable mass encasing the blood vessels adjacent to the liver. At that time patient and his family has requested that we delay treatment temporarily. \par \par 10/29/2020 - CT C/A/P showed retroperitoneal brian mass encasing the hepatic artery, slightly increased in size since 8/15/2020. A retrocaval lymph node has also slightly increased in size. Small volume ascites, slightly increased. Ill-defined low-attenuation is again noted along the liver resection margin. Small left supraclavicular lymph nodes. \par \par 12/03/2020 - Left SCV US guided FNA - negative for malignant cells. \par \par 12/17/2020 - Began C1 of Gemzar/ Abraxane. Thus far s/p 2 cycles.\par \par 03/01/2021 - CT C/A/P showing 5.0 x 4.1 cm soft tissue mass in the periportal region, encasing the celiac axis, and completely occluding the portal vein and inferior vena cava, not significantly changed in size, previously measuring 5.1 x 4.3 cm.\par \par Labs: CEA Downtrending - 2/26/2021 - 6.3 (9.7 pre chemotherapy)\par \par Patient today notes SOB on exertion but no cough at this time. Eating well. Has some constipation issues for which he takes Miralax and voiding frequently. Pain in abdomen and lower back 7/10 and goes down to 4-5 /10. Does not like to take pain medications but uses a heating pad with some relief.

## 2021-03-24 ENCOUNTER — APPOINTMENT (OUTPATIENT)
Dept: GASTROENTEROLOGY | Facility: HOSPITAL | Age: 66
End: 2021-03-24

## 2021-03-26 ENCOUNTER — RESULT REVIEW (OUTPATIENT)
Age: 66
End: 2021-03-26

## 2021-03-26 ENCOUNTER — APPOINTMENT (OUTPATIENT)
Dept: HEMATOLOGY ONCOLOGY | Facility: CLINIC | Age: 66
End: 2021-03-26
Payer: MEDICAID

## 2021-03-26 VITALS
DIASTOLIC BLOOD PRESSURE: 87 MMHG | OXYGEN SATURATION: 99 % | HEIGHT: 63.98 IN | TEMPERATURE: 98.2 F | WEIGHT: 159.17 LBS | RESPIRATION RATE: 16 BRPM | BODY MASS INDEX: 27.17 KG/M2 | HEART RATE: 73 BPM | SYSTOLIC BLOOD PRESSURE: 180 MMHG

## 2021-03-26 LAB
BASOPHILS # BLD AUTO: 0.03 K/UL — SIGNIFICANT CHANGE UP (ref 0–0.2)
BASOPHILS NFR BLD AUTO: 0.7 % — SIGNIFICANT CHANGE UP (ref 0–2)
EOSINOPHIL # BLD AUTO: 0.48 K/UL — SIGNIFICANT CHANGE UP (ref 0–0.5)
EOSINOPHIL NFR BLD AUTO: 11.5 % — HIGH (ref 0–6)
HCT VFR BLD CALC: 26.5 % — LOW (ref 39–50)
HGB BLD-MCNC: 8.7 G/DL — LOW (ref 13–17)
IMM GRANULOCYTES NFR BLD AUTO: 0.5 % — SIGNIFICANT CHANGE UP (ref 0–1.5)
LYMPHOCYTES # BLD AUTO: 0.73 K/UL — LOW (ref 1–3.3)
LYMPHOCYTES # BLD AUTO: 17.5 % — SIGNIFICANT CHANGE UP (ref 13–44)
MCHC RBC-ENTMCNC: 29.8 PG — SIGNIFICANT CHANGE UP (ref 27–34)
MCHC RBC-ENTMCNC: 32.8 G/DL — SIGNIFICANT CHANGE UP (ref 32–36)
MCV RBC AUTO: 90.8 FL — SIGNIFICANT CHANGE UP (ref 80–100)
MONOCYTES # BLD AUTO: 0.53 K/UL — SIGNIFICANT CHANGE UP (ref 0–0.9)
MONOCYTES NFR BLD AUTO: 12.7 % — SIGNIFICANT CHANGE UP (ref 2–14)
NEUTROPHILS # BLD AUTO: 2.38 K/UL — SIGNIFICANT CHANGE UP (ref 1.8–7.4)
NEUTROPHILS NFR BLD AUTO: 57.1 % — SIGNIFICANT CHANGE UP (ref 43–77)
NRBC # BLD: 0 /100 WBCS — SIGNIFICANT CHANGE UP (ref 0–0)
PLATELET # BLD AUTO: 65 K/UL — LOW (ref 150–400)
RBC # BLD: 2.92 M/UL — LOW (ref 4.2–5.8)
RBC # FLD: 15.9 % — HIGH (ref 10.3–14.5)
WBC # BLD: 4.17 K/UL — SIGNIFICANT CHANGE UP (ref 3.8–10.5)
WBC # FLD AUTO: 4.17 K/UL — SIGNIFICANT CHANGE UP (ref 3.8–10.5)

## 2021-03-26 PROCEDURE — 99072 ADDL SUPL MATRL&STAF TM PHE: CPT

## 2021-03-26 PROCEDURE — 99214 OFFICE O/P EST MOD 30 MIN: CPT

## 2021-03-28 LAB
ALBUMIN SERPL ELPH-MCNC: 4 G/DL
ALP BLD-CCNC: 94 U/L
ALT SERPL-CCNC: 17 U/L
ANION GAP SERPL CALC-SCNC: 11 MMOL/L
AST SERPL-CCNC: 32 U/L
BILIRUB SERPL-MCNC: 1 MG/DL
BUN SERPL-MCNC: 13 MG/DL
CALCIUM SERPL-MCNC: 9 MG/DL
CANCER AG19-9 SERPL-ACNC: 240 U/ML
CEA SERPL-MCNC: 6 NG/ML
CHLORIDE SERPL-SCNC: 102 MMOL/L
CO2 SERPL-SCNC: 26 MMOL/L
CREAT SERPL-MCNC: 0.78 MG/DL
GLUCOSE SERPL-MCNC: 92 MG/DL
POTASSIUM SERPL-SCNC: 3.6 MMOL/L
PROT SERPL-MCNC: 7 G/DL
SODIUM SERPL-SCNC: 139 MMOL/L

## 2021-03-28 NOTE — ASSESSMENT
[FreeTextEntry1] : Pt to be followed for sudden development of anterior mediastinal mass and dx of hematoma.  The patient will be evaluated in 2 weeks regarding his symptoms of shortness of breath and anterior chest pain from his new onset hematoma in the chest.  He is scheduled for radiation therapy to his brian mass in the abdomen status post chemotherapy.  He will repeat his CAT scan in 1 month to see that the hematoma is resolving.  He will return in 2 weeks or sooner as needed. [Palliative] : Goals of care discussed with patient: Palliative [Palliative Care Plan] : not applicable at this time

## 2021-03-28 NOTE — HISTORY OF PRESENT ILLNESS
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 His history dates back 6 weeks ago, December 2015, when he noted the onset of severe itching. He was seen in the emergency room on December 23 with jaundice and CAT scan revealed marked intrahepatic biliary ductal dilatation to the level of the biliary confluence. The CAT scan of the chest revealed a 6 mm linear opacity in the right upper lobe. MRI of the abdomen confirmed a biliary ductal dilatation to the level of the biliary hilum with an area of delayed enhancement measuring 3 x 1.9 cm. Thrombosis of the left portal vein was seen. 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 The patient underwent stent placement which improved his itching and jaundice. Cytology was obtained which was consistent with carcinoma.\par \par On January 5, 2016,  the patient underwent partial hepatectomy including the left lobe and  gallbladder, removal of the extrahepatic bile duct, hepaticojejunostomy to the Ju-en-Y, reconstruction of the portal vein with saphenous vein patch. The pathology revealed moderate to poorly differentiated cholangiocarcinoma involving the perihilar bile ducts. This tumor extended to the wall of the portal vein. The hepatic margin of resection was negative. 2 lymph nodes were negative. The bladder was nonmalignant. The tumor extended into the adjacent hepatic parenchyma, into the surrounding adipose tissue and extended into the interlobular bile ducts with extensive perineural invasion. The bile duct margins were negative. The tumor measured 2.9 x 2.4 cm. Staging was T3 N0.\par \par Patient did well postoperatively. Overall he lost about 20 pounds but now his appetite is improving and he feels stronger. His wound is healing well and he denies nausea, vomiting or diarrhea. There is no previous history of prior liver disease, gallbladder disease or GI disease. There is no family yesterday of malignancy. The patient works as a [employer] . [de-identified] : poorly diff cholangioca [de-identified] : Since the last visit the pt developed chest pain ands SOB and went to ER in [facility]. Ct chest CTA showed new 7 cm mass in anterior mediastinum.Pt was sent home and has been feeling better with less chest pain and dec SOB. Pt RTO to discuss results of scan.

## 2021-03-31 ENCOUNTER — APPOINTMENT (OUTPATIENT)
Dept: RADIATION ONCOLOGY | Facility: CLINIC | Age: 66
End: 2021-03-31

## 2021-04-22 DIAGNOSIS — R93.89 ABNORMAL FINDINGS ON DIAGNOSTIC IMAGING OF OTHER SPECIFIED BODY STRUCTURES: ICD-10-CM

## 2021-05-14 ENCOUNTER — APPOINTMENT (OUTPATIENT)
Dept: CT IMAGING | Facility: IMAGING CENTER | Age: 66
End: 2021-05-14
Payer: MEDICAID

## 2021-05-14 ENCOUNTER — OUTPATIENT (OUTPATIENT)
Dept: OUTPATIENT SERVICES | Facility: HOSPITAL | Age: 66
LOS: 1 days | End: 2021-05-14
Payer: MEDICAID

## 2021-05-14 DIAGNOSIS — Z98.890 OTHER SPECIFIED POSTPROCEDURAL STATES: Chronic | ICD-10-CM

## 2021-05-14 DIAGNOSIS — R93.89 ABNORMAL FINDINGS ON DIAGNOSTIC IMAGING OF OTHER SPECIFIED BODY STRUCTURES: ICD-10-CM

## 2021-05-14 PROCEDURE — 71250 CT THORAX DX C-: CPT | Mod: 26

## 2021-05-14 PROCEDURE — 71250 CT THORAX DX C-: CPT

## 2021-05-18 ENCOUNTER — NON-APPOINTMENT (OUTPATIENT)
Age: 66
End: 2021-05-18

## 2021-05-25 ENCOUNTER — APPOINTMENT (OUTPATIENT)
Dept: DISASTER EMERGENCY | Facility: CLINIC | Age: 66
End: 2021-05-25

## 2021-05-26 LAB — SARS-COV-2 N GENE NPH QL NAA+PROBE: NOT DETECTED

## 2021-05-27 ENCOUNTER — NON-APPOINTMENT (OUTPATIENT)
Age: 66
End: 2021-05-27

## 2021-07-06 ENCOUNTER — RX RENEWAL (OUTPATIENT)
Age: 66
End: 2021-07-06

## 2021-07-17 ENCOUNTER — APPOINTMENT (OUTPATIENT)
Dept: DISASTER EMERGENCY | Facility: CLINIC | Age: 66
End: 2021-07-17

## 2021-07-19 ENCOUNTER — NON-APPOINTMENT (OUTPATIENT)
Age: 66
End: 2021-07-19

## 2021-07-20 ENCOUNTER — APPOINTMENT (OUTPATIENT)
Dept: GASTROENTEROLOGY | Facility: HOSPITAL | Age: 66
End: 2021-07-20

## 2021-07-23 ENCOUNTER — RESULT REVIEW (OUTPATIENT)
Age: 66
End: 2021-07-23

## 2021-08-09 ENCOUNTER — APPOINTMENT (OUTPATIENT)
Dept: CT IMAGING | Facility: IMAGING CENTER | Age: 66
End: 2021-08-09
Payer: MEDICAID

## 2021-08-09 ENCOUNTER — OUTPATIENT (OUTPATIENT)
Dept: OUTPATIENT SERVICES | Facility: HOSPITAL | Age: 66
LOS: 1 days | End: 2021-08-09
Payer: MEDICAID

## 2021-08-09 DIAGNOSIS — C22.1 INTRAHEPATIC BILE DUCT CARCINOMA: ICD-10-CM

## 2021-08-09 DIAGNOSIS — Z98.890 OTHER SPECIFIED POSTPROCEDURAL STATES: Chronic | ICD-10-CM

## 2021-08-09 PROCEDURE — 74177 CT ABD & PELVIS W/CONTRAST: CPT | Mod: 26

## 2021-08-09 PROCEDURE — 71260 CT THORAX DX C+: CPT

## 2021-08-09 PROCEDURE — 74177 CT ABD & PELVIS W/CONTRAST: CPT

## 2021-08-09 PROCEDURE — 71260 CT THORAX DX C+: CPT | Mod: 26

## 2021-08-09 PROCEDURE — 82565 ASSAY OF CREATININE: CPT

## 2021-08-18 ENCOUNTER — NON-APPOINTMENT (OUTPATIENT)
Age: 66
End: 2021-08-18

## 2021-09-13 ENCOUNTER — APPOINTMENT (OUTPATIENT)
Dept: DISASTER EMERGENCY | Facility: CLINIC | Age: 66
End: 2021-09-13

## 2021-09-14 LAB — SARS-COV-2 N GENE NPH QL NAA+PROBE: NOT DETECTED

## 2021-09-15 RX ORDER — SODIUM CHLORIDE 9 MG/ML
500 INJECTION, SOLUTION INTRAVENOUS
Refills: 0 | Status: DISCONTINUED | OUTPATIENT
Start: 2021-09-16 | End: 2021-09-30

## 2021-09-16 ENCOUNTER — OUTPATIENT (OUTPATIENT)
Dept: OUTPATIENT SERVICES | Facility: HOSPITAL | Age: 66
LOS: 1 days | Discharge: HOME | End: 2021-09-16
Payer: MEDICAID

## 2021-09-16 ENCOUNTER — RESULT REVIEW (OUTPATIENT)
Age: 66
End: 2021-09-16

## 2021-09-16 ENCOUNTER — APPOINTMENT (OUTPATIENT)
Dept: GASTROENTEROLOGY | Facility: HOSPITAL | Age: 66
End: 2021-09-16

## 2021-09-16 VITALS
HEIGHT: 64 IN | TEMPERATURE: 98 F | SYSTOLIC BLOOD PRESSURE: 152 MMHG | HEART RATE: 62 BPM | OXYGEN SATURATION: 100 % | RESPIRATION RATE: 17 BRPM | WEIGHT: 147.93 LBS | DIASTOLIC BLOOD PRESSURE: 74 MMHG

## 2021-09-16 VITALS
OXYGEN SATURATION: 99 % | HEART RATE: 70 BPM | SYSTOLIC BLOOD PRESSURE: 146 MMHG | RESPIRATION RATE: 20 BRPM | DIASTOLIC BLOOD PRESSURE: 71 MMHG

## 2021-09-16 DIAGNOSIS — C22.1 INTRAHEPATIC BILE DUCT CARCINOMA: ICD-10-CM

## 2021-09-16 DIAGNOSIS — Z98.890 OTHER SPECIFIED POSTPROCEDURAL STATES: Chronic | ICD-10-CM

## 2021-09-16 PROCEDURE — 88342 IMHCHEM/IMCYTCHM 1ST ANTB: CPT | Mod: 26

## 2021-09-16 PROCEDURE — 43253 EGD US TRANSMURAL INJXN/MARK: CPT | Mod: GC

## 2021-09-16 PROCEDURE — 88341 IMHCHEM/IMCYTCHM EA ADD ANTB: CPT | Mod: 26

## 2021-09-16 PROCEDURE — 88305 TISSUE EXAM BY PATHOLOGIST: CPT | Mod: 26

## 2021-09-21 ENCOUNTER — NON-APPOINTMENT (OUTPATIENT)
Age: 66
End: 2021-09-21

## 2021-09-22 ENCOUNTER — NON-APPOINTMENT (OUTPATIENT)
Age: 66
End: 2021-09-22

## 2021-09-24 ENCOUNTER — OUTPATIENT (OUTPATIENT)
Dept: OUTPATIENT SERVICES | Facility: HOSPITAL | Age: 66
LOS: 1 days | Discharge: HOME | End: 2021-09-24

## 2021-09-24 DIAGNOSIS — Z98.890 OTHER SPECIFIED POSTPROCEDURAL STATES: Chronic | ICD-10-CM

## 2021-09-24 DIAGNOSIS — C22.1 INTRAHEPATIC BILE DUCT CARCINOMA: ICD-10-CM

## 2021-09-24 DIAGNOSIS — I82.409 ACUTE EMBOLISM AND THROMBOSIS OF UNSPECIFIED DEEP VEINS OF UNSPECIFIED LOWER EXTREMITY: ICD-10-CM

## 2021-09-27 ENCOUNTER — RESULT REVIEW (OUTPATIENT)
Age: 66
End: 2021-09-27

## 2021-09-27 ENCOUNTER — APPOINTMENT (OUTPATIENT)
Dept: HEMATOLOGY ONCOLOGY | Facility: CLINIC | Age: 66
End: 2021-09-27
Payer: MEDICAID

## 2021-09-27 VITALS
RESPIRATION RATE: 16 BRPM | DIASTOLIC BLOOD PRESSURE: 80 MMHG | TEMPERATURE: 98.2 F | HEART RATE: 72 BPM | SYSTOLIC BLOOD PRESSURE: 162 MMHG | OXYGEN SATURATION: 99 % | WEIGHT: 152.54 LBS | BODY MASS INDEX: 26.2 KG/M2

## 2021-09-27 DIAGNOSIS — Z79.899 OTHER LONG TERM (CURRENT) DRUG THERAPY: ICD-10-CM

## 2021-09-27 LAB
ALBUMIN SERPL ELPH-MCNC: 3.6 G/DL
ALP BLD-CCNC: 377 U/L
ALT SERPL-CCNC: 42 U/L
ANION GAP SERPL CALC-SCNC: 12 MMOL/L
AST SERPL-CCNC: 42 U/L
BASOPHILS # BLD AUTO: 0.02 K/UL — SIGNIFICANT CHANGE UP (ref 0–0.2)
BASOPHILS NFR BLD AUTO: 0.4 % — SIGNIFICANT CHANGE UP (ref 0–2)
BILIRUB SERPL-MCNC: 0.9 MG/DL
BUN SERPL-MCNC: 9 MG/DL
CALCIUM SERPL-MCNC: 8.8 MG/DL
CANCER AG19-9 SERPL-ACNC: 1884 U/ML
CEA SERPL-MCNC: 12.8 NG/ML
CHLORIDE SERPL-SCNC: 105 MMOL/L
CO2 SERPL-SCNC: 24 MMOL/L
CREAT SERPL-MCNC: 0.81 MG/DL
EOSINOPHIL # BLD AUTO: 0.29 K/UL — SIGNIFICANT CHANGE UP (ref 0–0.5)
EOSINOPHIL NFR BLD AUTO: 5.7 % — SIGNIFICANT CHANGE UP (ref 0–6)
GLUCOSE SERPL-MCNC: 106 MG/DL
HCT VFR BLD CALC: 29.8 % — LOW (ref 39–50)
HGB BLD-MCNC: 9.8 G/DL — LOW (ref 13–17)
IMM GRANULOCYTES NFR BLD AUTO: 0.4 % — SIGNIFICANT CHANGE UP (ref 0–1.5)
LYMPHOCYTES # BLD AUTO: 0.63 K/UL — LOW (ref 1–3.3)
LYMPHOCYTES # BLD AUTO: 12.4 % — LOW (ref 13–44)
MCHC RBC-ENTMCNC: 30.2 PG — SIGNIFICANT CHANGE UP (ref 27–34)
MCHC RBC-ENTMCNC: 32.9 G/DL — SIGNIFICANT CHANGE UP (ref 32–36)
MCV RBC AUTO: 91.7 FL — SIGNIFICANT CHANGE UP (ref 80–100)
MONOCYTES # BLD AUTO: 0.39 K/UL — SIGNIFICANT CHANGE UP (ref 0–0.9)
MONOCYTES NFR BLD AUTO: 7.7 % — SIGNIFICANT CHANGE UP (ref 2–14)
NEUTROPHILS # BLD AUTO: 3.73 K/UL — SIGNIFICANT CHANGE UP (ref 1.8–7.4)
NEUTROPHILS NFR BLD AUTO: 73.4 % — SIGNIFICANT CHANGE UP (ref 43–77)
NRBC # BLD: 0 /100 WBCS — SIGNIFICANT CHANGE UP (ref 0–0)
PLATELET # BLD AUTO: 142 K/UL — LOW (ref 150–400)
POTASSIUM SERPL-SCNC: 3.8 MMOL/L
PROT SERPL-MCNC: 6.8 G/DL
RBC # BLD: 3.25 M/UL — LOW (ref 4.2–5.8)
RBC # FLD: 14.7 % — HIGH (ref 10.3–14.5)
SODIUM SERPL-SCNC: 141 MMOL/L
WBC # BLD: 5.08 K/UL — SIGNIFICANT CHANGE UP (ref 3.8–10.5)
WBC # FLD AUTO: 5.08 K/UL — SIGNIFICANT CHANGE UP (ref 3.8–10.5)

## 2021-09-27 PROCEDURE — 99214 OFFICE O/P EST MOD 30 MIN: CPT

## 2021-09-27 NOTE — ASSESSMENT
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 Patient may be a candidate for RT. 2. Multiple Blood clots: Eliquis 5 mg bid. Will review CT-Scans of Abdomen/pelvis/chest from [facility]. Reviewed lab CBC, CMP, CEA, RTO in 2 week.

## 2021-09-27 NOTE — HISTORY OF PRESENT ILLNESS
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 His history dates back 6 weeks ago, December 2015, when he noted the onset of severe itching. He was seen in the emergency room on December 23 with jaundice and CAT scan revealed marked intrahepatic biliary ductal dilatation to the level of the biliary confluence. The CAT scan of the chest revealed a 6 mm linear opacity in the right upper lobe. MRI of the abdomen confirmed a biliary ductal dilatation to the level of the biliary hilum with an area of delayed enhancement measuring 3 x 1.9 cm. Thrombosis of the left portal vein was seen. 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 The patient underwent stent placement which improved his itching and jaundice. Cytology was obtained which was consistent with carcinoma.\par \par On January 5, 2016,  the patient underwent partial hepatectomy including the left lobe and  gallbladder, removal of the extrahepatic bile duct, hepaticojejunostomy to the Ju-en-Y, reconstruction of the portal vein with saphenous vein patch. The pathology revealed moderate to poorly differentiated cholangiocarcinoma involving the perihilar bile ducts. This tumor extended to the wall of the portal vein. The hepatic margin of resection was negative. 2 lymph nodes were negative. The bladder was nonmalignant. The tumor extended into the adjacent hepatic parenchyma, into the surrounding adipose tissue and extended into the interlobular bile ducts with extensive perineural invasion. The bile duct margins were negative. The tumor measured 2.9 x 2.4 cm. Staging was T3 N0.\par \par Patient did well postoperatively. Overall he lost about 20 pounds but now his appetite is improving and he feels stronger. His wound is healing well and he denies nausea, vomiting or diarrhea. There is no previous history of prior liver disease, gallbladder disease or GI disease. There is no family yesterday of malignancy. The patient works as a [employer] . [de-identified] : poorly diff cholangioca [de-identified] : Since the last visit the pt developed chest pain ands SOB and went to ER in [facility]. Ct chest CTA  showed new 7 cm mass in anterior mediastinum. Pt was sent home and has been feeling better with less chest pain and dec SOB. Patient has  come to discuss results of scan and biopsy report. Report showed periportal lymph node is + adenocarcinoma. Patient will receive SRBT. He was given a blood thinner for blood clot  Eliquis 5 mg bid.

## 2021-10-25 ENCOUNTER — OUTPATIENT (OUTPATIENT)
Dept: OUTPATIENT SERVICES | Facility: HOSPITAL | Age: 66
LOS: 1 days | Discharge: HOME | End: 2021-10-25

## 2021-10-25 DIAGNOSIS — Z98.890 OTHER SPECIFIED POSTPROCEDURAL STATES: Chronic | ICD-10-CM

## 2021-10-25 DIAGNOSIS — C22.1 INTRAHEPATIC BILE DUCT CARCINOMA: ICD-10-CM

## 2021-10-26 ENCOUNTER — RESULT REVIEW (OUTPATIENT)
Age: 66
End: 2021-10-26

## 2021-10-26 ENCOUNTER — APPOINTMENT (OUTPATIENT)
Dept: HEMATOLOGY ONCOLOGY | Facility: CLINIC | Age: 66
End: 2021-10-26
Payer: MEDICAID

## 2021-10-26 VITALS
DIASTOLIC BLOOD PRESSURE: 78 MMHG | HEART RATE: 72 BPM | SYSTOLIC BLOOD PRESSURE: 153 MMHG | BODY MASS INDEX: 28.79 KG/M2 | WEIGHT: 168.65 LBS | HEIGHT: 64 IN | OXYGEN SATURATION: 99 % | TEMPERATURE: 97.3 F | RESPIRATION RATE: 16 BRPM

## 2021-10-26 LAB
BASOPHILS # BLD AUTO: 0.04 K/UL — SIGNIFICANT CHANGE UP (ref 0–0.2)
BASOPHILS NFR BLD AUTO: 0.7 % — SIGNIFICANT CHANGE UP (ref 0–2)
EOSINOPHIL # BLD AUTO: 0.35 K/UL — SIGNIFICANT CHANGE UP (ref 0–0.5)
EOSINOPHIL NFR BLD AUTO: 6.2 % — HIGH (ref 0–6)
HCT VFR BLD CALC: 29.3 % — LOW (ref 39–50)
HGB BLD-MCNC: 9.7 G/DL — LOW (ref 13–17)
IMM GRANULOCYTES NFR BLD AUTO: 0.5 % — SIGNIFICANT CHANGE UP (ref 0–1.5)
LYMPHOCYTES # BLD AUTO: 0.71 K/UL — LOW (ref 1–3.3)
LYMPHOCYTES # BLD AUTO: 12.6 % — LOW (ref 13–44)
MCHC RBC-ENTMCNC: 29.6 PG — SIGNIFICANT CHANGE UP (ref 27–34)
MCHC RBC-ENTMCNC: 33.1 G/DL — SIGNIFICANT CHANGE UP (ref 32–36)
MCV RBC AUTO: 89.3 FL — SIGNIFICANT CHANGE UP (ref 80–100)
MONOCYTES # BLD AUTO: 0.61 K/UL — SIGNIFICANT CHANGE UP (ref 0–0.9)
MONOCYTES NFR BLD AUTO: 10.9 % — SIGNIFICANT CHANGE UP (ref 2–14)
NEUTROPHILS # BLD AUTO: 3.88 K/UL — SIGNIFICANT CHANGE UP (ref 1.8–7.4)
NEUTROPHILS NFR BLD AUTO: 69.1 % — SIGNIFICANT CHANGE UP (ref 43–77)
NRBC # BLD: 0 /100 WBCS — SIGNIFICANT CHANGE UP (ref 0–0)
PLATELET # BLD AUTO: 164 K/UL — SIGNIFICANT CHANGE UP (ref 150–400)
RBC # BLD: 3.28 M/UL — LOW (ref 4.2–5.8)
RBC # FLD: 14.8 % — HIGH (ref 10.3–14.5)
WBC # BLD: 5.62 K/UL — SIGNIFICANT CHANGE UP (ref 3.8–10.5)
WBC # FLD AUTO: 5.62 K/UL — SIGNIFICANT CHANGE UP (ref 3.8–10.5)

## 2021-10-26 PROCEDURE — 99213 OFFICE O/P EST LOW 20 MIN: CPT

## 2021-10-27 ENCOUNTER — OUTPATIENT (OUTPATIENT)
Dept: OUTPATIENT SERVICES | Facility: HOSPITAL | Age: 66
LOS: 1 days | End: 2021-10-27
Payer: MEDICAID

## 2021-10-27 ENCOUNTER — APPOINTMENT (OUTPATIENT)
Dept: ULTRASOUND IMAGING | Facility: IMAGING CENTER | Age: 66
End: 2021-10-27
Payer: MEDICAID

## 2021-10-27 DIAGNOSIS — C22.1 INTRAHEPATIC BILE DUCT CARCINOMA: ICD-10-CM

## 2021-10-27 DIAGNOSIS — Z98.890 OTHER SPECIFIED POSTPROCEDURAL STATES: Chronic | ICD-10-CM

## 2021-10-27 DIAGNOSIS — Z00.8 ENCOUNTER FOR OTHER GENERAL EXAMINATION: ICD-10-CM

## 2021-10-27 LAB
ALBUMIN SERPL ELPH-MCNC: 3.2 G/DL
ALP BLD-CCNC: 459 U/L
ALT SERPL-CCNC: 28 U/L
ANION GAP SERPL CALC-SCNC: 13 MMOL/L
APTT BLD: 39.2 SEC
AST SERPL-CCNC: 33 U/L
BILIRUB SERPL-MCNC: 1.1 MG/DL
BUN SERPL-MCNC: 12 MG/DL
CALCIUM SERPL-MCNC: 8.4 MG/DL
CANCER AG19-9 SERPL-ACNC: 1490 U/ML
CEA SERPL-MCNC: 10.8 NG/ML
CHLORIDE SERPL-SCNC: 98 MMOL/L
CO2 SERPL-SCNC: 24 MMOL/L
CREAT SERPL-MCNC: 0.74 MG/DL
GLUCOSE SERPL-MCNC: 148 MG/DL
INR PPP: 1.83 RATIO
POTASSIUM SERPL-SCNC: 3.4 MMOL/L
PROT SERPL-MCNC: 6.3 G/DL
PT BLD: 21 SEC
SODIUM SERPL-SCNC: 135 MMOL/L

## 2021-10-27 PROCEDURE — 76705 ECHO EXAM OF ABDOMEN: CPT

## 2021-10-27 PROCEDURE — 76705 ECHO EXAM OF ABDOMEN: CPT | Mod: 26

## 2021-10-27 NOTE — HISTORY OF PRESENT ILLNESS
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 His history dates back 6 weeks ago, December 2015, when he noted the onset of severe itching. He was seen in the emergency room on December 23 with jaundice and CAT scan revealed marked intrahepatic biliary ductal dilatation to the level of the biliary confluence. The CAT scan of the chest revealed a 6 mm linear opacity in the right upper lobe. MRI of the abdomen confirmed a biliary ductal dilatation to the level of the biliary hilum with an area of delayed enhancement measuring 3 x 1.9 cm. Thrombosis of the left portal vein was seen. 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 The patient underwent stent placement which improved his itching and jaundice. Cytology was obtained which was consistent with carcinoma.\par \par On January 5, 2016,  the patient underwent partial hepatectomy including the left lobe and  gallbladder, removal of the extrahepatic bile duct, hepaticojejunostomy to the Ju-en-Y, reconstruction of the portal vein with saphenous vein patch. The pathology revealed moderate to poorly differentiated cholangiocarcinoma involving the perihilar bile ducts. This tumor extended to the wall of the portal vein. The hepatic margin of resection was negative. 2 lymph nodes were negative. The bladder was nonmalignant. The tumor extended into the adjacent hepatic parenchyma, into the surrounding adipose tissue and extended into the interlobular bile ducts with extensive perineural invasion. The bile duct margins were negative. The tumor measured 2.9 x 2.4 cm. Staging was T3 N0.\par \par Patient did well postoperatively. Overall he lost about 20 pounds but now his appetite is improving and he feels stronger. His wound is healing well and he denies nausea, vomiting or diarrhea. There is no previous history of prior liver disease, gallbladder disease or GI disease. There is no family yesterday of malignancy. The patient works as a [employer] . [de-identified] : poorly diff cholangioca [de-identified] : Since the last visit the patient was scheduled to undergo radiation therapy to his metastatic abdominal brian mass.  He was scheduled to do fiducial placement and biopsy.  The biopsy came back consistent with metastatic adenocarcinoma from his cholangiocarcinoma.  The patient has not undergone radiation therapy because of progression of abdominal distention and discomfort making it difficult for him to walk and function at home.  He now returns for further management.

## 2021-10-27 NOTE — PHYSICAL EXAM
[Capable of only limited self care, confined to bed or chair more than 50% of waking hours] : Status 3- Capable of only limited self care, confined to bed or chair more than 50% of waking hours [Normal] : affect appropriate [de-identified] : Marked abdominal distention

## 2021-10-27 NOTE — ASSESSMENT
[FreeTextEntry1] : The patient will now be evaluated for his new symptoms of marked abdominal distention which suggest increasing ascites.  He will undergo sonogram for further evaluation and also he will be scheduled for paracentesis.  The other options for treating his tumor would be to restart systemic chemotherapy with Onivyde, 5-FU leucovorin and possibly adding immunotherapy to his program.  He may also benefit from radiation therapy if he can tolerate the side effects.  He will be encouraged to try to increase his caloric intake and we will also emphasized symptom control doing any further therapy.  He will return in 1 to 2 weeks. [Palliative] : Goals of care discussed with patient: Palliative [Palliative Care Plan] : not applicable at this time

## 2021-10-27 NOTE — REVIEW OF SYSTEMS
[Fatigue] : fatigue [Shortness Of Breath] : shortness of breath [Negative] : Allergic/Immunologic [FreeTextEntry7] : Decreased appetite from abdominal distention

## 2021-10-29 ENCOUNTER — RESULT REVIEW (OUTPATIENT)
Age: 66
End: 2021-10-29

## 2021-10-29 ENCOUNTER — OUTPATIENT (OUTPATIENT)
Dept: OUTPATIENT SERVICES | Facility: HOSPITAL | Age: 66
LOS: 1 days | End: 2021-10-29
Payer: MEDICAID

## 2021-10-29 ENCOUNTER — APPOINTMENT (OUTPATIENT)
Dept: ULTRASOUND IMAGING | Facility: IMAGING CENTER | Age: 66
End: 2021-10-29
Payer: MEDICAID

## 2021-10-29 DIAGNOSIS — C22.1 INTRAHEPATIC BILE DUCT CARCINOMA: ICD-10-CM

## 2021-10-29 DIAGNOSIS — Z98.890 OTHER SPECIFIED POSTPROCEDURAL STATES: Chronic | ICD-10-CM

## 2021-10-29 PROCEDURE — 49083 ABD PARACENTESIS W/IMAGING: CPT

## 2021-10-29 PROCEDURE — 88112 CYTOPATH CELL ENHANCE TECH: CPT

## 2021-10-29 PROCEDURE — 88112 CYTOPATH CELL ENHANCE TECH: CPT | Mod: 26

## 2021-10-29 PROCEDURE — 88305 TISSUE EXAM BY PATHOLOGIST: CPT | Mod: 26

## 2021-10-29 PROCEDURE — 88305 TISSUE EXAM BY PATHOLOGIST: CPT

## 2021-11-01 LAB — SARS-COV-2 N GENE NPH QL NAA+PROBE: NOT DETECTED

## 2021-11-11 ENCOUNTER — RESULT REVIEW (OUTPATIENT)
Age: 66
End: 2021-11-11

## 2021-11-11 ENCOUNTER — OUTPATIENT (OUTPATIENT)
Dept: OUTPATIENT SERVICES | Facility: HOSPITAL | Age: 66
LOS: 1 days | End: 2021-11-11
Payer: MEDICAID

## 2021-11-11 ENCOUNTER — APPOINTMENT (OUTPATIENT)
Dept: ULTRASOUND IMAGING | Facility: IMAGING CENTER | Age: 66
End: 2021-11-11
Payer: MEDICAID

## 2021-11-11 DIAGNOSIS — Z98.890 OTHER SPECIFIED POSTPROCEDURAL STATES: Chronic | ICD-10-CM

## 2021-11-11 DIAGNOSIS — C22.1 INTRAHEPATIC BILE DUCT CARCINOMA: ICD-10-CM

## 2021-11-11 PROCEDURE — 49083 ABD PARACENTESIS W/IMAGING: CPT

## 2021-11-14 LAB
APTT BLD: 36.1 SEC
BASOPHILS # BLD AUTO: 0.05 K/UL
BASOPHILS NFR BLD AUTO: 0.6 %
EOSINOPHIL # BLD AUTO: 0.32 K/UL
EOSINOPHIL NFR BLD AUTO: 3.8 %
HCT VFR BLD CALC: 30.5 %
HGB BLD-MCNC: 10.3 G/DL
IMM GRANULOCYTES NFR BLD AUTO: 0.4 %
INR PPP: 1.22 RATIO
LYMPHOCYTES # BLD AUTO: 0.66 K/UL
LYMPHOCYTES NFR BLD AUTO: 7.9 %
MAN DIFF?: NORMAL
MCHC RBC-ENTMCNC: 30 PG
MCHC RBC-ENTMCNC: 33.8 GM/DL
MCV RBC AUTO: 88.9 FL
MONOCYTES # BLD AUTO: 0.78 K/UL
MONOCYTES NFR BLD AUTO: 9.4 %
NEUTROPHILS # BLD AUTO: 6.5 K/UL
NEUTROPHILS NFR BLD AUTO: 77.9 %
PLATELET # BLD AUTO: 187 K/UL
PT BLD: 14.3 SEC
RBC # BLD: 3.43 M/UL
RBC # FLD: 15.3 %
SARS-COV-2 N GENE NPH QL NAA+PROBE: NOT DETECTED
WBC # FLD AUTO: 8.34 K/UL

## 2021-11-17 LAB
APTT BLD: 35.9 SEC
BASOPHILS # BLD AUTO: 0.03 K/UL
BASOPHILS NFR BLD AUTO: 0.4 %
EOSINOPHIL # BLD AUTO: 0.18 K/UL
EOSINOPHIL NFR BLD AUTO: 2.6 %
HCT VFR BLD CALC: 30 %
HGB BLD-MCNC: 9.8 G/DL
IMM GRANULOCYTES NFR BLD AUTO: 0.4 %
INR PPP: 1.44 RATIO
LYMPHOCYTES # BLD AUTO: 0.55 K/UL
LYMPHOCYTES NFR BLD AUTO: 8.1 %
MAN DIFF?: NORMAL
MCHC RBC-ENTMCNC: 28.9 PG
MCHC RBC-ENTMCNC: 32.7 GM/DL
MCV RBC AUTO: 88.5 FL
MONOCYTES # BLD AUTO: 0.77 K/UL
MONOCYTES NFR BLD AUTO: 11.3 %
NEUTROPHILS # BLD AUTO: 5.25 K/UL
NEUTROPHILS NFR BLD AUTO: 77.2 %
PLATELET # BLD AUTO: 237 K/UL
PT BLD: 16.7 SEC
RBC # BLD: 3.39 M/UL
RBC # FLD: 15.1 %
WBC # FLD AUTO: 6.81 K/UL

## 2021-11-19 ENCOUNTER — OUTPATIENT (OUTPATIENT)
Dept: OUTPATIENT SERVICES | Facility: HOSPITAL | Age: 66
LOS: 1 days | End: 2021-11-19
Payer: MEDICAID

## 2021-11-19 ENCOUNTER — RESULT REVIEW (OUTPATIENT)
Age: 66
End: 2021-11-19

## 2021-11-19 ENCOUNTER — APPOINTMENT (OUTPATIENT)
Dept: ULTRASOUND IMAGING | Facility: IMAGING CENTER | Age: 66
End: 2021-11-19
Payer: MEDICAID

## 2021-11-19 DIAGNOSIS — Z98.890 OTHER SPECIFIED POSTPROCEDURAL STATES: Chronic | ICD-10-CM

## 2021-11-19 DIAGNOSIS — C22.1 INTRAHEPATIC BILE DUCT CARCINOMA: ICD-10-CM

## 2021-11-19 DIAGNOSIS — Z00.8 ENCOUNTER FOR OTHER GENERAL EXAMINATION: ICD-10-CM

## 2021-11-19 PROCEDURE — 49083 ABD PARACENTESIS W/IMAGING: CPT

## 2021-11-22 LAB — SARS-COV-2 N GENE NPH QL NAA+PROBE: NOT DETECTED

## 2021-11-24 LAB
APTT BLD: 37.1 SEC
BASOPHILS # BLD AUTO: 0.04 K/UL
BASOPHILS NFR BLD AUTO: 0.4 %
EOSINOPHIL # BLD AUTO: 0.24 K/UL
EOSINOPHIL NFR BLD AUTO: 2.5 %
HCT VFR BLD CALC: 29.9 %
HGB BLD-MCNC: 9.7 G/DL
IMM GRANULOCYTES NFR BLD AUTO: 0.6 %
INR PPP: 1.6 RATIO
LYMPHOCYTES # BLD AUTO: 0.53 K/UL
LYMPHOCYTES NFR BLD AUTO: 5.6 %
MAN DIFF?: NORMAL
MCHC RBC-ENTMCNC: 28.6 PG
MCHC RBC-ENTMCNC: 32.4 GM/DL
MCV RBC AUTO: 88.2 FL
MONOCYTES # BLD AUTO: 0.9 K/UL
MONOCYTES NFR BLD AUTO: 9.5 %
NEUTROPHILS # BLD AUTO: 7.74 K/UL
NEUTROPHILS NFR BLD AUTO: 81.4 %
PLATELET # BLD AUTO: 238 K/UL
PT BLD: 18.5 SEC
RBC # BLD: 3.39 M/UL
RBC # FLD: 15 %
SARS-COV-2 N GENE NPH QL NAA+PROBE: NOT DETECTED
WBC # FLD AUTO: 9.51 K/UL

## 2021-11-26 ENCOUNTER — RESULT REVIEW (OUTPATIENT)
Age: 66
End: 2021-11-26

## 2021-11-26 ENCOUNTER — APPOINTMENT (OUTPATIENT)
Dept: ULTRASOUND IMAGING | Facility: IMAGING CENTER | Age: 66
End: 2021-11-26
Payer: MEDICAID

## 2021-11-26 ENCOUNTER — OUTPATIENT (OUTPATIENT)
Dept: OUTPATIENT SERVICES | Facility: HOSPITAL | Age: 66
LOS: 1 days | End: 2021-11-26
Payer: MEDICAID

## 2021-11-26 DIAGNOSIS — Z00.8 ENCOUNTER FOR OTHER GENERAL EXAMINATION: ICD-10-CM

## 2021-11-26 DIAGNOSIS — C22.1 INTRAHEPATIC BILE DUCT CARCINOMA: ICD-10-CM

## 2021-11-26 DIAGNOSIS — Z98.890 OTHER SPECIFIED POSTPROCEDURAL STATES: Chronic | ICD-10-CM

## 2021-11-26 PROCEDURE — 49083 ABD PARACENTESIS W/IMAGING: CPT

## 2021-12-03 ENCOUNTER — LABORATORY RESULT (OUTPATIENT)
Age: 66
End: 2021-12-03

## 2021-12-06 ENCOUNTER — RESULT REVIEW (OUTPATIENT)
Age: 66
End: 2021-12-06

## 2021-12-06 ENCOUNTER — OUTPATIENT (OUTPATIENT)
Dept: OUTPATIENT SERVICES | Facility: HOSPITAL | Age: 66
LOS: 1 days | End: 2021-12-06
Payer: MEDICAID

## 2021-12-06 ENCOUNTER — APPOINTMENT (OUTPATIENT)
Dept: ULTRASOUND IMAGING | Facility: IMAGING CENTER | Age: 66
End: 2021-12-06
Payer: MEDICAID

## 2021-12-06 DIAGNOSIS — Z00.8 ENCOUNTER FOR OTHER GENERAL EXAMINATION: ICD-10-CM

## 2021-12-06 DIAGNOSIS — Z98.890 OTHER SPECIFIED POSTPROCEDURAL STATES: Chronic | ICD-10-CM

## 2021-12-06 LAB
APTT BLD: 39.7 SEC
BASOPHILS # BLD AUTO: 0.03 K/UL
BASOPHILS NFR BLD AUTO: 0.2 %
EOSINOPHIL # BLD AUTO: 0.04 K/UL
EOSINOPHIL NFR BLD AUTO: 0.3 %
HCT VFR BLD CALC: 27.5 %
HGB BLD-MCNC: 9 G/DL
IMM GRANULOCYTES NFR BLD AUTO: 0.5 %
INR PPP: 2.06 RATIO
LYMPHOCYTES # BLD AUTO: 0.46 K/UL
LYMPHOCYTES NFR BLD AUTO: 3.7 %
MAN DIFF?: NORMAL
MCHC RBC-ENTMCNC: 28.4 PG
MCHC RBC-ENTMCNC: 32.7 GM/DL
MCV RBC AUTO: 86.8 FL
MONOCYTES # BLD AUTO: 0.76 K/UL
MONOCYTES NFR BLD AUTO: 6.1 %
NEUTROPHILS # BLD AUTO: 11.07 K/UL
NEUTROPHILS NFR BLD AUTO: 89.2 %
PLATELET # BLD AUTO: 189 K/UL
PT BLD: 23.5 SEC
RBC # BLD: 3.17 M/UL
RBC # FLD: 15.2 %
SARS-COV-2 N GENE NPH QL NAA+PROBE: NOT DETECTED
WBC # FLD AUTO: 12.42 K/UL

## 2021-12-06 PROCEDURE — 49083 ABD PARACENTESIS W/IMAGING: CPT

## 2021-12-09 ENCOUNTER — APPOINTMENT (OUTPATIENT)
Dept: HEMATOLOGY ONCOLOGY | Facility: CLINIC | Age: 66
End: 2021-12-09

## 2021-12-23 ENCOUNTER — RESULT REVIEW (OUTPATIENT)
Age: 66
End: 2021-12-23

## 2021-12-23 ENCOUNTER — OUTPATIENT (OUTPATIENT)
Dept: OUTPATIENT SERVICES | Facility: HOSPITAL | Age: 66
LOS: 1 days | End: 2021-12-23
Payer: MEDICAID

## 2021-12-23 ENCOUNTER — APPOINTMENT (OUTPATIENT)
Dept: ULTRASOUND IMAGING | Facility: IMAGING CENTER | Age: 66
End: 2021-12-23
Payer: MEDICAID

## 2021-12-23 DIAGNOSIS — C22.1 INTRAHEPATIC BILE DUCT CARCINOMA: ICD-10-CM

## 2021-12-23 DIAGNOSIS — Z98.890 OTHER SPECIFIED POSTPROCEDURAL STATES: Chronic | ICD-10-CM

## 2021-12-23 DIAGNOSIS — Z00.8 ENCOUNTER FOR OTHER GENERAL EXAMINATION: ICD-10-CM

## 2021-12-23 PROCEDURE — 49083 ABD PARACENTESIS W/IMAGING: CPT

## 2021-12-27 LAB
ALBUMIN SERPL ELPH-MCNC: 2.7 G/DL
ALBUMIN SERPL ELPH-MCNC: 2.8 G/DL
ALP BLD-CCNC: 774 U/L
ALP BLD-CCNC: 780 U/L
ALT SERPL-CCNC: 62 U/L
ALT SERPL-CCNC: 68 U/L
ANION GAP SERPL CALC-SCNC: 11 MMOL/L
ANION GAP SERPL CALC-SCNC: 9 MMOL/L
APTT BLD: 42.2 SEC
AST SERPL-CCNC: 48 U/L
AST SERPL-CCNC: 50 U/L
BASOPHILS # BLD AUTO: 0.04 K/UL
BASOPHILS NFR BLD AUTO: 0.5 %
BILIRUB SERPL-MCNC: 1.7 MG/DL
BILIRUB SERPL-MCNC: 1.7 MG/DL
BUN SERPL-MCNC: 39 MG/DL
BUN SERPL-MCNC: 40 MG/DL
CALCIUM SERPL-MCNC: 8.2 MG/DL
CALCIUM SERPL-MCNC: 8.2 MG/DL
CANCER AG19-9 SERPL-ACNC: 5680 U/ML
CEA SERPL-MCNC: 9.8 NG/ML
CHLORIDE SERPL-SCNC: 109 MMOL/L
CHLORIDE SERPL-SCNC: 109 MMOL/L
CO2 SERPL-SCNC: 16 MMOL/L
CO2 SERPL-SCNC: 16 MMOL/L
CREAT SERPL-MCNC: 0.93 MG/DL
CREAT SERPL-MCNC: 0.97 MG/DL
EOSINOPHIL # BLD AUTO: 0.16 K/UL
EOSINOPHIL NFR BLD AUTO: 2.2 %
GLUCOSE SERPL-MCNC: 103 MG/DL
GLUCOSE SERPL-MCNC: 108 MG/DL
HCT VFR BLD CALC: 26.2 %
HGB BLD-MCNC: 8.4 G/DL
IMM GRANULOCYTES NFR BLD AUTO: 1.2 %
INR PPP: 1.34 RATIO
LYMPHOCYTES # BLD AUTO: 0.8 K/UL
LYMPHOCYTES NFR BLD AUTO: 10.8 %
MAN DIFF?: NORMAL
MCHC RBC-ENTMCNC: 28.1 PG
MCHC RBC-ENTMCNC: 32.1 GM/DL
MCV RBC AUTO: 87.6 FL
MONOCYTES # BLD AUTO: 0.64 K/UL
MONOCYTES NFR BLD AUTO: 8.6 %
NEUTROPHILS # BLD AUTO: 5.71 K/UL
NEUTROPHILS NFR BLD AUTO: 76.7 %
PLATELET # BLD AUTO: 170 K/UL
POTASSIUM SERPL-SCNC: 5 MMOL/L
POTASSIUM SERPL-SCNC: 5.1 MMOL/L
PROT SERPL-MCNC: 6 G/DL
PROT SERPL-MCNC: 6.1 G/DL
PT BLD: 15.6 SEC
RBC # BLD: 2.99 M/UL
RBC # FLD: 19.5 %
SARS-COV-2 N GENE NPH QL NAA+PROBE: NOT DETECTED
SODIUM SERPL-SCNC: 134 MMOL/L
SODIUM SERPL-SCNC: 136 MMOL/L
WBC # FLD AUTO: 7.44 K/UL

## 2021-12-30 ENCOUNTER — RESULT REVIEW (OUTPATIENT)
Age: 66
End: 2021-12-30

## 2021-12-30 ENCOUNTER — APPOINTMENT (OUTPATIENT)
Dept: ULTRASOUND IMAGING | Facility: IMAGING CENTER | Age: 66
End: 2021-12-30

## 2021-12-30 ENCOUNTER — OUTPATIENT (OUTPATIENT)
Dept: OUTPATIENT SERVICES | Facility: HOSPITAL | Age: 66
LOS: 1 days | End: 2021-12-30
Payer: MEDICAID

## 2021-12-30 ENCOUNTER — APPOINTMENT (OUTPATIENT)
Dept: ULTRASOUND IMAGING | Facility: IMAGING CENTER | Age: 66
End: 2021-12-30
Payer: MEDICAID

## 2021-12-30 DIAGNOSIS — Z98.890 OTHER SPECIFIED POSTPROCEDURAL STATES: Chronic | ICD-10-CM

## 2021-12-30 DIAGNOSIS — C22.1 INTRAHEPATIC BILE DUCT CARCINOMA: ICD-10-CM

## 2021-12-30 PROCEDURE — 49083 ABD PARACENTESIS W/IMAGING: CPT

## 2022-01-03 LAB — SARS-COV-2 N GENE NPH QL NAA+PROBE: DETECTED

## 2022-01-04 ENCOUNTER — LABORATORY RESULT (OUTPATIENT)
Age: 67
End: 2022-01-04

## 2022-01-05 LAB
ALBUMIN SERPL ELPH-MCNC: 3.2 G/DL
ALP BLD-CCNC: 1172 U/L
ALT SERPL-CCNC: 51 U/L
ANION GAP SERPL CALC-SCNC: 12 MMOL/L
APTT BLD: 39.9 SEC
AST SERPL-CCNC: 60 U/L
BASOPHILS # BLD AUTO: 0.03 K/UL
BASOPHILS NFR BLD AUTO: 0.6 %
BILIRUB SERPL-MCNC: 1.3 MG/DL
BUN SERPL-MCNC: 26 MG/DL
CALCIUM SERPL-MCNC: 8 MG/DL
CANCER AG19-9 SERPL-ACNC: 4065 U/ML
CEA SERPL-MCNC: 13.7 NG/ML
CHLORIDE SERPL-SCNC: 107 MMOL/L
CO2 SERPL-SCNC: 13 MMOL/L
CREAT SERPL-MCNC: 1.02 MG/DL
EOSINOPHIL # BLD AUTO: 0.41 K/UL
EOSINOPHIL NFR BLD AUTO: 8 %
GLUCOSE SERPL-MCNC: 100 MG/DL
HCT VFR BLD CALC: 29.2 %
HGB BLD-MCNC: 9.2 G/DL
IMM GRANULOCYTES NFR BLD AUTO: 0.8 %
INR PPP: 1.21 RATIO
LYMPHOCYTES # BLD AUTO: 1.02 K/UL
LYMPHOCYTES NFR BLD AUTO: 19.9 %
MAN DIFF?: NORMAL
MCHC RBC-ENTMCNC: 28.8 PG
MCHC RBC-ENTMCNC: 31.5 GM/DL
MCV RBC AUTO: 91.3 FL
MONOCYTES # BLD AUTO: 0.41 K/UL
MONOCYTES NFR BLD AUTO: 8 %
NEUTROPHILS # BLD AUTO: 3.22 K/UL
NEUTROPHILS NFR BLD AUTO: 62.7 %
PLATELET # BLD AUTO: 136 K/UL
POTASSIUM SERPL-SCNC: 4.8 MMOL/L
PROT SERPL-MCNC: 6.8 G/DL
PT BLD: 14.1 SEC
RBC # BLD: 3.2 M/UL
RBC # FLD: 20.6 %
SODIUM SERPL-SCNC: 132 MMOL/L
WBC # FLD AUTO: 5.13 K/UL

## 2022-01-06 ENCOUNTER — APPOINTMENT (OUTPATIENT)
Dept: ULTRASOUND IMAGING | Facility: IMAGING CENTER | Age: 67
End: 2022-01-06
Payer: MEDICAID

## 2022-01-06 ENCOUNTER — OUTPATIENT (OUTPATIENT)
Dept: OUTPATIENT SERVICES | Facility: HOSPITAL | Age: 67
LOS: 1 days | End: 2022-01-06
Payer: MEDICAID

## 2022-01-06 ENCOUNTER — RESULT REVIEW (OUTPATIENT)
Age: 67
End: 2022-01-06

## 2022-01-06 DIAGNOSIS — Z00.8 ENCOUNTER FOR OTHER GENERAL EXAMINATION: ICD-10-CM

## 2022-01-06 DIAGNOSIS — C22.1 INTRAHEPATIC BILE DUCT CARCINOMA: ICD-10-CM

## 2022-01-06 DIAGNOSIS — Z98.890 OTHER SPECIFIED POSTPROCEDURAL STATES: Chronic | ICD-10-CM

## 2022-01-06 LAB — SARS-COV-2 N GENE NPH QL NAA+PROBE: DETECTED

## 2022-01-06 PROCEDURE — 49083 ABD PARACENTESIS W/IMAGING: CPT

## 2022-01-12 ENCOUNTER — OUTPATIENT (OUTPATIENT)
Dept: OUTPATIENT SERVICES | Facility: HOSPITAL | Age: 67
LOS: 1 days | Discharge: HOME | End: 2022-01-12

## 2022-01-12 DIAGNOSIS — C22.1 INTRAHEPATIC BILE DUCT CARCINOMA: ICD-10-CM

## 2022-01-12 DIAGNOSIS — Z98.890 OTHER SPECIFIED POSTPROCEDURAL STATES: Chronic | ICD-10-CM

## 2022-01-12 LAB — SARS-COV-2 N GENE NPH QL NAA+PROBE: DETECTED

## 2022-01-13 ENCOUNTER — APPOINTMENT (OUTPATIENT)
Dept: ULTRASOUND IMAGING | Facility: IMAGING CENTER | Age: 67
End: 2022-01-13
Payer: MEDICAID

## 2022-01-13 ENCOUNTER — RESULT REVIEW (OUTPATIENT)
Age: 67
End: 2022-01-13

## 2022-01-13 ENCOUNTER — APPOINTMENT (OUTPATIENT)
Dept: HEMATOLOGY ONCOLOGY | Facility: CLINIC | Age: 67
End: 2022-01-13
Payer: MEDICAID

## 2022-01-13 ENCOUNTER — OUTPATIENT (OUTPATIENT)
Dept: OUTPATIENT SERVICES | Facility: HOSPITAL | Age: 67
LOS: 1 days | End: 2022-01-13
Payer: MEDICAID

## 2022-01-13 DIAGNOSIS — C22.1 INTRAHEPATIC BILE DUCT CARCINOMA: ICD-10-CM

## 2022-01-13 DIAGNOSIS — Z98.890 OTHER SPECIFIED POSTPROCEDURAL STATES: Chronic | ICD-10-CM

## 2022-01-13 PROCEDURE — 99212 OFFICE O/P EST SF 10 MIN: CPT | Mod: 95

## 2022-01-13 PROCEDURE — 49083 ABD PARACENTESIS W/IMAGING: CPT

## 2022-01-16 ENCOUNTER — INPATIENT (INPATIENT)
Facility: HOSPITAL | Age: 67
LOS: 2 days | Discharge: HOME HEALTH | End: 2022-01-19
Attending: HOSPITALIST | Admitting: HOSPITALIST
Payer: MEDICAID

## 2022-01-16 VITALS
HEART RATE: 76 BPM | HEIGHT: 64 IN | DIASTOLIC BLOOD PRESSURE: 86 MMHG | RESPIRATION RATE: 20 BRPM | OXYGEN SATURATION: 99 % | TEMPERATURE: 98 F | SYSTOLIC BLOOD PRESSURE: 156 MMHG

## 2022-01-16 DIAGNOSIS — Z29.9 ENCOUNTER FOR PROPHYLACTIC MEASURES, UNSPECIFIED: ICD-10-CM

## 2022-01-16 DIAGNOSIS — R60.0 LOCALIZED EDEMA: ICD-10-CM

## 2022-01-16 DIAGNOSIS — R79.89 OTHER SPECIFIED ABNORMAL FINDINGS OF BLOOD CHEMISTRY: ICD-10-CM

## 2022-01-16 DIAGNOSIS — Z98.890 OTHER SPECIFIED POSTPROCEDURAL STATES: Chronic | ICD-10-CM

## 2022-01-16 DIAGNOSIS — C22.1 INTRAHEPATIC BILE DUCT CARCINOMA: ICD-10-CM

## 2022-01-16 DIAGNOSIS — R41.82 ALTERED MENTAL STATUS, UNSPECIFIED: ICD-10-CM

## 2022-01-16 DIAGNOSIS — R16.0 HEPATOMEGALY, NOT ELSEWHERE CLASSIFIED: ICD-10-CM

## 2022-01-16 DIAGNOSIS — U07.1 COVID-19: ICD-10-CM

## 2022-01-16 DIAGNOSIS — E72.20 DISORDER OF UREA CYCLE METABOLISM, UNSPECIFIED: ICD-10-CM

## 2022-01-16 DIAGNOSIS — R68.0 HYPOTHERMIA, NOT ASSOCIATED WITH LOW ENVIRONMENTAL TEMPERATURE: ICD-10-CM

## 2022-01-16 LAB
ALBUMIN FLD-MCNC: 0.5 G/DL — SIGNIFICANT CHANGE UP
ALBUMIN SERPL ELPH-MCNC: 3.4 G/DL — SIGNIFICANT CHANGE UP (ref 3.3–5)
ALBUMIN SERPL ELPH-MCNC: 3.6 G/DL — SIGNIFICANT CHANGE UP (ref 3.3–5)
ALP SERPL-CCNC: 934 U/L — HIGH (ref 40–120)
ALP SERPL-CCNC: 980 U/L — HIGH (ref 40–120)
ALT FLD-CCNC: 89 U/L — HIGH (ref 4–41)
ALT FLD-CCNC: 94 U/L — HIGH (ref 4–41)
AMMONIA BLD-MCNC: 36 UMOL/L — SIGNIFICANT CHANGE UP (ref 11–55)
AMMONIA BLD-MCNC: 80 UMOL/L — HIGH (ref 11–55)
ANION GAP SERPL CALC-SCNC: 13 MMOL/L — SIGNIFICANT CHANGE UP (ref 7–14)
ANION GAP SERPL CALC-SCNC: 13 MMOL/L — SIGNIFICANT CHANGE UP (ref 7–14)
APTT BLD: 38.8 SEC — HIGH (ref 27–36.3)
APTT BLD: 41 SEC — HIGH (ref 27–36.3)
AST SERPL-CCNC: 82 U/L — HIGH (ref 4–40)
AST SERPL-CCNC: 90 U/L — HIGH (ref 4–40)
B PERT DNA SPEC QL NAA+PROBE: SIGNIFICANT CHANGE UP
B PERT IGG+IGM PNL SER: ABNORMAL
B PERT+PARAPERT DNA PNL SPEC NAA+PROBE: SIGNIFICANT CHANGE UP
BASE EXCESS BLDV CALC-SCNC: -8.6 MMOL/L — LOW (ref -2–3)
BASOPHILS # BLD AUTO: 0 K/UL — SIGNIFICANT CHANGE UP (ref 0–0.2)
BASOPHILS # BLD AUTO: 0.02 K/UL — SIGNIFICANT CHANGE UP (ref 0–0.2)
BASOPHILS NFR BLD AUTO: 0 % — SIGNIFICANT CHANGE UP (ref 0–2)
BASOPHILS NFR BLD AUTO: 0.4 % — SIGNIFICANT CHANGE UP (ref 0–2)
BILIRUB SERPL-MCNC: 1.4 MG/DL — HIGH (ref 0.2–1.2)
BILIRUB SERPL-MCNC: 1.4 MG/DL — HIGH (ref 0.2–1.2)
BLOOD GAS VENOUS COMPREHENSIVE RESULT: SIGNIFICANT CHANGE UP
BLOOD GAS VENOUS COMPREHENSIVE RESULT: SIGNIFICANT CHANGE UP
BORDETELLA PARAPERTUSSIS (RAPRVP): SIGNIFICANT CHANGE UP
BUN SERPL-MCNC: 30 MG/DL — HIGH (ref 7–23)
BUN SERPL-MCNC: 32 MG/DL — HIGH (ref 7–23)
C PNEUM DNA SPEC QL NAA+PROBE: SIGNIFICANT CHANGE UP
CALCIUM SERPL-MCNC: 8.5 MG/DL — SIGNIFICANT CHANGE UP (ref 8.4–10.5)
CALCIUM SERPL-MCNC: 8.6 MG/DL — SIGNIFICANT CHANGE UP (ref 8.4–10.5)
CHLORIDE BLDV-SCNC: 108 MMOL/L — SIGNIFICANT CHANGE UP (ref 96–108)
CHLORIDE SERPL-SCNC: 102 MMOL/L — SIGNIFICANT CHANGE UP (ref 98–107)
CHLORIDE SERPL-SCNC: 107 MMOL/L — SIGNIFICANT CHANGE UP (ref 98–107)
CK SERPL-CCNC: 56 U/L — SIGNIFICANT CHANGE UP (ref 30–200)
CO2 BLDV-SCNC: 16.7 MMOL/L — LOW (ref 22–26)
CO2 SERPL-SCNC: 15 MMOL/L — LOW (ref 22–31)
CO2 SERPL-SCNC: 15 MMOL/L — LOW (ref 22–31)
COLOR FLD: YELLOW
CREAT SERPL-MCNC: 1.12 MG/DL — SIGNIFICANT CHANGE UP (ref 0.5–1.3)
CREAT SERPL-MCNC: 1.13 MG/DL — SIGNIFICANT CHANGE UP (ref 0.5–1.3)
EOSINOPHIL # BLD AUTO: 0.21 K/UL — SIGNIFICANT CHANGE UP (ref 0–0.5)
EOSINOPHIL # BLD AUTO: 0.23 K/UL — SIGNIFICANT CHANGE UP (ref 0–0.5)
EOSINOPHIL NFR BLD AUTO: 3 % — SIGNIFICANT CHANGE UP (ref 0–6)
EOSINOPHIL NFR BLD AUTO: 4.5 % — SIGNIFICANT CHANGE UP (ref 0–6)
FLUAV SUBTYP SPEC NAA+PROBE: SIGNIFICANT CHANGE UP
FLUBV RNA SPEC QL NAA+PROBE: SIGNIFICANT CHANGE UP
FLUID INTAKE SUBSTANCE CLASS: SIGNIFICANT CHANGE UP
FLUID SEGMENTED GRANULOCYTES: 8 % — SIGNIFICANT CHANGE UP
GAS PNL BLDV: 133 MMOL/L — LOW (ref 136–145)
GLUCOSE BLDV-MCNC: 73 MG/DL — SIGNIFICANT CHANGE UP (ref 70–99)
GLUCOSE FLD-MCNC: 125 MG/DL — SIGNIFICANT CHANGE UP
GLUCOSE SERPL-MCNC: 118 MG/DL — HIGH (ref 70–99)
GLUCOSE SERPL-MCNC: 86 MG/DL — SIGNIFICANT CHANGE UP (ref 70–99)
GRAM STN FLD: SIGNIFICANT CHANGE UP
HADV DNA SPEC QL NAA+PROBE: SIGNIFICANT CHANGE UP
HCO3 BLDV-SCNC: 16 MMOL/L — LOW (ref 22–29)
HCOV 229E RNA SPEC QL NAA+PROBE: SIGNIFICANT CHANGE UP
HCOV HKU1 RNA SPEC QL NAA+PROBE: SIGNIFICANT CHANGE UP
HCOV NL63 RNA SPEC QL NAA+PROBE: SIGNIFICANT CHANGE UP
HCOV OC43 RNA SPEC QL NAA+PROBE: SIGNIFICANT CHANGE UP
HCT VFR BLD CALC: 25.7 % — LOW (ref 39–50)
HCT VFR BLD CALC: 33.3 % — LOW (ref 39–50)
HCT VFR BLDA CALC: 27 % — LOW (ref 39–51)
HGB BLD CALC-MCNC: 9 G/DL — LOW (ref 13–17)
HGB BLD-MCNC: 10.5 G/DL — LOW (ref 13–17)
HGB BLD-MCNC: 8.6 G/DL — LOW (ref 13–17)
HMPV RNA SPEC QL NAA+PROBE: SIGNIFICANT CHANGE UP
HPIV1 RNA SPEC QL NAA+PROBE: SIGNIFICANT CHANGE UP
HPIV2 RNA SPEC QL NAA+PROBE: SIGNIFICANT CHANGE UP
HPIV3 RNA SPEC QL NAA+PROBE: SIGNIFICANT CHANGE UP
HPIV4 RNA SPEC QL NAA+PROBE: SIGNIFICANT CHANGE UP
IANC: 3.41 K/UL — SIGNIFICANT CHANGE UP (ref 1.5–8.5)
IANC: 4.81 K/UL — SIGNIFICANT CHANGE UP (ref 1.5–8.5)
IMM GRANULOCYTES NFR BLD AUTO: 0.6 % — SIGNIFICANT CHANGE UP (ref 0–1.5)
INR BLD: 1.33 RATIO — HIGH (ref 0.88–1.16)
INR BLD: 1.37 RATIO — HIGH (ref 0.88–1.16)
LACTATE BLDV-MCNC: 2.3 MMOL/L — HIGH (ref 0.5–2)
LDH SERPL L TO P-CCNC: 44 U/L — SIGNIFICANT CHANGE UP
LYMPHOCYTES # BLD AUTO: 0.88 K/UL — LOW (ref 1–3.3)
LYMPHOCYTES # BLD AUTO: 0.99 K/UL — LOW (ref 1–3.3)
LYMPHOCYTES # BLD AUTO: 14 % — SIGNIFICANT CHANGE UP (ref 13–44)
LYMPHOCYTES # BLD AUTO: 17.4 % — SIGNIFICANT CHANGE UP (ref 13–44)
LYMPHOCYTES # FLD: 11 % — SIGNIFICANT CHANGE UP
M PNEUMO DNA SPEC QL NAA+PROBE: SIGNIFICANT CHANGE UP
MAGNESIUM SERPL-MCNC: 2.2 MG/DL — SIGNIFICANT CHANGE UP (ref 1.6–2.6)
MCHC RBC-ENTMCNC: 28.9 PG — SIGNIFICANT CHANGE UP (ref 27–34)
MCHC RBC-ENTMCNC: 29.5 PG — SIGNIFICANT CHANGE UP (ref 27–34)
MCHC RBC-ENTMCNC: 31.5 GM/DL — LOW (ref 32–36)
MCHC RBC-ENTMCNC: 33.5 GM/DL — SIGNIFICANT CHANGE UP (ref 32–36)
MCV RBC AUTO: 86.2 FL — SIGNIFICANT CHANGE UP (ref 80–100)
MCV RBC AUTO: 93.5 FL — SIGNIFICANT CHANGE UP (ref 80–100)
MESOTHL CELL # FLD: 6 % — SIGNIFICANT CHANGE UP
MONOCYTES # BLD AUTO: 0.42 K/UL — SIGNIFICANT CHANGE UP (ref 0–0.9)
MONOCYTES # BLD AUTO: 0.5 K/UL — SIGNIFICANT CHANGE UP (ref 0–0.9)
MONOCYTES NFR BLD AUTO: 6 % — SIGNIFICANT CHANGE UP (ref 2–14)
MONOCYTES NFR BLD AUTO: 9.9 % — SIGNIFICANT CHANGE UP (ref 2–14)
MONOS+MACROS # FLD: 75 % — SIGNIFICANT CHANGE UP
NEUTROPHILS # BLD AUTO: 3.41 K/UL — SIGNIFICANT CHANGE UP (ref 1.8–7.4)
NEUTROPHILS # BLD AUTO: 5.44 K/UL — SIGNIFICANT CHANGE UP (ref 1.8–7.4)
NEUTROPHILS NFR BLD AUTO: 67.2 % — SIGNIFICANT CHANGE UP (ref 43–77)
NEUTROPHILS NFR BLD AUTO: 77 % — SIGNIFICANT CHANGE UP (ref 43–77)
NRBC # BLD: 0 /100 WBCS — SIGNIFICANT CHANGE UP
NRBC # FLD: 0 K/UL — SIGNIFICANT CHANGE UP
NT-PROBNP SERPL-SCNC: 1229 PG/ML — HIGH
PCO2 BLDV: 28 MMHG — LOW (ref 42–55)
PH BLDV: 7.36 — SIGNIFICANT CHANGE UP (ref 7.32–7.43)
PHOSPHATE SERPL-MCNC: 3.3 MG/DL — SIGNIFICANT CHANGE UP (ref 2.5–4.5)
PLATELET # BLD AUTO: 108 K/UL — LOW (ref 150–400)
PLATELET # BLD AUTO: 129 K/UL — LOW (ref 150–400)
PO2 BLDV: 30 MMHG — SIGNIFICANT CHANGE UP
POTASSIUM BLDV-SCNC: 4.2 MMOL/L — SIGNIFICANT CHANGE UP (ref 3.5–5.1)
POTASSIUM SERPL-MCNC: 4.4 MMOL/L — SIGNIFICANT CHANGE UP (ref 3.5–5.3)
POTASSIUM SERPL-MCNC: 5 MMOL/L — SIGNIFICANT CHANGE UP (ref 3.5–5.3)
POTASSIUM SERPL-SCNC: 4.4 MMOL/L — SIGNIFICANT CHANGE UP (ref 3.5–5.3)
POTASSIUM SERPL-SCNC: 5 MMOL/L — SIGNIFICANT CHANGE UP (ref 3.5–5.3)
PROCALCITONIN SERPL-MCNC: 0.14 NG/ML — HIGH (ref 0.02–0.1)
PROT FLD-MCNC: 1.2 G/DL — SIGNIFICANT CHANGE UP
PROT SERPL-MCNC: 6.2 G/DL — SIGNIFICANT CHANGE UP (ref 6–8.3)
PROT SERPL-MCNC: 7 G/DL — SIGNIFICANT CHANGE UP (ref 6–8.3)
PROTHROM AB SERPL-ACNC: 15 SEC — HIGH (ref 10.6–13.6)
PROTHROM AB SERPL-ACNC: 15.5 SEC — HIGH (ref 10.6–13.6)
RAPID RVP RESULT: DETECTED
RBC # BLD: 2.98 M/UL — LOW (ref 4.2–5.8)
RBC # BLD: 3.56 M/UL — LOW (ref 4.2–5.8)
RBC # FLD: 19.8 % — HIGH (ref 10.3–14.5)
RBC # FLD: 20.3 % — HIGH (ref 10.3–14.5)
RCV VOL RI: <1000 CELLS/UL — HIGH (ref 0–5)
RSV RNA SPEC QL NAA+PROBE: SIGNIFICANT CHANGE UP
RV+EV RNA SPEC QL NAA+PROBE: SIGNIFICANT CHANGE UP
SAO2 % BLDV: 43.7 % — SIGNIFICANT CHANGE UP
SARS-COV-2 RNA SPEC QL NAA+PROBE: DETECTED
SODIUM SERPL-SCNC: 130 MMOL/L — LOW (ref 135–145)
SODIUM SERPL-SCNC: 135 MMOL/L — SIGNIFICANT CHANGE UP (ref 135–145)
SPECIMEN SOURCE FLD: SIGNIFICANT CHANGE UP
SPECIMEN SOURCE: SIGNIFICANT CHANGE UP
TOTAL NUCLEATED CELL COUNT, BODY FLUID: 283 CELLS/UL — HIGH (ref 0–5)
TROPONIN T, HIGH SENSITIVITY RESULT: 23 NG/L — SIGNIFICANT CHANGE UP
TSH SERPL-MCNC: 4.34 UIU/ML — HIGH (ref 0.27–4.2)
TUBE TYPE: SIGNIFICANT CHANGE UP
WBC # BLD: 5.07 K/UL — SIGNIFICANT CHANGE UP (ref 3.8–10.5)
WBC # BLD: 7.07 K/UL — SIGNIFICANT CHANGE UP (ref 3.8–10.5)
WBC # FLD AUTO: 5.07 K/UL — SIGNIFICANT CHANGE UP (ref 3.8–10.5)
WBC # FLD AUTO: 7.07 K/UL — SIGNIFICANT CHANGE UP (ref 3.8–10.5)

## 2022-01-16 PROCEDURE — 71275 CT ANGIOGRAPHY CHEST: CPT | Mod: 26,MA

## 2022-01-16 PROCEDURE — 70450 CT HEAD/BRAIN W/O DYE: CPT | Mod: 26

## 2022-01-16 PROCEDURE — 99285 EMERGENCY DEPT VISIT HI MDM: CPT

## 2022-01-16 PROCEDURE — 74177 CT ABD & PELVIS W/CONTRAST: CPT | Mod: 26,MA

## 2022-01-16 PROCEDURE — 99221 1ST HOSP IP/OBS SF/LOW 40: CPT

## 2022-01-16 PROCEDURE — 99223 1ST HOSP IP/OBS HIGH 75: CPT

## 2022-01-16 PROCEDURE — 71045 X-RAY EXAM CHEST 1 VIEW: CPT | Mod: 26

## 2022-01-16 RX ORDER — ACETAMINOPHEN 500 MG
650 TABLET ORAL EVERY 6 HOURS
Refills: 0 | Status: DISCONTINUED | OUTPATIENT
Start: 2022-01-16 | End: 2022-01-19

## 2022-01-16 RX ORDER — METRONIDAZOLE 500 MG
TABLET ORAL
Refills: 0 | Status: DISCONTINUED | OUTPATIENT
Start: 2022-01-16 | End: 2022-01-16

## 2022-01-16 RX ORDER — LANOLIN ALCOHOL/MO/W.PET/CERES
3 CREAM (GRAM) TOPICAL AT BEDTIME
Refills: 0 | Status: DISCONTINUED | OUTPATIENT
Start: 2022-01-16 | End: 2022-01-19

## 2022-01-16 RX ORDER — ENOXAPARIN SODIUM 100 MG/ML
40 INJECTION SUBCUTANEOUS DAILY
Refills: 0 | Status: DISCONTINUED | OUTPATIENT
Start: 2022-01-16 | End: 2022-01-19

## 2022-01-16 RX ORDER — METRONIDAZOLE 500 MG
500 TABLET ORAL ONCE
Refills: 0 | Status: DISCONTINUED | OUTPATIENT
Start: 2022-01-16 | End: 2022-01-16

## 2022-01-16 RX ORDER — CEFTRIAXONE 500 MG/1
1000 INJECTION, POWDER, FOR SOLUTION INTRAMUSCULAR; INTRAVENOUS ONCE
Refills: 0 | Status: COMPLETED | OUTPATIENT
Start: 2022-01-16 | End: 2022-01-16

## 2022-01-16 RX ORDER — PIPERACILLIN AND TAZOBACTAM 4; .5 G/20ML; G/20ML
3.38 INJECTION, POWDER, LYOPHILIZED, FOR SOLUTION INTRAVENOUS EVERY 8 HOURS
Refills: 0 | Status: DISCONTINUED | OUTPATIENT
Start: 2022-01-16 | End: 2022-01-18

## 2022-01-16 RX ORDER — PANTOPRAZOLE SODIUM 20 MG/1
40 TABLET, DELAYED RELEASE ORAL DAILY
Refills: 0 | Status: DISCONTINUED | OUTPATIENT
Start: 2022-01-16 | End: 2022-01-19

## 2022-01-16 RX ORDER — SODIUM CHLORIDE 9 MG/ML
1000 INJECTION, SOLUTION INTRAVENOUS ONCE
Refills: 0 | Status: COMPLETED | OUTPATIENT
Start: 2022-01-16 | End: 2022-01-16

## 2022-01-16 RX ORDER — HALOPERIDOL DECANOATE 100 MG/ML
5 INJECTION INTRAMUSCULAR ONCE
Refills: 0 | Status: COMPLETED | OUTPATIENT
Start: 2022-01-16 | End: 2022-01-16

## 2022-01-16 RX ORDER — METRONIDAZOLE 500 MG
500 TABLET ORAL ONCE
Refills: 0 | Status: COMPLETED | OUTPATIENT
Start: 2022-01-16 | End: 2022-01-16

## 2022-01-16 RX ORDER — AMLODIPINE BESYLATE 2.5 MG/1
1 TABLET ORAL
Qty: 0 | Refills: 0 | DISCHARGE

## 2022-01-16 RX ORDER — HALOPERIDOL DECANOATE 100 MG/ML
2 INJECTION INTRAMUSCULAR EVERY 6 HOURS
Refills: 0 | Status: DISCONTINUED | OUTPATIENT
Start: 2022-01-16 | End: 2022-01-19

## 2022-01-16 RX ORDER — CIPROFLOXACIN LACTATE 400MG/40ML
VIAL (ML) INTRAVENOUS
Refills: 0 | Status: DISCONTINUED | OUTPATIENT
Start: 2022-01-16 | End: 2022-01-16

## 2022-01-16 RX ORDER — LACTULOSE 10 G/15ML
200 SOLUTION ORAL ONCE
Refills: 0 | Status: COMPLETED | OUTPATIENT
Start: 2022-01-16 | End: 2022-01-16

## 2022-01-16 RX ORDER — METRONIDAZOLE 500 MG
500 TABLET ORAL EVERY 8 HOURS
Refills: 0 | Status: DISCONTINUED | OUTPATIENT
Start: 2022-01-16 | End: 2022-01-16

## 2022-01-16 RX ORDER — INFLUENZA VIRUS VACCINE 15; 15; 15; 15 UG/.5ML; UG/.5ML; UG/.5ML; UG/.5ML
0.5 SUSPENSION INTRAMUSCULAR ONCE
Refills: 0 | Status: DISCONTINUED | OUTPATIENT
Start: 2022-01-16 | End: 2022-01-19

## 2022-01-16 RX ORDER — PANTOPRAZOLE SODIUM 20 MG/1
40 TABLET, DELAYED RELEASE ORAL
Refills: 0 | Status: DISCONTINUED | OUTPATIENT
Start: 2022-01-16 | End: 2022-01-16

## 2022-01-16 RX ORDER — PIPERACILLIN AND TAZOBACTAM 4; .5 G/20ML; G/20ML
3.38 INJECTION, POWDER, LYOPHILIZED, FOR SOLUTION INTRAVENOUS ONCE
Refills: 0 | Status: COMPLETED | OUTPATIENT
Start: 2022-01-16 | End: 2022-01-16

## 2022-01-16 RX ADMIN — Medication 2 MILLIGRAM(S): at 04:37

## 2022-01-16 RX ADMIN — SODIUM CHLORIDE 1000 MILLILITER(S): 9 INJECTION, SOLUTION INTRAVENOUS at 05:17

## 2022-01-16 RX ADMIN — HALOPERIDOL DECANOATE 5 MILLIGRAM(S): 100 INJECTION INTRAMUSCULAR at 04:08

## 2022-01-16 RX ADMIN — PIPERACILLIN AND TAZOBACTAM 25 GRAM(S): 4; .5 INJECTION, POWDER, LYOPHILIZED, FOR SOLUTION INTRAVENOUS at 18:10

## 2022-01-16 RX ADMIN — LACTULOSE 200 GRAM(S): 10 SOLUTION ORAL at 06:13

## 2022-01-16 RX ADMIN — CEFTRIAXONE 100 MILLIGRAM(S): 500 INJECTION, POWDER, FOR SOLUTION INTRAMUSCULAR; INTRAVENOUS at 04:27

## 2022-01-16 RX ADMIN — Medication 100 MILLIGRAM(S): at 07:04

## 2022-01-16 RX ADMIN — HALOPERIDOL DECANOATE 2 MILLIGRAM(S): 100 INJECTION INTRAMUSCULAR at 10:29

## 2022-01-16 RX ADMIN — ENOXAPARIN SODIUM 40 MILLIGRAM(S): 100 INJECTION SUBCUTANEOUS at 18:07

## 2022-01-16 RX ADMIN — PIPERACILLIN AND TAZOBACTAM 200 GRAM(S): 4; .5 INJECTION, POWDER, LYOPHILIZED, FOR SOLUTION INTRAVENOUS at 15:13

## 2022-01-16 RX ADMIN — PANTOPRAZOLE SODIUM 40 MILLIGRAM(S): 20 TABLET, DELAYED RELEASE ORAL at 18:07

## 2022-01-16 NOTE — CONSULT NOTE ADULT - SUBJECTIVE AND OBJECTIVE BOX
CHIEF COMPLAINT: AMS    HPI:  Mr. Riddle is a 66YOM with PMH of cholangiocarcinoma (s/p resection, chemo, RT, and bile duct/L liver lobe resection + Ju en Y Hepaticojejunostomy), HTN, HLD, BRIAN, and TB as a child who presents with SOB for 3 days and AMS for 1 day.  Found to be COVID positive but on RA. In the ED the patient was found to by hypothermic with an elevated ammonia level to 80 and was given lactulose enema. He was agitated and received 5mg haldol and 2mg ativan prior to CT imaging of his head and abdomen. Imaging was notable for lobular cystic collection on R hepatic lobe with further MRI imaging recommended. No PE was found on chest CT. He underwent 50cc paracentesis and 1L fluid bolus was given in addition to 1g ceftriaxone. Prior to medication administration pt. was complaining of SOB, abdominal pain, and had altered mental status.      PAST MEDICAL & SURGICAL HISTORY:  Liver mass    TB (tuberculosis)  age 17    HTN (hypertension)    Hypercholesterolemia    BRIAN (obstructive sleep apnea)    Cholangiocarcinoma  diagnosed 2015    Portal vein thrombosis  9/2021    History of abdominal surgery  h/o bile duct and left lobe of liver resected and Ju En Y hepaticojejunostomy    History of liver biopsy  9/2021        FAMILY HISTORY:  Family history of diabetes mellitus (Father)        SOCIAL HISTORY:  Smoking: __ packs x ___ years  EtOH Use:  Marital Status:  Occupation:  Recent Travel:  Country of Birth:  Advance Directives:    Allergies    No Known Allergies    Intolerances        HOME MEDICATIONS:    REVIEW OF SYSTEMS:  Constitutional:   Eyes:  ENT:  CV:  Resp:  GI:  :  MSK:  Integumentary:  Neurological:  Psychiatric:  Endocrine:  Hematologic/Lymphatic:  Allergic/Immunologic:  [ ] All other systems negative  [ ] Unable to assess ROS because ________    OBJECTIVE:  ICU Vital Signs Last 24 Hrs  T(C): 35.6 (16 Jan 2022 12:25), Max: 36.7 (16 Jan 2022 00:22)  T(F): 96 (16 Jan 2022 12:25), Max: 98 (16 Jan 2022 00:22)  HR: 89 (16 Jan 2022 12:25) (76 - 89)  BP: 145/91 (16 Jan 2022 12:25) (132/81 - 156/86)  BP(mean): 97 (16 Jan 2022 03:24) (97 - 97)  ABP: --  ABP(mean): --  RR: 18 (16 Jan 2022 12:25) (14 - 20)  SpO2: 100% (16 Jan 2022 12:25) (99% - 100%)        CAPILLARY BLOOD GLUCOSE      POCT Blood Glucose.: 88 mg/dL (16 Jan 2022 09:14)      PHYSICAL EXAM:  General:   HEENT:   Lymph Nodes:  Neck:   Respiratory:   Cardiovascular:   Abdomen:   Extremities:   Skin:   Neurological:  Psychiatry:    HOSPITAL MEDICATIONS:  MEDICATIONS  (STANDING):  enoxaparin Injectable 40 milliGRAM(s) SubCutaneous daily  pantoprazole    Tablet 40 milliGRAM(s) Oral before breakfast    MEDICATIONS  (PRN):  acetaminophen     Tablet .. 650 milliGRAM(s) Oral every 6 hours PRN Temp greater or equal to 38C (100.4F), Mild Pain (1 - 3)  haloperidol    Injectable 2 milliGRAM(s) IV Push every 6 hours PRN Agitation  melatonin 3 milliGRAM(s) Oral at bedtime PRN Insomnia      LABS:                        8.6    5.07  )-----------( 108      ( 16 Jan 2022 10:09 )             25.7     01-16    135  |  107  |  30<H>  ----------------------------<  86  4.4   |  15<L>  |  1.12    Ca    8.6      16 Jan 2022 10:09  Phos  3.3     01-16  Mg     2.20     01-16    TPro  6.2  /  Alb  3.4  /  TBili  1.4<H>  /  DBili  x   /  AST  90<H>  /  ALT  94<H>  /  AlkPhos  934<H>  01-16    PT/INR - ( 16 Jan 2022 10:09 )   PT: 15.5 sec;   INR: 1.37 ratio         PTT - ( 16 Jan 2022 10:09 )  PTT:41.0 sec      Venous Blood Gas:  01-16 @ 10:09  7.36/28/30/16/43.7  VBG Lactate: 2.3  Venous Blood Gas:  01-16 @ 02:30  7.33/29/31/15/41.6  VBG Lactate: 2.7      MICROBIOLOGY:     RADIOLOGY:  [ ] Reviewed and interpreted by me    EKG: CHIEF COMPLAINT: AMS    HPI:  Mr. Riddle is a 66YOM with PMH of cholangiocarcinoma (s/p resection, chemo, RT, and bile duct/L liver lobe resection + Ju en Y Hepaticojejunostomy), HTN, HLD, BRIAN, and TB as a child who presents with SOB for 3 days and AMS for 1 day.  Found to be COVID positive but satting well on RA. In the ED the patient was found to by hypothermic with an elevated ammonia level to 80 and was given lactulose enema. He was agitated and received 5mg haldol and 2mg ativan prior to CT imaging of his head and abdomen. Imaging was notable for lobular cystic collection on R hepatic lobe with further MRI imaging recommended. No PE was found on chest CT. He underwent diagnostic paracentesis, fluid studies c/w portal HTN. Started on ceftriaxone/flagyl and given 1L NS. On the floor a RRT was called for worsening mental status and obtundation. MICU consulted due to concern for airway protection and possible intubation.      PAST MEDICAL & SURGICAL HISTORY:  Liver mass    TB (tuberculosis)  age 17    HTN (hypertension)    Hypercholesterolemia    BRIAN (obstructive sleep apnea)    Cholangiocarcinoma  diagnosed 2015    Portal vein thrombosis  9/2021    History of abdominal surgery  h/o bile duct and left lobe of liver resected and Ju En Y hepaticojejunostomy    History of liver biopsy  9/2021        FAMILY HISTORY:  Family history of diabetes mellitus (Father)      OBJECTIVE:  ICU Vital Signs Last 24 Hrs  T(C): 35.6 (16 Jan 2022 12:25), Max: 36.7 (16 Jan 2022 00:22)  T(F): 96 (16 Jan 2022 12:25), Max: 98 (16 Jan 2022 00:22)  HR: 89 (16 Jan 2022 12:25) (76 - 89)  BP: 145/91 (16 Jan 2022 12:25) (132/81 - 156/86)  BP(mean): 97 (16 Jan 2022 03:24) (97 - 97)  ABP: --  ABP(mean): --  RR: 18 (16 Jan 2022 12:25) (14 - 20)  SpO2: 100% (16 Jan 2022 12:25) (99% - 100%)        CAPILLARY BLOOD GLUCOSE      POCT Blood Glucose.: 88 mg/dL (16 Jan 2022 09:14)      PHYSICAL EXAM:  GENERAL: NAD, AAOx0-1, responsive to verbal/painful stimuli  HEAD:  Atraumatic, normocephalic  ENT: Moist mucous membranes  NECK: Supple, no JVD  HEART: Regular rate and rhythm, no murmurs, rubs, or gallops  LUNGS: Unlabored respirations.  Clear to auscultation bilaterally, no crackles, wheezing, or rhonchi  ABDOMEN: Soft, nontender, mildly distended, +BS  EXTREMITIES: 2+ peripheral pulses bilaterally. No clubbing, cyanosis, or edema  NERVOUS SYSTEM:  A&Ox0-1  SKIN: No rashes or lesions      HOSPITAL MEDICATIONS:  MEDICATIONS  (STANDING):  enoxaparin Injectable 40 milliGRAM(s) SubCutaneous daily  pantoprazole    Tablet 40 milliGRAM(s) Oral before breakfast    MEDICATIONS  (PRN):  acetaminophen     Tablet .. 650 milliGRAM(s) Oral every 6 hours PRN Temp greater or equal to 38C (100.4F), Mild Pain (1 - 3)  haloperidol    Injectable 2 milliGRAM(s) IV Push every 6 hours PRN Agitation  melatonin 3 milliGRAM(s) Oral at bedtime PRN Insomnia      LABS:                        8.6    5.07  )-----------( 108      ( 16 Jan 2022 10:09 )             25.7     01-16    135  |  107  |  30<H>  ----------------------------<  86  4.4   |  15<L>  |  1.12    Ca    8.6      16 Jan 2022 10:09  Phos  3.3     01-16  Mg     2.20     01-16    TPro  6.2  /  Alb  3.4  /  TBili  1.4<H>  /  DBili  x   /  AST  90<H>  /  ALT  94<H>  /  AlkPhos  934<H>  01-16    PT/INR - ( 16 Jan 2022 10:09 )   PT: 15.5 sec;   INR: 1.37 ratio         PTT - ( 16 Jan 2022 10:09 )  PTT:41.0 sec      Venous Blood Gas:  01-16 @ 10:09  7.36/28/30/16/43.7  VBG Lactate: 2.3  Venous Blood Gas:  01-16 @ 02:30  7.33/29/31/15/41.6  VBG Lactate: 2.7      MICROBIOLOGY:     RADIOLOGY:  [ ] Reviewed and interpreted by me    EKG:

## 2022-01-16 NOTE — CONSULT NOTE ADULT - ASSESSMENT
Mr. Riddle is a 66YOM with PMH of cholangiocarcinoma (s/p resection, chemo, RT, and bile duct/L liver lobe resection + Ju en Y Hepaticojejunostomy), HTN, HLD, BRIAN, and TB as a child who presents with SOB for 3 days and AMS for 1 day, found to be COVID positive and w/  lobular cystic collection on R hepatic lobe c/f infection. MICU consulted for AMS and concern for airway protection    - Patient is satting well on RA, appears more arousable and is protecting his airway  - Would keep on continuous pulse ox  - Monitor mental status, treatment of COVID and possible liver abscess as per primary team  - No present indication for ICU-level care, please reconsult as needed    Discussed w/ MICU fellow and MAR during RRT    Andrew Pereira MD, PGY-3 Resident  Department of Internal Medicine  [id number]  Email: [email or web address]     Mr. Riddle is a 66YOM with PMH of cholangiocarcinoma (s/p resection, chemo, RT, and bile duct/L liver lobe resection + Ju en Y Hepaticojejunostomy), HTN, HLD, BRIAN, and TB as a child who presents with SOB for 3 days and AMS for 1 day, found to be COVID positive and w/  lobular cystic collection on R hepatic lobe c/f infection. MICU consulted for AMS and concern for airway protection    - Patient is satting well on RA, appears more arousable and is protecting his airway, VSS  - Would keep on continuous pulse ox  - Monitor mental status  - Treatment of COVID and possible liver abscess as per primary team  - No present indication for ICU-level care, please reconsult as needed    Discussed w/ MICU fellow and MAR during RRT    Andrew Pereira MD, PGY-3 Resident  Department of Internal Medicine  [id number]  Email: [email or web address]

## 2022-01-16 NOTE — RAPID RESPONSE TEAM SUMMARY - NSSITUATIONBACKGROUNDRRT_GEN_ALL_CORE
67y/o M w/ h/o cholangiocarcinoma s/p resection s/p chemo s/p RT p/w AMS and SOB. RRT called for lethargy and minimal unresponsiveness. On arrival, patient laying in bed, responsive only to noxious stimuli. Per chart review, patient underwent CT scan for AMS - unremarkable scan. Ammonia level elevated to 80, given lactulose enema. He was noted to have large volume ascites, tapped, no SBP. Also found to have cystic lobular collection in liver possibly abscess, given Flagyl and CTX. Blood cultures drawn. Was agitated, given haldol 5 and ativan 2. During assessment, SBP, heart rate, oxygen saturation stable, FS 88, but hypothermic to 95. Heating blanket ordered. AMS multifactorial, possibly medication induced (sedation) vs septic picture (in setting of possible liver abscess; also hypothermic) vs hyperammonemia vs ?seizure/postictal. Labs drawn, including repeat ammonia, VBG with lactate, CK/prolactin. Flumenzil 0.2 administered to potentially reverse effect of ativan; shortly after administration, patient became more awake but demonstrably agitated (likely reason he received Haldol and ativan in first place). Attempts were made to communicate with patient via ; he is aware of his name (his baseline on admission to the emergency department) but further attempts at communication were unsuccessful because he was using profanities and kept repeating that he wanted to leave the hospital. He was climbing out of bed so Haldol 2 PRN was ordered for agitation. Otherwise, patient hemodynamically stable. MICU consulted because of initial obtundation and concern for belly breathing/increased respiratory efforts, but the 67y/o M w/ h/o cholangiocarcinoma s/p resection s/p chemo s/p RT p/w AMS and SOB. RRT called for lethargy and minimal unresponsiveness. On arrival, patient laying in bed, responsive only to noxious stimuli. Per chart review, patient underwent CT scan for AMS - unremarkable scan. Ammonia level elevated to 80, given lactulose enema. He was noted to have large volume ascites, tapped, no SBP. Also found to have cystic lobular collection in liver possibly abscess, given Flagyl and CTX. Blood cultures drawn. Was agitated, given haldol 5 and ativan 2. During assessment, SBP, heart rate, oxygen saturation stable, FS 88, but hypothermic to 95. Heating blanket ordered. AMS multifactorial, possibly medication induced (sedation) vs septic picture (in setting of possible liver abscess; also hypothermic) vs hyperammonemia vs ?seizure/postictal. Labs drawn, including repeat ammonia, VBG with lactate, CK/prolactin. Flumenzil 0.2 administered to potentially reverse effect of ativan; shortly after administration, patient became more awake but demonstrably agitated (likely reason he received Haldol and ativan in first place). Attempts were made to communicate with patient via ; he is aware of his name (his baseline on admission to the emergency department) but further attempts at communication were unsuccessful because he was using profanities and kept repeating that he wanted to leave the hospital. He was climbing out of bed so Haldol 2 PRN was ordered for agitation. MICU consulted because of initial obtundation and concern for belly breathing/increased respiratory efforts, but patient markedly improved and was no longer belly breathing during the MICU resident's evaluation; therefore, no longer concerned for need for escalation of care. Otherwise, patient hemodynamically stable and more awake at conclusion of RRT.

## 2022-01-16 NOTE — H&P ADULT - PROBLEM SELECTOR PLAN 2
-pt. temperature 95.8 rectal, 96 on repeat.  -pt. not displaying other sepsis criteria - normal HR, nontachycardic, no leukocytosis.  -etiology possibly neurologic, consider MR head  -bear hugger administered  -Bcx drawn in ED, will f/u -pt. temperature 95.8 rectal, 96 on repeat.  -pt. not displaying other sepsis criteria - normal HR, nontachycardic, no leukocytosis.  -etiology possibly neurologic, consider MR head  -bear hugger administered  -Bcx drawn in ED, will f/u  -will give empiric zosyn at this time

## 2022-01-16 NOTE — ED PROVIDER NOTE - NSICDXPASTSURGICALHX_GEN_ALL_CORE_FT
PAST SURGICAL HISTORY:  History of abdominal surgery h/o bile duct and left lobe of liver resected and Ju En Y hepaticojejunostomy

## 2022-01-16 NOTE — ED PROVIDER NOTE - PHYSICAL EXAMINATION
Gen: NAD, non-toxic appearing  Head: normal appearing  HEENT: normal conjunctiva, oral mucosa moist  Lung: mild respiratory distress tachypneic, CTA b/l     CV: regular rate and rhythm, no murmurs  Abd: soft, distended, non tender   MSK: no visible deformities  Neuro: No focal deficits, AAOx2  Skin: Warm  Psych: normal affect Gen: NAD, non-toxic appearing  HEENT: NCAT, perrl, eomi, anicteric, normal conjunctiva, oral mucosa moist  Neck: supple no lad  Lung: mild respiratory distress tachypneic, CTA b/l     CV: regular rate and rhythm, no murmurs  Abd: soft, mildly distended, non tender, no acute skin changes  MSK: no visible deformities, FROM, non-tender, no midline or paraspinal spinal tenderness  Neuro: No focal deficits, AAOx2, motor and sensation grossly intact  Skin: Warm, no rash  Psych: normal affect

## 2022-01-16 NOTE — ED PROVIDER NOTE - OBJECTIVE STATEMENT
65y/o M w/ h/o cholangiocarcinoma s/p resection s/p chemo s/p RT p/w AMS and SOB. As per son pt has been getting q1w therapeutic paracentesis but missed the last 2 appointments. Has been feeling SOB for the past 3d went to urgent care and was told he might have blood clot. Today pt became more altered was eating napkins at dinner table and not acting like himself. No fevers, chills, nausea, vomiting, chest pain, cough, sob, abdominal pain, back pain, dysuria, numbness, tingling. 65y/o M w/ h/o cholangiocarcinoma s/p resection s/p chemo s/p RT p/w AMS and SOB. As per son pt has been getting q1w therapeutic paracentesis, last one 2d ago. Has been feeling SOB for the past 3d went to urgent care and was told he might have blood clot. Today pt became more altered was eating napkins at dinner table and not acting like himself. No fevers, chills, nausea, vomiting, chest pain, cough, sob, abdominal pain, back pain, dysuria, numbness, tingling.

## 2022-01-16 NOTE — H&P ADULT - NSICDXPASTSURGICALHX_GEN_ALL_CORE_FT
PAST SURGICAL HISTORY:  History of abdominal surgery h/o bile duct and left lobe of liver resected and Ju En Y hepaticojejunostomy    History of liver biopsy 9/2021

## 2022-01-16 NOTE — H&P ADULT - PROBLEM SELECTOR PLAN 6
-pt has known Hx cholangiocarcinoma s/p resection, chemo, RT  -will get f/u imaging to determine progression that would explain hyperammonemia and search for mets. -Ammonia level 80 on admission, decreased to 36 after admin of rectal lactulose  -will f/u mental status, hyperammonemia may have contributed to pt's symptoms prior to admission  -etiology likely 2/2 patient's cholangiocarcinoma.

## 2022-01-16 NOTE — H&P ADULT - HISTORY OF PRESENT ILLNESS
Mr. Riddle is a 66YOM with PMH of cholangiocarcinoma (s/p resection, chemo, RT, and bile duct/L liver lobe resection + Ju en Y Hepaticojejunostomy), HTN, HLD, BRIAN, and TB as a child who presents with SOB for 3 days and AMS for 1 day. Per the patient's daughter who is a nurse and HCP alongside her brother, the patient had recently been hospitalized at [facility] for sepsis from 12/7-12/21 and finished a course of IV ceftriaxone at home without complication. He was previously on eliquis that was held during that hospitalization. He had tested positive for COVID after his discharge but never had any symptoms or treatment and was A&Ox4 at baseline. On Thursday 1/13/21 pt. went for therapeutic paracentesis that was completed without incident. The following day, the patient was noted to have some difficulty breathing after eating dinner, thought to be due to GERD symptoms. His family gave him pantoprazole and his symptoms improved, however the following day 1/15, the patient reported not feeling like himself with continued difficulty breathing. He went to an urgent care center and was recommended to go to a hospital for workup of a possible PE, however pt. refused to go and went home instead. Later that evening he was found to be confused at dinner time where per family he was eating dinner but also ripping up his napkins and attempting to eat them. He was answering questions appropriately at the time and was still alert and oriented, however his family took him to the ED.     In the ED the patient was found to by hypothermic with an elevated ammonia level to 80 and was given lactulose enema. He was agitated and received 5mg haldol and 2mg ativan prior to CT imaging of his head and abdomen. Imaging was notable for lobular cystic collection on R hepatic lobe with further MRI imaging recommended. No PE was found on chest CT. He underwent 50cc paracentesis and 1L fluid bolus was given in addition to 1g ceftriaxone. Prior to medication administration pt. was complaining of SOB, abdominal pain, and had altered mental status.

## 2022-01-16 NOTE — ED PROVIDER NOTE - ADMIT DISPOSITION PRESENT ON ADMISSION SEPSIS
25yoF seen jazmine Aug for abdominal discomfort, generalized, associated with alternating stools however diarrhea more predominant for several years.  Averages 5-6  loose stools per day including nocturnal stools.  Moderate abdominal cramping and bloating. Sx worse after eating.  Denies any GI bleeding, weight loss, upper GI  symptoms. Tried probiotic without change.  Previous celiac testing was negative per patient EGD 9/9/2021 gastritis, bx + reflux esophagitis without HP or IM Colon same day erythema in TI and internal hemm, bx neg ileitis and colitis, Denies any family history of Crohn's or ulcerative colitis. No FH celiac or IBS. Very little dairy intake and tried cutting out without change. Given Xifaxin followed by visbiome and notes BMs now 2/day, formed and non-bloody. Bloating and cramping resolved. Saw ENT recently for post nasal drip-feels throat is inflammed and has throat clearing, worse after eating. No heartburn or regurg.
No

## 2022-01-16 NOTE — PATIENT PROFILE ADULT - FALL HARM RISK - HARM RISK INTERVENTIONS

## 2022-01-16 NOTE — H&P ADULT - NSHPLABSRESULTS_GEN_ALL_CORE
LABS:  01-16 @ 10:09 Creatine 56 U/L [30 - 200]                          8.6    5.07  )-----------( 108      ( 16 Jan 2022 10:09 )             25.7     01-16    135  |  107  |  30<H>  ----------------------------<  86  4.4   |  15<L>  |  1.12    Ca    8.6      16 Jan 2022 10:09  Phos  3.3     01-16  Mg     2.20     01-16    TPro  6.2  /  Alb  3.4  /  TBili  1.4<H>  /  DBili  x   /  AST  90<H>  /  ALT  94<H>  /  AlkPhos  934<H>  01-16    PT/INR - ( 16 Jan 2022 10:09 )   PT: 15.5 sec;   INR: 1.37 ratio         PTT - ( 16 Jan 2022 10:09 )  PTT:41.0 sec

## 2022-01-16 NOTE — ED PROVIDER NOTE - CLINICAL SUMMARY MEDICAL DECISION MAKING FREE TEXT BOX
65y/o M w/ h/o cholangiocarcinoma s/p resection s/p chemo s/p RT p/w AMS and SOB. DDx infectious (sbp,pna,cholangitis) vs vascular (pe) vs tumor (mets). plan cth, labs, cultures, abdominal paracentesis and admission.

## 2022-01-16 NOTE — ED ADULT NURSE REASSESSMENT NOTE - CONDITION
04;--- pt s/p sedation after being afitated at cart scan.  pt maintiaing airway.  given lactulaosde retentioj enema... did not hold most fluid but defecated mdoeate soft bm.  skin care given. ivf and flagyl given.  reprot given to lan arboleda.

## 2022-01-16 NOTE — H&P ADULT - ATTENDING COMMENTS
Mr. Riddle is a 66YOM with PMH of cholangiocarcinoma (s/p resection, chemo, RT, and bile duct/L liver lobe resection + Ju en Y Hepaticojejunostomy), HTN, HLD, BRIAN, and TB as a child who presents with SOB for 3 days and AMS for 1 day.     #Cholangiocarcinoma w/ Ascites     #Dyspnea    #Encephalopathy  - likely multifactorial in the setting    - MR head + MR abdomen   -     #New Lobular Cystic Collection and     s: New lobular cystic collection occupying large portion   of the right hepatic lobe with increase central biliary duct dilatation.   This could be due to infection/abscess, markedly distended intrahepatic   biliary radicals or cystic metastasis. Further evaluation with contrast   enhanced liver MRI is recommended.    Questionable cecal thickening for which may be   related to colitis. Mr. Riddle is a 66YOM with PMH of cholangiocarcinoma (s/p resection, chemo, RT, and bile duct/L liver lobe resection + Ju en Y Hepaticojejunostomy), HTN, HLD, BRIAN, and TB as a child who presents with SOB for 3 days and encephalopathy x 1 day. CT imaging w/ new lobular cystic collection occupying large portion of the right hepatic lobe with increase central biliary duct dilatation. Also marked distended intrahepatic biliary radicals or cystic metastasis. No PE noted. CT w/ mild pulmonary edema. Presentation now complicated by hypothermia as well. CT head unremarkable. Ammonia level elevated 80-> 36 s/p lactulose. Patient full code, medically active, w/ non-improving encephalopathy, prognosis guarded.     DDx for presentation includes progressive malignancy vs infectious (urinary tract infection (UA pending) vs liver abscess vs colitis (colonic thickening seen) vs COVID (less likely from COVID positive x 2-3 weeks per family)     #Cholangiocarcinoma w/ Ascites   - patient a Susanne patient, will notify onc of admission   - palliative consult this week if no improvement, full code   - ascites w/o SBP  - MR abdomen for further eval of new liver mass vs abscess     #Dyspnea  - likely related to ascites, and fluid overload (mild pulmonary edema on x-ray)   - on RA currently not complaining of shortness of breath and appears comfortable  - low threshold for diuretics if becomes symptomatic    #Encephalopathy  - likely multifactorial in the setting of elevated ammonia level, possible infection, decline in the setting of chronic comorbidities, and medications administered in emergency room for agitation   - MR head   - UA    #Hypothermia, R/o Infection   - continue with bear hugger   - f/u bcx, send UA   - TSH appreciated   - free T4, mildly elevated TSH likely not cause of hypothermia   - empirically cover w/ zosyn for intraabdominal pathology given CT findings (recent hospitalization w/ intravenous ceftriaxone administration)   - per chart review has had prolonged QT in the past will avoid fluoroquinolones in this setting     #Asymmetric Lower Extremity Edema  - doppler lower extremities     Medically Active Patient, Prognosis Guarded  Dispo TBD

## 2022-01-16 NOTE — CONSULT NOTE ADULT - SUBJECTIVE AND OBJECTIVE BOX
[facility] [place] [facility]   MD Aaron RUBY PA    TELEPHONE NUMBERS:  OFFICE: [phone]  DR MATT CELL: [phone]  SHAMA ZHOU CELL: [phone]      Date of serivice 01-16-22 @ 16:27  -- INITIAL RENAL CONSULT NOTE  --------------------------------------------------------------------------------  HPI:66YOM with PMH of cholangiocarcinoma (s/p resection, chemo, RT, and bile duct/L liver lobe resection + Ju en Y Hepaticojejunostomy), HTN, HLD, BRIAN, and TB as a child who admitted with SOB for 3 days and AMS for 1 day.  Found to be COVID positive but on RA.     PAST HISTORY  --------------------------------------------------------------------------------  PAST MEDICAL & SURGICAL HISTORY:  Liver mass    TB (tuberculosis)  age 17    HTN (hypertension)    Hypercholesterolemia    BRIAN (obstructive sleep apnea)    Cholangiocarcinoma  diagnosed 2015    Portal vein thrombosis  9/2021    History of abdominal surgery  h/o bile duct and left lobe of liver resected and Ju En Y hepaticojejunostomy    History of liver biopsy  9/2021      FAMILY HISTORY:  Family history of diabetes mellitus (Father)      PAST SOCIAL HISTORY:    ALLERGIES & MEDICATIONS  --------------------------------------------------------------------------------  Allergies    No Known Allergies    Intolerances      Standing Inpatient Medications  enoxaparin Injectable 40 milliGRAM(s) SubCutaneous daily  pantoprazole  Injectable 40 milliGRAM(s) IV Push daily  piperacillin/tazobactam IVPB.. 3.375 Gram(s) IV Intermittent every 8 hours    PRN Inpatient Medications  acetaminophen     Tablet .. 650 milliGRAM(s) Oral every 6 hours PRN  haloperidol    Injectable 2 milliGRAM(s) IV Push every 6 hours PRN  melatonin 3 milliGRAM(s) Oral at bedtime PRN      REVIEW OF SYSTEMS  --------------------------------------------------------------------------------  unable to obtain     All other systems were reviewed and are negative, except as noted.    VITALS/PHYSICAL EXAM  --------------------------------------------------------------------------------  T(C): 36.4 (01-16-22 @ 15:00), Max: 36.7 (01-16-22 @ 00:22)  HR: 88 (01-16-22 @ 15:00) (76 - 89)  BP: 129/68 (01-16-22 @ 15:00) (129/68 - 156/86)  RR: 18 (01-16-22 @ 15:00) (14 - 20)  SpO2: 100% (01-16-22 @ 15:00) (99% - 100%)  Wt(kg): --  Height (cm): 162.6 (01-16-22 @ 00:22)      Physical Exam:  	Gen: NAD  	HEENT: MMM  	Pulm: CTA B/L  	CV: S1S2  	Abd: Soft, +BS   	Ext: No LE edema B/L  	Neuro: Awake  	Skin: Warm and dry  	    LABS/STUDIES  --------------------------------------------------------------------------------              8.6    5.07  >-----------<  108      [01-16-22 @ 10:09]              25.7     135  |  107  |  30  ----------------------------<  86      [01-16-22 @ 10:09]  4.4   |  15  |  1.12        Ca     8.6     [01-16-22 @ 10:09]      Mg     2.20     [01-16-22 @ 10:09]      Phos  3.3     [01-16-22 @ 10:09]    TPro  6.2  /  Alb  3.4  /  TBili  1.4  /  DBili  x   /  AST  90  /  ALT  94  /  AlkPhos  934  [01-16-22 @ 10:09]    PT/INR: PT 15.5 , INR 1.37       [01-16-22 @ 10:09]  PTT: 41.0       [01-16-22 @ 10:09]    CK 56      [01-16-22 @ 10:09]    Creatinine Trend:  SCr 1.12 [01-16 @ 10:09]  SCr 1.13 [01-16 @ 02:30]        TSH 4.34      [01-16-22 @ 10:09]

## 2022-01-16 NOTE — H&P ADULT - NSHPSOCIALHISTORY_GEN_ALL_CORE
Lives with son and spouse, daughter visits every day.    Alcohol use : Denies  Smoking use: Denies  Drug use: Denies

## 2022-01-16 NOTE — ED PROVIDER NOTE - PROGRESS NOTE DETAILS
Clint, PGY3: labs showed ammonia could explain ams. pt got acutely agitated at ct scan throwing arms at son and attempting to throw himself off table requiring sedation 5haldol, 2ativan.

## 2022-01-16 NOTE — PROVIDER CONTACT NOTE (CHANGE IN STATUS NOTIFICATION) - BACKGROUND
pt admitted w/ disorder urea cycle metabolism, chest pain, SOB, ams; hx liver mass, portal vein HTN, & sepsis; s/p RRT @ 0915

## 2022-01-16 NOTE — H&P ADULT - PROBLEM SELECTOR PLAN 4
-lobular cystic collection found on R hepatic lobe with associated central biliary duct dilatation.  -possibly infection/abscess vs. intrahepatic biliary radicals vs. cystic mets  -will get MR abdomen for further assessment. -asymmetric edema noted  -will order venous duplex to r/o DVT

## 2022-01-16 NOTE — H&P ADULT - PROBLEM SELECTOR PLAN 1
-pt. with 1 day hx AMS prior to admission, was reportedly eating napkins at dinner per family  -Pt. altered on presentation to ED  -Hyperammonemia vs. COVID  -pt. received lactulose in ED  -pt received haldol and ativan prior to CT scan - patient asleep due to medications  -will reassess patient later this afternoon to further assess mental status.

## 2022-01-16 NOTE — PROVIDER CONTACT NOTE (CHANGE IN STATUS NOTIFICATION) - SITUATION
rectal temp 96.0 /91, other vss as per chart, bear hugger blanket warmer on patient
Patient arrived from ED A&Ox1, confused & lethargic; rectal temp 95.8, other vss as per chart

## 2022-01-16 NOTE — ED PROVIDER NOTE - ATTENDING CONTRIBUTION TO CARE
65 yo m past medical history cholangiocarcinoma (s/p ressection, chemo, preparing for RT) with ascitics requiring routine paracentesis( last 2 days ago), HTN, HLD presents for sob x 3 and acute AMS today. patient very limited historian and most of hx from son at bedside. no reported fever chills, ha, vision or hearing changes, no cp, abd pain n/v. son reports urinating normally and regular BM.  had recommended patient come to ED several days ago given report of SOB but patient wanted to wait per son. family became more concerned when patient began eating a napkin tonight. exam as above. Ddx includes, but not limited to, AMS 2/2 metabolic cause, infectious causes, worsening malignancy. no reported abd pain but given known ascitics will perform diagnostic para. plan: labs, CT, symptoms relief, admit 65 yo m past medical history cholangiocarcinoma (s/p ressection, chemo, preparing for RT) with ascitics requiring routine paracentesis( last 2 days ago), HTN, HLD presents for sob x 3 and acute AMS today. patient very limited historian and most of hx from son at bedside. no reported fever chills, ha, vision or hearing changes, no cp, abd pain n/v. son reports urinating normally and regular BM.  had recommended patient come to ED several days ago given report of SOB but patient wanted to wait per son. family became more concerned when patient began eating a napkin tonight. exam as above. Ddx includes, but not limited to, AMS 2/2 metabolic cause, infectious causes, worsening malignancy. no reported abd pain but given known ascitics will perform diagnostic paracentesis. plan: labs, CT, symptoms relief, admit

## 2022-01-16 NOTE — H&P ADULT - PROBLEM SELECTOR PLAN 7
Diet: pending bedside eval  DVT: Recommend lovenox 40 due to cancer  Dispo: pending PT. -pt has known Hx cholangiocarcinoma s/p resection, chemo, RT  -will get f/u imaging to determine progression that would explain hyperammonemia and search for mets.

## 2022-01-16 NOTE — ED ADULT NURSE NOTE - OBJECTIVE STATEMENT
received pt in room 22 with son, shamika [phone] at beside.  as per son, pt has lining and complicated hs.. was diagnosed with bile duct cancer several years ago... had relapse, started chemo but did not tolerate well.  had ir for markers for radiations but became septic and diagnosed with portal vein thrombosis.. was on Eliquis until a couple months ago.  as admitted for septic shock. dc'ed 3 weeks ago.  c/o sob and cp 2 days ago.. went to urgent care and told he needed to go to ed and have cat scan but refused further medical attention at the time.  yesterday became markedly confused, groaning and moaning.  20 gauge inserted right ac. 22 gauge inserted left ac.  blood sent as ordered. rvp sent.  placed on cardiac monitor.  being eval by attending md.

## 2022-01-16 NOTE — CONSULT NOTE ADULT - ASSESSMENT
6YOM with PMH of cholangiocarcinoma (s/p resection, chemo, RT, and bile duct/L liver lobe resection + Ju en Y Hepaticojejunostomy), HTN, HLD, BRIAN, and TB as a child who admitted with SOB for 3 days and AMS for 1 day.  Found to be COVID positive but on RA.     Hyponatremia   possibly hypervolemic in the setting of ascites vs. Hypovolemia  in the setting of poor oral intake. vs SIADH  Serum sodium improved today.  TSH slightly high.  Check aM cortisol   Check urine lytes.

## 2022-01-16 NOTE — H&P ADULT - PROBLEM SELECTOR PLAN 3
-per family, pt. has tested positive for COVID for 2-3 weeks as outpatient  -family reports pt. has been completely asymptomatic during that time period, did not receive any steroids or remdesevir.  -Pt. saturating well on RA, will monitor for increasing requirements.

## 2022-01-16 NOTE — H&P ADULT - PROBLEM SELECTOR PLAN 5
-Ammonia level 80 on admission, decreased to 36 after admin of rectal lactulose  -will f/u mental status, hyperammonemia may have contributed to pt's symptoms prior to admission  -etiology likely 2/2 patient's cholangiocarcinoma. -lobular cystic collection found on R hepatic lobe with associated central biliary duct dilatation.  -possibly infection/abscess vs. intrahepatic biliary radicals vs. cystic mets  -will get MR abdomen for further assessment.  -started zosyn

## 2022-01-16 NOTE — ED PROVIDER NOTE - NS ED ROS FT
GENERAL: no fever, no chills  EYES: no change in vision  HEENT: no trouble swallowing  CARDIAC: no chest pain, no palpitations  PULMONARY: no cough, +SOB  GI: +abdominal pain, no nausea, no vomiting, no diarrhea, no constipation  : no dysuria, no frequency, no change in appearance, no odor of urine  SKIN: no rashes  NEURO: +AMS; no headache, no weakness  MSK: no joint pain

## 2022-01-16 NOTE — ED ADULT NURSE NOTE - CHIEF COMPLAINT QUOTE
SOB, gets paracentesis 1x per week for bile duct CA. 100% on RA. c/o chest pain. covid positive 3 weeks ago but keeps testing positive. pt following commands, as per son whom is translating, patient is AMS. fs and ekg complete. no weakness/ facial droop.

## 2022-01-16 NOTE — ED PROVIDER NOTE - NSICDXPASTMEDICALHX_GEN_ALL_CORE_FT
PAST MEDICAL HISTORY:  Cholangiocarcinoma diagnosed 2015    HTN (hypertension)     Hypercholesterolemia     Liver mass     BRIAN (obstructive sleep apnea)     TB (tuberculosis) age 17

## 2022-01-16 NOTE — H&P ADULT - NSICDXPASTMEDICALHX_GEN_ALL_CORE_FT
PAST MEDICAL HISTORY:  Cholangiocarcinoma diagnosed 2015    HTN (hypertension)     Hypercholesterolemia     Liver mass     BRIAN (obstructive sleep apnea)     Portal vein thrombosis 9/2021    TB (tuberculosis) age 17

## 2022-01-16 NOTE — PROVIDER CONTACT NOTE (CHANGE IN STATUS NOTIFICATION) - ASSESSMENT
A&Ox1, confused & lethargic; unable to verbalized needs; skin cool upon palpation A&Ox1, confused & lethargic; unable to verbalized needs; skin cool upon palpation; belly breathing

## 2022-01-16 NOTE — ED ADULT TRIAGE NOTE - CHIEF COMPLAINT QUOTE
SOB, gets paracentesis 1x per week for bile duct CA. 100% on RA. c/o chest pain. covid positive 3 weeks ago but keeps testing positive. pt not following commands, as per son whom is translating, patient is AMS. fs and ekg complete. no weakness/ facial droop. SOB, gets paracentesis 1x per week for bile duct CA. 100% on RA. c/o chest pain. covid positive 3 weeks ago but keeps testing positive. pt following commands, as per son whom is translating, patient is AMS. fs and ekg complete. no weakness/ facial droop.

## 2022-01-16 NOTE — H&P ADULT - ASSESSMENT
Mr. Riddle is a 66YOM with PMH of cholangiocarcinoma (s/p resection, chemo, RT, and bile duct/L liver lobe resection + Ju en Y Hepaticojejunostomy), HTN, HLD, BRIAN, and TB as a child who presents with SOB for 3 days and AMS for 1 day.

## 2022-01-16 NOTE — H&P ADULT - NSHPPHYSICALEXAM_GEN_ALL_CORE
GENERAL: NAD, lying in bed comfortably sleeping with hypothermia blanket, mildly responsive to painful stimuli  HEAD:  Atraumatic, normocephalic  ENT: Moist mucous membranes  NECK: Supple, no JVD  HEART: Regular rate and rhythm, no murmurs, rubs, or gallops  LUNGS: Unlabored respirations.  Clear to auscultation bilaterally, no crackles, wheezing, or rhonchi  ABDOMEN: Soft, nontender, mildly distended, +BS  EXTREMITIES: 2+ peripheral pulses bilaterally. No clubbing, cyanosis, or edema  NERVOUS SYSTEM:  A&Ox0,  SKIN: No rashes or lesions

## 2022-01-16 NOTE — ED PROCEDURE NOTE - CPROC ED INDICATIONS1
ascites/suspected spontaneous bacterial peritonitis diagnostic paracentesis./ascites/suspected spontaneous bacterial peritonitis

## 2022-01-17 LAB
ALBUMIN SERPL ELPH-MCNC: 2.8 G/DL — LOW (ref 3.3–5)
ALP SERPL-CCNC: 756 U/L — HIGH (ref 40–120)
ALT FLD-CCNC: 64 U/L — HIGH (ref 4–41)
ANION GAP SERPL CALC-SCNC: 12 MMOL/L — SIGNIFICANT CHANGE UP (ref 7–14)
AST SERPL-CCNC: 55 U/L — HIGH (ref 4–40)
BILIRUB SERPL-MCNC: 1.6 MG/DL — HIGH (ref 0.2–1.2)
BUN SERPL-MCNC: 31 MG/DL — HIGH (ref 7–23)
CALCIUM SERPL-MCNC: 8.3 MG/DL — LOW (ref 8.4–10.5)
CHLORIDE SERPL-SCNC: 110 MMOL/L — HIGH (ref 98–107)
CO2 SERPL-SCNC: 14 MMOL/L — LOW (ref 22–31)
CREAT SERPL-MCNC: 1.19 MG/DL — SIGNIFICANT CHANGE UP (ref 0.5–1.3)
GLUCOSE SERPL-MCNC: 69 MG/DL — LOW (ref 70–99)
HCT VFR BLD CALC: 23.2 % — LOW (ref 39–50)
HCV AB S/CO SERPL IA: 0.35 S/CO — SIGNIFICANT CHANGE UP (ref 0–0.99)
HCV AB SERPL-IMP: SIGNIFICANT CHANGE UP
HGB BLD-MCNC: 8 G/DL — LOW (ref 13–17)
MAGNESIUM SERPL-MCNC: 2.1 MG/DL — SIGNIFICANT CHANGE UP (ref 1.6–2.6)
MCHC RBC-ENTMCNC: 29.5 PG — SIGNIFICANT CHANGE UP (ref 27–34)
MCHC RBC-ENTMCNC: 34.5 GM/DL — SIGNIFICANT CHANGE UP (ref 32–36)
MCV RBC AUTO: 85.6 FL — SIGNIFICANT CHANGE UP (ref 80–100)
NRBC # BLD: 0 /100 WBCS — SIGNIFICANT CHANGE UP
NRBC # FLD: 0 K/UL — SIGNIFICANT CHANGE UP
OSMOLALITY UR: 538 MOSM/KG — SIGNIFICANT CHANGE UP (ref 50–1200)
PHOSPHATE SERPL-MCNC: 3.9 MG/DL — SIGNIFICANT CHANGE UP (ref 2.5–4.5)
PLATELET # BLD AUTO: 107 K/UL — LOW (ref 150–400)
POTASSIUM SERPL-MCNC: 4.3 MMOL/L — SIGNIFICANT CHANGE UP (ref 3.5–5.3)
POTASSIUM SERPL-SCNC: 4.3 MMOL/L — SIGNIFICANT CHANGE UP (ref 3.5–5.3)
PROT SERPL-MCNC: 5.6 G/DL — LOW (ref 6–8.3)
RBC # BLD: 2.71 M/UL — LOW (ref 4.2–5.8)
RBC # FLD: 20.1 % — HIGH (ref 10.3–14.5)
SODIUM SERPL-SCNC: 136 MMOL/L — SIGNIFICANT CHANGE UP (ref 135–145)
T4 FREE SERPL-MCNC: 1.2 NG/DL — SIGNIFICANT CHANGE UP (ref 0.9–1.8)
WBC # BLD: 6.54 K/UL — SIGNIFICANT CHANGE UP (ref 3.8–10.5)
WBC # FLD AUTO: 6.54 K/UL — SIGNIFICANT CHANGE UP (ref 3.8–10.5)

## 2022-01-17 PROCEDURE — 99233 SBSQ HOSP IP/OBS HIGH 50: CPT | Mod: GC

## 2022-01-17 PROCEDURE — 99223 1ST HOSP IP/OBS HIGH 75: CPT | Mod: GC

## 2022-01-17 PROCEDURE — 76942 ECHO GUIDE FOR BIOPSY: CPT | Mod: 26

## 2022-01-17 RX ORDER — SODIUM BICARBONATE 1 MEQ/ML
650 SYRINGE (ML) INTRAVENOUS
Refills: 0 | Status: DISCONTINUED | OUTPATIENT
Start: 2022-01-17 | End: 2022-01-17

## 2022-01-17 RX ORDER — SODIUM BICARBONATE 1 MEQ/ML
650 SYRINGE (ML) INTRAVENOUS THREE TIMES A DAY
Refills: 0 | Status: DISCONTINUED | OUTPATIENT
Start: 2022-01-17 | End: 2022-01-19

## 2022-01-17 RX ORDER — SODIUM CHLORIDE 9 MG/ML
1000 INJECTION, SOLUTION INTRAVENOUS
Refills: 0 | Status: DISCONTINUED | OUTPATIENT
Start: 2022-01-17 | End: 2022-01-18

## 2022-01-17 RX ADMIN — PANTOPRAZOLE SODIUM 40 MILLIGRAM(S): 20 TABLET, DELAYED RELEASE ORAL at 12:06

## 2022-01-17 RX ADMIN — PIPERACILLIN AND TAZOBACTAM 25 GRAM(S): 4; .5 INJECTION, POWDER, LYOPHILIZED, FOR SOLUTION INTRAVENOUS at 03:15

## 2022-01-17 RX ADMIN — PIPERACILLIN AND TAZOBACTAM 25 GRAM(S): 4; .5 INJECTION, POWDER, LYOPHILIZED, FOR SOLUTION INTRAVENOUS at 20:02

## 2022-01-17 RX ADMIN — PIPERACILLIN AND TAZOBACTAM 25 GRAM(S): 4; .5 INJECTION, POWDER, LYOPHILIZED, FOR SOLUTION INTRAVENOUS at 12:05

## 2022-01-17 RX ADMIN — ENOXAPARIN SODIUM 40 MILLIGRAM(S): 100 INJECTION SUBCUTANEOUS at 12:06

## 2022-01-17 RX ADMIN — SODIUM CHLORIDE 75 MILLILITER(S): 9 INJECTION, SOLUTION INTRAVENOUS at 11:01

## 2022-01-17 NOTE — PROVIDER CONTACT NOTE (OTHER) - BACKGROUND
HCP for patient MT Riddle rm 866A wants to speak with MD in regards to MANY concerns regarding his health and status.  She spoke with night nurse and day nurse now wants to speak with the MD.

## 2022-01-17 NOTE — PROGRESS NOTE ADULT - SUBJECTIVE AND OBJECTIVE BOX
[facility] [place] [facility]   MD ALEXANDER RUBY MD, PA INJUNG KO NP    TEL:  OFFICE: [phone]  DR MATT CELL: [phone]  DR. HOANG CELL: [phone]  CHIKIS GARNICA CELL: [phone]  GRACIELA SHAH CELL :[phone]    From 5pm-7am Answering Service [phone]    -- RENAL FOLLOW UP NOTE ---Date of Service 01-17-22 @ 15:45    Patient is a 66y old  Male who presents with a chief complaint of Altered Mental Status (17 Jan 2022 10:25)      Patient seen and examined at bedside.     VITALS:  T(F): 97.6 (01-17-22 @ 06:55), Max: 98.4 (01-16-22 @ 23:00)  HR: 87 (01-17-22 @ 06:55)  BP: 135/89 (01-17-22 @ 06:55)  RR: 18 (01-17-22 @ 06:55)  SpO2: 100% (01-17-22 @ 06:55)  Wt(kg): --        PHYSICAL EXAM:  Constitutional: NAD  Neck: No JVD  Respiratory: CTAB, no wheezes, rales or rhonchi  Cardiovascular: S1, S2, RRR  Gastrointestinal: BS+, soft, NT/ND  Extremities: No peripheral edema    Hospital Medications:   MEDICATIONS  (STANDING):  dextrose 5% + sodium chloride 0.9%. 1000 milliLiter(s) (75 mL/Hr) IV Continuous <Continuous>  enoxaparin Injectable 40 milliGRAM(s) SubCutaneous daily  influenza   Vaccine 0.5 milliLiter(s) IntraMuscular once  pantoprazole  Injectable 40 milliGRAM(s) IV Push daily  piperacillin/tazobactam IVPB.. 3.375 Gram(s) IV Intermittent every 8 hours  sodium bicarbonate 650 milliGRAM(s) Oral two times a day      LABS:  01-17    136  |  110<H>  |  31<H>  ----------------------------<  69<L>  4.3   |  14<L>  |  1.19    Ca    8.3<L>      17 Jan 2022 07:20  Phos  3.9     01-17  Mg     2.10     01-17    TPro  5.6<L>  /  Alb  2.8<L>  /  TBili  1.6<H>  /  DBili      /  AST  55<H>  /  ALT  64<H>  /  AlkPhos  756<H>  01-17    Creatinine Trend: 1.19 <--, 1.12 <--, 1.13 <--    Albumin, Serum: 2.8 g/dL (01-17 @ 07:20)  Phosphorus Level, Serum: 3.9 mg/dL (01-17 @ 07:20)                              8.0    6.54  )-----------( 107      ( 17 Jan 2022 07:20 )             23.2     Urine Studies:      TSH 4.34      [01-16-22 @ 10:09]    HCV 0.35, Nonreact      [01-17-22 @ 09:30]      RADIOLOGY & ADDITIONAL STUDIES:

## 2022-01-17 NOTE — PROGRESS NOTE ADULT - PROBLEM SELECTOR PLAN 5
-lobular cystic collection found on R hepatic lobe with associated central biliary duct dilatation.  -possibly infection/abscess vs. intrahepatic biliary radicals vs. cystic mets  -will get MR abdomen for further assessment.  -started zosyn

## 2022-01-17 NOTE — PROGRESS NOTE ADULT - ATTENDING COMMENTS
Continue empiric zosyn for now, f/u CT, likely toxic metabolic encephalopathy also contributing to neuro status, s/p lactulose Continue empiric zosyn for now, s/p paracentesis, cultures negative thus far, f/u MR, likely toxic metabolic encephalopathy also contributing to neuro status, s/p lactulose not applicable

## 2022-01-17 NOTE — PROGRESS NOTE ADULT - SUBJECTIVE AND OBJECTIVE BOX
Emery Cardoso MD  Internal Medicine PGY-1  Pager NS: [phone] // [facility]: [phone]    PROGRESS NOTE:     Patient is a 66y old  Male who presents with a chief complaint of Altered Mental Status (16 Jan 2022 16:27)      SUBJECTIVE / OVERNIGHT EVENTS:    No acute events overnight. Patient examined at bedside with no acute complaints.     REVIEW OF SYSTEMS:    CONSTITUTIONAL: No weakness, fevers or chills  EYES/ENT: No visual changes;  No vertigo or throat pain   NECK: No pain or stiffness  RESPIRATORY: No cough, wheezing, hemoptysis; No shortness of breath  CARDIOVASCULAR: No chest pain or palpitations  GASTROINTESTINAL: No abdominal or epigastric pain. No nausea, vomiting, or hematemesis; No diarrhea or constipation. No melena or hematochezia.  GENITOURINARY: No dysuria, frequency or hematuria  NEUROLOGICAL: No numbness or weakness  SKIN: No itching, rashes      MEDICATIONS  (STANDING):  enoxaparin Injectable 40 milliGRAM(s) SubCutaneous daily  influenza   Vaccine 0.5 milliLiter(s) IntraMuscular once  pantoprazole  Injectable 40 milliGRAM(s) IV Push daily  piperacillin/tazobactam IVPB.. 3.375 Gram(s) IV Intermittent every 8 hours    MEDICATIONS  (PRN):  acetaminophen     Tablet .. 650 milliGRAM(s) Oral every 6 hours PRN Temp greater or equal to 38C (100.4F), Mild Pain (1 - 3)  haloperidol    Injectable 2 milliGRAM(s) IV Push every 6 hours PRN Agitation  melatonin 3 milliGRAM(s) Oral at bedtime PRN Insomnia      CAPILLARY BLOOD GLUCOSE      POCT Blood Glucose.: 88 mg/dL (16 Jan 2022 09:14)    I&O's Summary      VITALS:   T(C): 36.4 (01-17-22 @ 06:55), Max: 36.9 (01-16-22 @ 23:00)  HR: 87 (01-17-22 @ 06:55) (76 - 89)  BP: 135/89 (01-17-22 @ 06:55) (129/68 - 149/86)  RR: 18 (01-17-22 @ 06:55) (14 - 18)  SpO2: 100% (01-17-22 @ 06:55) (100% - 100%)    GENERAL: NAD, lying in bed comfortably  HEAD:  Atraumatic, normocephalic  EYES: EOMI, PERRLA, conjunctiva and sclera clear  ENT: Moist mucous membranes  NECK: Supple, no JVD  HEART: Regular rate and rhythm, no murmurs, rubs, or gallops  LUNGS: Unlabored respirations.  Clear to auscultation bilaterally, no crackles, wheezing, or rhonchi  ABDOMEN: Soft, nontender, nondistended, +BS  EXTREMITIES: 2+ peripheral pulses bilaterally. No clubbing, cyanosis, or edema  NERVOUS SYSTEM:  A&Ox3, no focal deficits   SKIN: No rashes or lesions    LABS:                        8.6    5.07  )-----------( 108      ( 16 Jan 2022 10:09 )             25.7     01-16    135  |  107  |  30<H>  ----------------------------<  86  4.4   |  15<L>  |  1.12    Ca    8.6      16 Jan 2022 10:09  Phos  3.3     01-16  Mg     2.20     01-16    TPro  6.2  /  Alb  3.4  /  TBili  1.4<H>  /  DBili  x   /  AST  90<H>  /  ALT  94<H>  /  AlkPhos  934<H>  01-16    PT/INR - ( 16 Jan 2022 10:09 )   PT: 15.5 sec;   INR: 1.37 ratio         PTT - ( 16 Jan 2022 10:09 )  PTT:41.0 sec  CARDIAC MARKERS ( 16 Jan 2022 10:09 )  x     / x     / 56 U/L / x     / x              Culture - Body Fluid with Gram Stain (collected 16 Jan 2022 10:23)  Source: .Body Fluid Peritoneal Fluid  Gram Stain (16 Jan 2022 12:03):    polymorphonuclear leukocytes seen per low power field    No organisms seen per oil power field    by cytocentrifuge        RADIOLOGY & ADDITIONAL TESTS:  Results Reviewed:   Imaging Personally Reviewed:  Electrocardiogram Personally Reviewed:    COORDINATION OF CARE:  Care Discussed with Consultants/Other Providers [Y/N]:  Prior or Outpatient Records Reviewed [Y/N]:   Emery Cardoso MD  Internal Medicine PGY-1  Pager NS: [phone] // [facility]: [phone]    PROGRESS NOTE:     Patient is a 66y old  Male who presents with a chief complaint of Altered Mental Status (16 Jan 2022 16:27)      SUBJECTIVE / OVERNIGHT EVENTS:    No acute events overnight. Patient examined at bedside, more responsive this AM. Responds to verbal stimuli, able to nod or shake head yes/no. Patient's temperature increased, consistently 97F. Spoke to daughter who is HCP who asked about allowing visitor/caretaker for pt, informed her that was not allowed per hospital policy. She also inquired about when patient would be eligible for transfer out of COVID unit - per infection control protocol's an immunocompromised pt must remain in unit for 21 days after first testing postive. First + test was 12/21, eligible 1/20 to be moved off unit.    REVIEW OF SYSTEMS: Unable to be obtained due to patient's mental status    MEDICATIONS  (STANDING):  enoxaparin Injectable 40 milliGRAM(s) SubCutaneous daily  influenza   Vaccine 0.5 milliLiter(s) IntraMuscular once  pantoprazole  Injectable 40 milliGRAM(s) IV Push daily  piperacillin/tazobactam IVPB.. 3.375 Gram(s) IV Intermittent every 8 hours    MEDICATIONS  (PRN):  acetaminophen     Tablet .. 650 milliGRAM(s) Oral every 6 hours PRN Temp greater or equal to 38C (100.4F), Mild Pain (1 - 3)  haloperidol    Injectable 2 milliGRAM(s) IV Push every 6 hours PRN Agitation  melatonin 3 milliGRAM(s) Oral at bedtime PRN Insomnia      CAPILLARY BLOOD GLUCOSE      POCT Blood Glucose.: 88 mg/dL (16 Jan 2022 09:14)    I&O's Summary      VITALS:   T(C): 36.4 (01-17-22 @ 06:55), Max: 36.9 (01-16-22 @ 23:00)  HR: 87 (01-17-22 @ 06:55) (76 - 89)  BP: 135/89 (01-17-22 @ 06:55) (129/68 - 149/86)  RR: 18 (01-17-22 @ 06:55) (14 - 18)  SpO2: 100% (01-17-22 @ 06:55) (100% - 100%)    GENERAL: NAD, lying in bed, thin appearance  HEAD:  Atraumatic, normocephalic  HEART: Regular rate and rhythm, no murmurs, rubs, or gallops  LUNGS: Unlabored respirations. Clear to auscultation bilaterally, no crackles, wheezing, or rhonchi  ABDOMEN: Soft, nontender, nondistended, +BS  EXTREMITIES: 2+ peripheral pulses bilaterally. No clubbing, cyanosis, or edema  NERVOUS SYSTEM:  A&Ox0, responsive to verbal stimuli, no focal deficits   SKIN: No rashes or lesions    LABS:                        8.6    5.07  )-----------( 108      ( 16 Jan 2022 10:09 )             25.7     01-16    135  |  107  |  30<H>  ----------------------------<  86  4.4   |  15<L>  |  1.12    Ca    8.6      16 Jan 2022 10:09  Phos  3.3     01-16  Mg     2.20     01-16    TPro  6.2  /  Alb  3.4  /  TBili  1.4<H>  /  DBili  x   /  AST  90<H>  /  ALT  94<H>  /  AlkPhos  934<H>  01-16    PT/INR - ( 16 Jan 2022 10:09 )   PT: 15.5 sec;   INR: 1.37 ratio         PTT - ( 16 Jan 2022 10:09 )  PTT:41.0 sec  CARDIAC MARKERS ( 16 Jan 2022 10:09 )  x     / x     / 56 U/L / x     / x              Culture - Body Fluid with Gram Stain (collected 16 Jan 2022 10:23)  Source: .Body Fluid Peritoneal Fluid  Gram Stain (16 Jan 2022 12:03):    polymorphonuclear leukocytes seen per low power field    No organisms seen per oil power field    by cytocentrifuge        RADIOLOGY & ADDITIONAL TESTS:  Results Reviewed:   Imaging Personally Reviewed:  Electrocardiogram Personally Reviewed:    COORDINATION OF CARE:  Care Discussed with Consultants/Other Providers [Y/N]:  Prior or Outpatient Records Reviewed [Y/N]:

## 2022-01-17 NOTE — CONSULT NOTE ADULT - CONSULT REASON
AMS
Hyponatremia
Known [facility] patient of Dr. Mosquera. Has cholangiocarcinoma. Onc consulted for continuity of care.

## 2022-01-17 NOTE — PROGRESS NOTE ADULT - PROBLEM SELECTOR PLAN 1
-pt. with 1 day hx AMS prior to admission, was reportedly eating napkins at dinner per family  -Pt. altered on presentation to ED  -Hyperammonemia vs. COVID  -pt. received lactulose in ED  -pt received haldol and ativan prior to CT scan - patient asleep due to medications  -will reassess patient later this afternoon to further assess mental status. -pt. with 1 day hx AMS prior to admission, was reportedly eating napkins at dinner per family  -Pt. altered on presentation to ED  -Hyperammonemia vs. COVID  -pt. received lactulose in ED  -pt received haldol and ativan prior to CT scan - patient asleep due to medications  -mental status improved, responsive to verbal stimuli and yes/no questions but still A&Ox0

## 2022-01-17 NOTE — PROGRESS NOTE ADULT - PROBLEM SELECTOR PLAN 7
-pt has known Hx cholangiocarcinoma s/p resection, chemo, RT  -will get f/u imaging to determine progression that would explain hyperammonemia and search for mets.

## 2022-01-17 NOTE — CONSULT NOTE ADULT - SUBJECTIVE AND OBJECTIVE BOX
HPI:-- Obtained via chart review as pt COVID positive   Mr. Riddle is a 66YOM with PMH of cholangiocarcinoma (s/p resection, chemo, RT, and bile duct/L liver lobe resection + Ju en Y Hepaticojejunostomy), HTN, HLD, BRIAN, and TB as a child who presents with SOB for 3 days and AMS for 1 day. Per the patient's daughter who is a nurse and HCP alongside her brother, the patient had recently been hospitalized at [facility] for sepsis from 12/7-12/21 and finished a course of IV ceftriaxone at home without complication. He was previously on eliquis that was held during that hospitalization. He had tested positive for COVID after his discharge but never had any symptoms or treatment and was A&Ox4 at baseline. On Thursday 1/13/21 pt. went for therapeutic paracentesis that was completed without incident. The following day, the patient was noted to have some difficulty breathing after eating dinner, thought to be due to GERD symptoms. His family gave him pantoprazole and his symptoms improved, however the following day 1/15, the patient reported not feeling like himself with continued difficulty breathing. He went to an urgent care center and was recommended to go to a hospital for workup of a possible PE, however pt. refused to go and went home instead. Later that evening he was found to be confused at dinner time where per family he was eating dinner but also ripping up his napkins and attempting to eat them. He was answering questions appropriately at the time and was still alert and oriented, however his family took him to the ED.     In the ED the patient was found to by hypothermic with an elevated ammonia level to 80 and was given lactulose enema. He was agitated and received 5mg haldol and 2mg ativan prior to CT imaging of his head and abdomen. Imaging was notable for lobular cystic collection on R hepatic lobe with further MRI imaging recommended. No PE was found on chest CT. He underwent 50cc paracentesis and 1L fluid bolus was given in addition to 1g ceftriaxone. Prior to medication administration pt. was complaining of SOB, abdominal pain, and had altered mental status. (16 Jan 2022 12:03)      14 point ROS otherwise negative    PAST MEDICAL & SURGICAL HISTORY:  Liver mass    TB (tuberculosis)  age 17    HTN (hypertension)    Hypercholesterolemia    BRIAN (obstructive sleep apnea)    Cholangiocarcinoma  diagnosed 2015    Portal vein thrombosis  9/2021    History of abdominal surgery  h/o bile duct and left lobe of liver resected and Ju En Y hepaticojejunostomy    History of liver biopsy  9/2021        Allergies    No Known Allergies    Intolerances        MEDICATIONS  (STANDING):  dextrose 5% + sodium chloride 0.9%. 1000 milliLiter(s) (75 mL/Hr) IV Continuous <Continuous>  enoxaparin Injectable 40 milliGRAM(s) SubCutaneous daily  influenza   Vaccine 0.5 milliLiter(s) IntraMuscular once  pantoprazole  Injectable 40 milliGRAM(s) IV Push daily  piperacillin/tazobactam IVPB.. 3.375 Gram(s) IV Intermittent every 8 hours    MEDICATIONS  (PRN):  acetaminophen     Tablet .. 650 milliGRAM(s) Oral every 6 hours PRN Temp greater or equal to 38C (100.4F), Mild Pain (1 - 3)  haloperidol    Injectable 2 milliGRAM(s) IV Push every 6 hours PRN Agitation  melatonin 3 milliGRAM(s) Oral at bedtime PRN Insomnia      FAMILY HISTORY:  Family history of diabetes mellitus (Father)        SOCIAL HISTORY: No EtOH, no tobacco        VITALS:   T(F): 97.6 (01-17-22 @ 06:55), Max: 98.4 (01-16-22 @ 23:00)  HR: 87 (01-17-22 @ 06:55)  BP: 135/89 (01-17-22 @ 06:55)  RR: 18 (01-17-22 @ 06:55)  SpO2: 100% (01-17-22 @ 06:55)  Wt(kg): --    PHYSICAL EXAM  Per primary team     LABS:                         8.0    6.54  )-----------( 107      ( 17 Jan 2022 07:20 )             23.2     01-17    136  |  110<H>  |  31<H>  ----------------------------<  69<L>  4.3   |  14<L>  |  1.19    Ca    8.3<L>      17 Jan 2022 07:20  Phos  3.9     01-17  Mg     2.10     01-17    TPro  5.6<L>  /  Alb  2.8<L>  /  TBili  1.6<H>  /  DBili  x   /  AST  55<H>  /  ALT  64<H>  /  AlkPhos  756<H>  01-17    Magnesium, Serum: 2.10 mg/dL (01-17 @ 07:20)  Phosphorus Level, Serum: 3.9 mg/dL (01-17 @ 07:20)    PT/INR - ( 16 Jan 2022 10:09 )   PT: 15.5 sec;   INR: 1.37 ratio         PTT - ( 16 Jan 2022 10:09 )  PTT:41.0 sec  .Body Fluid Peritoneal Fluid  01-16 @ 10:23   No growth  --    polymorphonuclear leukocytes seen per low power field  No organisms seen per oil power field  by cytocentrifuge      .Blood Blood-Peripheral  01-16 @ 07:16   No growth to date.  --  --      .Blood Blood-Peripheral  01-16 @ 07:15   No growth to date.  --  --          IMAGING: HPI:-- Obtained via chart review as pt COVID positive. Also talked to patient's son and daughter on the phone.   Mr. Riddle is a 66YOM with PMH of cholangiocarcinoma (s/p resection, chemo, RT, and bile duct/L liver lobe resection + Ju en Y Hepaticojejunostomy), HTN, HLD, BRIAN, and TB as a child who presents with SOB for 3 days and AMS for 1 day. Per the patient's daughter who is a nurse and HCP alongside her brother, the patient had recently been hospitalized at [facility] for sepsis from 12/7-12/21 and finished a course of IV ceftriaxone at home without complication. He was previously on eliquis that was held during that hospitalization. He had tested positive for COVID after his discharge but never had any symptoms or treatment and was A&Ox4 at baseline. On Thursday 1/13/21 pt. went for therapeutic paracentesis that was completed without incident. The following day, the patient was noted to have some difficulty breathing after eating dinner, thought to be due to GERD symptoms. His family gave him pantoprazole and his symptoms improved, however the following day 1/15, the patient reported not feeling like himself with continued difficulty breathing. He went to an urgent care center and was recommended to go to a hospital for workup of a possible PE, however pt. refused to go and went home instead. Later that evening he was found to be confused at dinner time where per family he was eating dinner but also ripping up his napkins and attempting to eat them. He was answering questions appropriately at the time and was still alert and oriented, however his family took him to the ED.     In the ED the patient was found to by hypothermic with an elevated ammonia level to 80 and was given lactulose enema. He was agitated and received 5mg haldol and 2mg ativan prior to CT imaging of his head and abdomen. Imaging was notable for lobular cystic collection on R hepatic lobe with further MRI imaging recommended. No PE was found on chest CT. He underwent 50cc paracentesis and 1L fluid bolus was given in addition to 1g ceftriaxone. Prior to medication administration pt. was complaining of SOB, abdominal pain, and had altered mental status. (16 Jan 2022 12:03)      14 point ROS otherwise negative    PAST MEDICAL & SURGICAL HISTORY:  Liver mass    TB (tuberculosis)  age 17    HTN (hypertension)    Hypercholesterolemia    BRIAN (obstructive sleep apnea)    Cholangiocarcinoma  diagnosed 2015    Portal vein thrombosis  9/2021    History of abdominal surgery  h/o bile duct and left lobe of liver resected and Ju En Y hepaticojejunostomy    History of liver biopsy  9/2021        Allergies    No Known Allergies    Intolerances        MEDICATIONS  (STANDING):  dextrose 5% + sodium chloride 0.9%. 1000 milliLiter(s) (75 mL/Hr) IV Continuous <Continuous>  enoxaparin Injectable 40 milliGRAM(s) SubCutaneous daily  influenza   Vaccine 0.5 milliLiter(s) IntraMuscular once  pantoprazole  Injectable 40 milliGRAM(s) IV Push daily  piperacillin/tazobactam IVPB.. 3.375 Gram(s) IV Intermittent every 8 hours    MEDICATIONS  (PRN):  acetaminophen     Tablet .. 650 milliGRAM(s) Oral every 6 hours PRN Temp greater or equal to 38C (100.4F), Mild Pain (1 - 3)  haloperidol    Injectable 2 milliGRAM(s) IV Push every 6 hours PRN Agitation  melatonin 3 milliGRAM(s) Oral at bedtime PRN Insomnia      FAMILY HISTORY:  Family history of diabetes mellitus (Father)        SOCIAL HISTORY: No EtOH, no tobacco        VITALS:   T(F): 97.6 (01-17-22 @ 06:55), Max: 98.4 (01-16-22 @ 23:00)  HR: 87 (01-17-22 @ 06:55)  BP: 135/89 (01-17-22 @ 06:55)  RR: 18 (01-17-22 @ 06:55)  SpO2: 100% (01-17-22 @ 06:55)  Wt(kg): --    PHYSICAL EXAM  Per primary team     LABS:                         8.0    6.54  )-----------( 107      ( 17 Jan 2022 07:20 )             23.2     01-17    136  |  110<H>  |  31<H>  ----------------------------<  69<L>  4.3   |  14<L>  |  1.19    Ca    8.3<L>      17 Jan 2022 07:20  Phos  3.9     01-17  Mg     2.10     01-17    TPro  5.6<L>  /  Alb  2.8<L>  /  TBili  1.6<H>  /  DBili  x   /  AST  55<H>  /  ALT  64<H>  /  AlkPhos  756<H>  01-17    Magnesium, Serum: 2.10 mg/dL (01-17 @ 07:20)  Phosphorus Level, Serum: 3.9 mg/dL (01-17 @ 07:20)    PT/INR - ( 16 Jan 2022 10:09 )   PT: 15.5 sec;   INR: 1.37 ratio         PTT - ( 16 Jan 2022 10:09 )  PTT:41.0 sec  .Body Fluid Peritoneal Fluid  01-16 @ 10:23   No growth  --    polymorphonuclear leukocytes seen per low power field  No organisms seen per oil power field  by cytocentrifuge      .Blood Blood-Peripheral  01-16 @ 07:16   No growth to date.  --  --      .Blood Blood-Peripheral  01-16 @ 07:15   No growth to date.  --  --          IMAGING: Reviewed

## 2022-01-17 NOTE — PROGRESS NOTE ADULT - PROBLEM SELECTOR PLAN 2
-pt. temperature 95.8 rectal, 96 on repeat.  -pt. not displaying other sepsis criteria - normal HR, nontachycardic, no leukocytosis.  -etiology possibly neurologic, consider MR head  -bear hugger administered  -Bcx drawn in ED, will f/u  -will give empiric zosyn at this time -pt. temperature 95.8 rectal, 96 on repeat.  -pt. not displaying other sepsis criteria - normal HR, nontachycardic, no leukocytosis.  -etiology possibly neurologic, consider MR head  -bear violettaezioer administered  -Bcx drawn in ED, NGTD  -will give empiric zosyn at this time

## 2022-01-17 NOTE — PROGRESS NOTE ADULT - ASSESSMENT
6YOM with PMH of cholangiocarcinoma (s/p resection, chemo, RT, and bile duct/L liver lobe resection + Ju en Y Hepaticojejunostomy), HTN, HLD, BRIAN, and TB as a child who admitted with SOB for 3 days and AMS for 1 day.  Found to be COVID positive but on RA.     Hyponatremia   possibly hypervolemic in the setting of ascites vs. Hypovolemia  in the setting of poor oral intake. vs SIADH  Serum sodium improved today.  TSH slightly high.  Check aM cortisol   Check urine lytes.   monitor BMP     Acidosis   without anion gap  start sodium bicarb 650mg tid  6YOM with PMH of cholangiocarcinoma (s/p resection, chemo, RT, and bile duct/L liver lobe resection + Ju en Y Hepaticojejunostomy), HTN, HLD, BRIAN, and TB as a child who admitted with SOB for 3 days and AMS for 1 day.  Found to be COVID positive but on RA.     Hyponatremia   possibly hypervolemic in the setting of ascites vs. Hypovolemia  in the setting of poor oral intake. vs SIADH  Serum sodium improved today.  TSH slightly high.  Check aM cortisol   Check urine Na, osmolality.   monitor BMP     Acidosis   without anion gap  start sodium bicarb 650mg tid

## 2022-01-17 NOTE — CONSULT NOTE ADULT - ASSESSMENT
66YOM with PMH of cholangiocarcinoma (s/p resection and bile duct/L liver lobe resection + Ju en Y Hepaticojejunostomy), HTN, HLD, BRIAN, and TB as a child who presents with SOB for 3 days and AMS. Currently COVID positive and being treated for hypothermia and hyperammonemia. New liver mass and MRI abd pending. Oncology consulted for continuity of care.     # Naomie tumor  - Dx 2015  - In 2016,  patient underwent partial hepatectomy including the left lobe and gallbladder, removal of the extrahepatic bile duct, hepaticojejunostomy to the Ju-en-Y, reconstruction of the portal vein with saphenous vein patch; pT3N0 at the time.   - Continued on surveillance until 202 when he had intraabd recurrence- received Harlan/Abraxane + RT- per notes it seems patient progressed on this. Per son, he did not tolerate this well due to thrombocytopenia and stopped it in 3/2021. He developed an upper abd mass. Plan was to potentially start patient on Onivyde, 5-FU, leucovorin with immunotherapy but it seems he has had several admissions recently-- due to portal vein and mesenteric blood vessel DVT, E.Coli sepsis and ascites   - Per d/w son, patient has never received RT   - Last outpatient CT on 09/23/21: 6 cm RP mass contributing to portal vein thrombosis, mesenteric venous congestion, borderline splenomegaly and ascites. ? recurrent/ metastatic disease occurring in setting of prior left hepatectomy. 5.7 cm cystic ant mediastinal mass- may be post-treatment changes.   - CT 1/16/2022: Decreased size right mediastinal cystic mass. New lobular cystic collection occupying large portion  of the right hepatic lobe with increase central biliary duct dilatation. This could be due to infection/abscess, markedly distended intrahepatic biliary radicals or cystic metastasis. Further evaluation with contrast enhanced liver MRI is recommended. Retroperitoneal soft tissue mass not significantly changed with associated vascular encasement and luminal narrowing. Chronically thrombosed portal vein. Large volume ascites  - Agree with MRI abd to further evaluate this  - Agree with infectious workup per primary team (for hypothermia)  - No plans for inpatient treatment- will discuss further with Dr. Emma Wilson, PGY-5  Hematology-Oncology Fellow  [phone] ([place]) [phone] ([facility])  I can also be reached on [employer] Teams  Please page fellow on call after 5pm and on weekends

## 2022-01-17 NOTE — CONSULT NOTE ADULT - ATTENDING COMMENTS
Case discussed with Dr Wilson. Plan as above.   66YOM with PMH of cholangiocarcinoma (s/p resection and bile duct/L liver lobe resection + Ju en Y Hepaticojejunostomy), HTN, HLD, BRIAN, and TB as a child who presents with SOB for 3 days and AMS. Currently COVID positive and being treated for hypothermia and hyperammonemia.   New liver mass and MRI abd pending.  Of note, patient has not been on treatment for malignancy since 3/2021, he has had multiple hospitalizations recently.  CT 1/16/2022: Decreased size right mediastinal cystic mass. New lobular cystic collection occupying large portion  of the right hepatic lobe with increase central biliary duct dilatation. This could be due to infection/abscess, markedly distended intrahepatic biliary radicals or cystic metastasis. Further evaluation with contrast enhanced liver MRI is recommended. Retroperitoneal soft tissue mass not significantly changed with associated vascular encasement and luminal narrowing. Chronically thrombosed portal vein. Large volume ascites  MRI pending  Will follow  Management as per primary team
67 yo M with PMH of cholangiocarcinoma (s/p resection, chemo, RT, and bile duct/L liver lobe resection + Ju en Y Hepaticojejunostomy), HTN, BRIAN, distant history of TB, presenting for AMS and found to be COVID+. MICU consult for concerns of airway protection. Patient does not answer questions appropriately is protecting his airway and saturating well on room air. Currently not a candidate for MICU admission. Please re-consult as needed.

## 2022-01-18 LAB
ALBUMIN SERPL ELPH-MCNC: 2 G/DL — LOW (ref 3.3–5)
ALBUMIN SERPL ELPH-MCNC: 2.9 G/DL — LOW (ref 3.3–5)
ALP SERPL-CCNC: 530 U/L — HIGH (ref 40–120)
ALP SERPL-CCNC: 732 U/L — HIGH (ref 40–120)
ALT FLD-CCNC: 33 U/L — SIGNIFICANT CHANGE UP (ref 4–41)
ALT FLD-CCNC: 57 U/L — HIGH (ref 4–41)
ANION GAP SERPL CALC-SCNC: 11 MMOL/L — SIGNIFICANT CHANGE UP (ref 7–14)
ANION GAP SERPL CALC-SCNC: 15 MMOL/L — HIGH (ref 7–14)
AST SERPL-CCNC: 30 U/L — SIGNIFICANT CHANGE UP (ref 4–40)
AST SERPL-CCNC: 43 U/L — HIGH (ref 4–40)
BASE EXCESS BLDV CALC-SCNC: -13.7 MMOL/L — LOW (ref -2–3)
BILIRUB SERPL-MCNC: 1.3 MG/DL — HIGH (ref 0.2–1.2)
BILIRUB SERPL-MCNC: 2 MG/DL — HIGH (ref 0.2–1.2)
BLOOD GAS VENOUS COMPREHENSIVE RESULT: SIGNIFICANT CHANGE UP
BUN SERPL-MCNC: 22 MG/DL — SIGNIFICANT CHANGE UP (ref 7–23)
BUN SERPL-MCNC: 28 MG/DL — HIGH (ref 7–23)
CALCIUM SERPL-MCNC: 5.8 MG/DL — CRITICAL LOW (ref 8.4–10.5)
CALCIUM SERPL-MCNC: 8 MG/DL — LOW (ref 8.4–10.5)
CHLORIDE BLDV-SCNC: 111 MMOL/L — HIGH (ref 96–108)
CHLORIDE SERPL-SCNC: 106 MMOL/L — SIGNIFICANT CHANGE UP (ref 98–107)
CHLORIDE SERPL-SCNC: 108 MMOL/L — HIGH (ref 98–107)
CO2 BLDV-SCNC: 11 MMOL/L — LOW (ref 22–26)
CO2 SERPL-SCNC: 13 MMOL/L — LOW (ref 22–31)
CO2 SERPL-SCNC: 17 MMOL/L — LOW (ref 22–31)
CORTIS AM PEAK SERPL-MCNC: 12.6 UG/DL — SIGNIFICANT CHANGE UP (ref 6–18.4)
CREAT SERPL-MCNC: 1.05 MG/DL — SIGNIFICANT CHANGE UP (ref 0.5–1.3)
CREAT SERPL-MCNC: 1.37 MG/DL — HIGH (ref 0.5–1.3)
GAS PNL BLDV: 135 MMOL/L — LOW (ref 136–145)
GLUCOSE BLDV-MCNC: > 685 MG/DL — SIGNIFICANT CHANGE UP (ref 70–99)
GLUCOSE SERPL-MCNC: 1042 MG/DL — CRITICAL HIGH (ref 70–99)
GLUCOSE SERPL-MCNC: 80 MG/DL — SIGNIFICANT CHANGE UP (ref 70–99)
HCO3 BLDV-SCNC: 10 MMOL/L — CRITICAL LOW (ref 22–29)
HCT VFR BLD CALC: 19.1 % — CRITICAL LOW (ref 39–50)
HCT VFR BLD CALC: 23.9 % — LOW (ref 39–50)
HCT VFR BLDA CALC: 17 % — CRITICAL LOW (ref 39–51)
HGB BLD CALC-MCNC: 5.8 G/DL — CRITICAL LOW (ref 13–17)
HGB BLD-MCNC: 6.3 G/DL — CRITICAL LOW (ref 13–17)
HGB BLD-MCNC: 8.2 G/DL — LOW (ref 13–17)
LACTATE BLDV-MCNC: 1 MMOL/L — SIGNIFICANT CHANGE UP (ref 0.5–2)
MAGNESIUM SERPL-MCNC: 1.5 MG/DL — LOW (ref 1.6–2.6)
MAGNESIUM SERPL-MCNC: 2 MG/DL — SIGNIFICANT CHANGE UP (ref 1.6–2.6)
MCHC RBC-ENTMCNC: 29.2 PG — SIGNIFICANT CHANGE UP (ref 27–34)
MCHC RBC-ENTMCNC: 29.4 PG — SIGNIFICANT CHANGE UP (ref 27–34)
MCHC RBC-ENTMCNC: 33 GM/DL — SIGNIFICANT CHANGE UP (ref 32–36)
MCHC RBC-ENTMCNC: 34.3 GM/DL — SIGNIFICANT CHANGE UP (ref 32–36)
MCV RBC AUTO: 85.7 FL — SIGNIFICANT CHANGE UP (ref 80–100)
MCV RBC AUTO: 88.4 FL — SIGNIFICANT CHANGE UP (ref 80–100)
NRBC # BLD: 0 /100 WBCS — SIGNIFICANT CHANGE UP
NRBC # BLD: 0 /100 WBCS — SIGNIFICANT CHANGE UP
NRBC # FLD: 0 K/UL — SIGNIFICANT CHANGE UP
NRBC # FLD: 0 K/UL — SIGNIFICANT CHANGE UP
PCO2 BLDV: 18 MMHG — LOW (ref 42–55)
PH BLDV: 7.37 — SIGNIFICANT CHANGE UP (ref 7.32–7.43)
PHOSPHATE SERPL-MCNC: 2.8 MG/DL — SIGNIFICANT CHANGE UP (ref 2.5–4.5)
PHOSPHATE SERPL-MCNC: 3.8 MG/DL — SIGNIFICANT CHANGE UP (ref 2.5–4.5)
PLATELET # BLD AUTO: 118 K/UL — LOW (ref 150–400)
PLATELET # BLD AUTO: 97 K/UL — LOW (ref 150–400)
PO2 BLDV: 60 MMHG — SIGNIFICANT CHANGE UP
POTASSIUM BLDV-SCNC: 2.8 MMOL/L — CRITICAL LOW (ref 3.5–5.1)
POTASSIUM SERPL-MCNC: 3.2 MMOL/L — LOW (ref 3.5–5.3)
POTASSIUM SERPL-MCNC: 4 MMOL/L — SIGNIFICANT CHANGE UP (ref 3.5–5.3)
POTASSIUM SERPL-SCNC: 3.2 MMOL/L — LOW (ref 3.5–5.3)
POTASSIUM SERPL-SCNC: 4 MMOL/L — SIGNIFICANT CHANGE UP (ref 3.5–5.3)
PROT SERPL-MCNC: 4.7 G/DL — LOW (ref 6–8.3)
PROT SERPL-MCNC: 6.1 G/DL — SIGNIFICANT CHANGE UP (ref 6–8.3)
RBC # BLD: 2.16 M/UL — LOW (ref 4.2–5.8)
RBC # BLD: 2.79 M/UL — LOW (ref 4.2–5.8)
RBC # FLD: 20.2 % — HIGH (ref 10.3–14.5)
RBC # FLD: 20.5 % — HIGH (ref 10.3–14.5)
SAO2 % BLDV: 92.3 % — SIGNIFICANT CHANGE UP
SODIUM SERPL-SCNC: 134 MMOL/L — LOW (ref 135–145)
SODIUM SERPL-SCNC: 136 MMOL/L — SIGNIFICANT CHANGE UP (ref 135–145)
WBC # BLD: 4.25 K/UL — SIGNIFICANT CHANGE UP (ref 3.8–10.5)
WBC # BLD: 5.65 K/UL — SIGNIFICANT CHANGE UP (ref 3.8–10.5)
WBC # FLD AUTO: 4.25 K/UL — SIGNIFICANT CHANGE UP (ref 3.8–10.5)
WBC # FLD AUTO: 5.65 K/UL — SIGNIFICANT CHANGE UP (ref 3.8–10.5)

## 2022-01-18 PROCEDURE — 93970 EXTREMITY STUDY: CPT | Mod: 26

## 2022-01-18 PROCEDURE — 74183 MRI ABD W/O CNTR FLWD CNTR: CPT | Mod: 26

## 2022-01-18 PROCEDURE — 99232 SBSQ HOSP IP/OBS MODERATE 35: CPT | Mod: GC

## 2022-01-18 RX ORDER — POTASSIUM CHLORIDE 20 MEQ
10 PACKET (EA) ORAL
Refills: 0 | Status: DISCONTINUED | OUTPATIENT
Start: 2022-01-18 | End: 2022-01-18

## 2022-01-18 RX ORDER — CALCIUM GLUCONATE 100 MG/ML
2 VIAL (ML) INTRAVENOUS ONCE
Refills: 0 | Status: DISCONTINUED | OUTPATIENT
Start: 2022-01-18 | End: 2022-01-18

## 2022-01-18 RX ADMIN — PANTOPRAZOLE SODIUM 40 MILLIGRAM(S): 20 TABLET, DELAYED RELEASE ORAL at 13:06

## 2022-01-18 RX ADMIN — SODIUM CHLORIDE 75 MILLILITER(S): 9 INJECTION, SOLUTION INTRAVENOUS at 00:10

## 2022-01-18 RX ADMIN — Medication 650 MILLIGRAM(S): at 13:06

## 2022-01-18 RX ADMIN — ENOXAPARIN SODIUM 40 MILLIGRAM(S): 100 INJECTION SUBCUTANEOUS at 13:07

## 2022-01-18 RX ADMIN — Medication 650 MILLIGRAM(S): at 21:48

## 2022-01-18 RX ADMIN — Medication 650 MILLIGRAM(S): at 06:24

## 2022-01-18 RX ADMIN — PIPERACILLIN AND TAZOBACTAM 25 GRAM(S): 4; .5 INJECTION, POWDER, LYOPHILIZED, FOR SOLUTION INTRAVENOUS at 02:29

## 2022-01-18 NOTE — PROGRESS NOTE ADULT - ASSESSMENT
6YOM with PMH of cholangiocarcinoma (s/p resection, chemo, RT, and bile duct/L liver lobe resection + Ju en Y Hepaticojejunostomy), HTN, HLD, BRIAN, and TB as a child who admitted with SOB for 3 days and AMS for 1 day.  Found to be COVID positive but on RA.     Hyponatremia   possibly hypervolemic in the setting of ascites vs. Hypovolemia  in the setting of poor oral intake. vs SIADH  Serum sodium improved today.  TSH slightly high.  Check aM cortisol   Check urine Na, osmolality.   monitor BMP     Acidosis   without anion gap  improving   on sodium bicarb 650mg tid     HTN  acceptable   monitor on current meds

## 2022-01-18 NOTE — PROGRESS NOTE ADULT - SUBJECTIVE AND OBJECTIVE BOX
Emery Cardoso MD  Internal Medicine PGY-1  Pager NS: [phone] // [facility]: [phone]    PROGRESS NOTE:     Patient is a 66y old  Male who presents with a chief complaint of Altered Mental Status (17 Jan 2022 15:44)      SUBJECTIVE / OVERNIGHT EVENTS:    No acute events overnight. Patient examined at bedside with no acute complaints.     REVIEW OF SYSTEMS:    CONSTITUTIONAL: No weakness, fevers or chills  EYES/ENT: No visual changes;  No vertigo or throat pain   NECK: No pain or stiffness  RESPIRATORY: No cough, wheezing, hemoptysis; No shortness of breath  CARDIOVASCULAR: No chest pain or palpitations  GASTROINTESTINAL: No abdominal or epigastric pain. No nausea, vomiting, or hematemesis; No diarrhea or constipation. No melena or hematochezia.  GENITOURINARY: No dysuria, frequency or hematuria  NEUROLOGICAL: No numbness or weakness  SKIN: No itching, rashes      MEDICATIONS  (STANDING):  dextrose 5% + sodium chloride 0.9%. 1000 milliLiter(s) (75 mL/Hr) IV Continuous <Continuous>  enoxaparin Injectable 40 milliGRAM(s) SubCutaneous daily  influenza   Vaccine 0.5 milliLiter(s) IntraMuscular once  pantoprazole  Injectable 40 milliGRAM(s) IV Push daily  piperacillin/tazobactam IVPB.. 3.375 Gram(s) IV Intermittent every 8 hours  sodium bicarbonate 650 milliGRAM(s) Oral three times a day    MEDICATIONS  (PRN):  acetaminophen     Tablet .. 650 milliGRAM(s) Oral every 6 hours PRN Temp greater or equal to 38C (100.4F), Mild Pain (1 - 3)  haloperidol    Injectable 2 milliGRAM(s) IV Push every 6 hours PRN Agitation  melatonin 3 milliGRAM(s) Oral at bedtime PRN Insomnia      CAPILLARY BLOOD GLUCOSE      POCT Blood Glucose.: 104 mg/dL (17 Jan 2022 21:21)  POCT Blood Glucose.: 89 mg/dL (17 Jan 2022 16:55)  POCT Blood Glucose.: 75 mg/dL (17 Jan 2022 12:04)    I&O's Summary    17 Jan 2022 07:01  -  18 Jan 2022 07:00  --------------------------------------------------------  IN: 0 mL / OUT: 150 mL / NET: -150 mL        VITALS:   T(C): 36.7 (01-18-22 @ 06:20), Max: 36.7 (01-18-22 @ 02:03)  HR: 73 (01-18-22 @ 06:20) (73 - 76)  BP: 132/83 (01-18-22 @ 06:20) (112/71 - 134/76)  RR: 18 (01-18-22 @ 06:20) (17 - 18)  SpO2: 100% (01-18-22 @ 06:20) (100% - 100%)    GENERAL: NAD, lying in bed comfortably  HEAD:  Atraumatic, normocephalic  EYES: EOMI, PERRLA, conjunctiva and sclera clear  ENT: Moist mucous membranes  NECK: Supple, no JVD  HEART: Regular rate and rhythm, no murmurs, rubs, or gallops  LUNGS: Unlabored respirations.  Clear to auscultation bilaterally, no crackles, wheezing, or rhonchi  ABDOMEN: Soft, nontender, nondistended, +BS  EXTREMITIES: 2+ peripheral pulses bilaterally. No clubbing, cyanosis, or edema  NERVOUS SYSTEM:  A&Ox3, no focal deficits   SKIN: No rashes or lesions    LABS:                        8.0    6.54  )-----------( 107      ( 17 Jan 2022 07:20 )             23.2     01-17    136  |  110<H>  |  31<H>  ----------------------------<  69<L>  4.3   |  14<L>  |  1.19    Ca    8.3<L>      17 Jan 2022 07:20  Phos  3.9     01-17  Mg     2.10     01-17    TPro  5.6<L>  /  Alb  2.8<L>  /  TBili  1.6<H>  /  DBili  x   /  AST  55<H>  /  ALT  64<H>  /  AlkPhos  756<H>  01-17    PT/INR - ( 16 Jan 2022 10:09 )   PT: 15.5 sec;   INR: 1.37 ratio         PTT - ( 16 Jan 2022 10:09 )  PTT:41.0 sec  CARDIAC MARKERS ( 16 Jan 2022 10:09 )  x     / x     / 56 U/L / x     / x              Culture - Body Fluid with Gram Stain (collected 16 Jan 2022 10:23)  Source: .Body Fluid Peritoneal Fluid  Gram Stain (16 Jan 2022 12:03):    polymorphonuclear leukocytes seen per low power field    No organisms seen per oil power field    by cytocentrifuge  Preliminary Report (17 Jan 2022 08:37):    No growth    Culture - Blood (collected 16 Jan 2022 07:16)  Source: .Blood Blood-Peripheral  Preliminary Report (17 Jan 2022 08:01):    No growth to date.    Culture - Blood (collected 16 Jan 2022 07:15)  Source: .Blood Blood-Peripheral  Preliminary Report (17 Jan 2022 08:01):    No growth to date.        RADIOLOGY & ADDITIONAL TESTS:  Results Reviewed:   Imaging Personally Reviewed:  Electrocardiogram Personally Reviewed:    COORDINATION OF CARE:  Care Discussed with Consultants/Other Providers [Y/N]:  Prior or Outpatient Records Reviewed [Y/N]:   Emery Cardoso MD  Internal Medicine PGY-1  Pager NS: [phone] // [facility]: [phone]    PROGRESS NOTE:     Patient is a 66y old  Male who presents with a chief complaint of Altered Mental Status (17 Jan 2022 15:44)      SUBJECTIVE / OVERNIGHT EVENTS:    No acute events overnight. Patient examined at bedside, awake and alert and responding to questions appropriately. Denies pain or other symptoms. Able to call family independent o/n yesterday.    REVIEW OF SYSTEMS:    CONSTITUTIONAL: No weakness, fevers or chills  EYES/ENT: No visual changes;  No vertigo or throat pain   NECK: No pain or stiffness  RESPIRATORY: No cough, wheezing, hemoptysis; No shortness of breath  CARDIOVASCULAR: No chest pain or palpitations  GASTROINTESTINAL: No abdominal or epigastric pain. No nausea, vomiting, or hematemesis; No diarrhea or constipation. No melena or hematochezia.  GENITOURINARY: No dysuria, frequency or hematuria  NEUROLOGICAL: No numbness or weakness  SKIN: No itching, rashes      MEDICATIONS  (STANDING):  dextrose 5% + sodium chloride 0.9%. 1000 milliLiter(s) (75 mL/Hr) IV Continuous <Continuous>  enoxaparin Injectable 40 milliGRAM(s) SubCutaneous daily  influenza   Vaccine 0.5 milliLiter(s) IntraMuscular once  pantoprazole  Injectable 40 milliGRAM(s) IV Push daily  piperacillin/tazobactam IVPB.. 3.375 Gram(s) IV Intermittent every 8 hours  sodium bicarbonate 650 milliGRAM(s) Oral three times a day    MEDICATIONS  (PRN):  acetaminophen     Tablet .. 650 milliGRAM(s) Oral every 6 hours PRN Temp greater or equal to 38C (100.4F), Mild Pain (1 - 3)  haloperidol    Injectable 2 milliGRAM(s) IV Push every 6 hours PRN Agitation  melatonin 3 milliGRAM(s) Oral at bedtime PRN Insomnia      CAPILLARY BLOOD GLUCOSE      POCT Blood Glucose.: 104 mg/dL (17 Jan 2022 21:21)  POCT Blood Glucose.: 89 mg/dL (17 Jan 2022 16:55)  POCT Blood Glucose.: 75 mg/dL (17 Jan 2022 12:04)    I&O's Summary    17 Jan 2022 07:01  -  18 Jan 2022 07:00  --------------------------------------------------------  IN: 0 mL / OUT: 150 mL / NET: -150 mL        VITALS:   T(C): 36.7 (01-18-22 @ 06:20), Max: 36.7 (01-18-22 @ 02:03)  HR: 73 (01-18-22 @ 06:20) (73 - 76)  BP: 132/83 (01-18-22 @ 06:20) (112/71 - 134/76)  RR: 18 (01-18-22 @ 06:20) (17 - 18)  SpO2: 100% (01-18-22 @ 06:20) (100% - 100%)    GENERAL: NAD, lying in bed comfortably  HEAD:  Atraumatic, normocephalic  EYES: EOMI, PERRLA, conjunctiva and sclera clear  ENT: Moist mucous membranes  NECK: Supple, no JVD  HEART: Regular rate and rhythm, no murmurs, rubs, or gallops  LUNGS: Unlabored respirations.  Clear to auscultation bilaterally, no crackles, wheezing, or rhonchi  ABDOMEN: Soft, nontender, nondistended, +BS  EXTREMITIES: 2+ peripheral pulses bilaterally. No clubbing, cyanosis, or edema  NERVOUS SYSTEM:  A&Ox3, no focal deficits   SKIN: No rashes or lesions    LABS:                        8.0    6.54  )-----------( 107      ( 17 Jan 2022 07:20 )             23.2     01-17    136  |  110<H>  |  31<H>  ----------------------------<  69<L>  4.3   |  14<L>  |  1.19    Ca    8.3<L>      17 Jan 2022 07:20  Phos  3.9     01-17  Mg     2.10     01-17    TPro  5.6<L>  /  Alb  2.8<L>  /  TBili  1.6<H>  /  DBili  x   /  AST  55<H>  /  ALT  64<H>  /  AlkPhos  756<H>  01-17    PT/INR - ( 16 Jan 2022 10:09 )   PT: 15.5 sec;   INR: 1.37 ratio         PTT - ( 16 Jan 2022 10:09 )  PTT:41.0 sec  CARDIAC MARKERS ( 16 Jan 2022 10:09 )  x     / x     / 56 U/L / x     / x              Culture - Body Fluid with Gram Stain (collected 16 Jan 2022 10:23)  Source: .Body Fluid Peritoneal Fluid  Gram Stain (16 Jan 2022 12:03):    polymorphonuclear leukocytes seen per low power field    No organisms seen per oil power field    by cytocentrifuge  Preliminary Report (17 Jan 2022 08:37):    No growth    Culture - Blood (collected 16 Jan 2022 07:16)  Source: .Blood Blood-Peripheral  Preliminary Report (17 Jan 2022 08:01):    No growth to date.    Culture - Blood (collected 16 Jan 2022 07:15)  Source: .Blood Blood-Peripheral  Preliminary Report (17 Jan 2022 08:01):    No growth to date.        RADIOLOGY & ADDITIONAL TESTS:  Results Reviewed:   Imaging Personally Reviewed:  Electrocardiogram Personally Reviewed:    COORDINATION OF CARE:  Care Discussed with Consultants/Other Providers [Y/N]:  Prior or Outpatient Records Reviewed [Y/N]:

## 2022-01-18 NOTE — PROGRESS NOTE ADULT - PROBLEM SELECTOR PLAN 5
-lobular cystic collection found on R hepatic lobe with associated central biliary duct dilatation.  -possibly infection/abscess vs. intrahepatic biliary radicals vs. cystic mets  -will get MR abdomen for further assessment.  -started zosyn -lobular cystic collection found on R hepatic lobe with associated central biliary duct dilatation.  -possibly infection/abscess vs. intrahepatic biliary radicals vs. cystic mets  -will get MR abdomen for further assessment.  -afebrile, no leukocytosis noted, nontachycardic/tachypneic, zosyn d/c'd.

## 2022-01-18 NOTE — PROGRESS NOTE ADULT - ASSESSMENT
Mr. 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

## 2022-01-18 NOTE — PROGRESS NOTE ADULT - ATTENDING COMMENTS
66M cholangiocarcinoma (s/p resection, chemo, RT, and bile duct/L liver lobe resection + Ju en Y Hepaticojejunostomy), HTN, HLD, BRIAN, and TB as a child who presents with SOB for 3 days and AMS for 1 day  --MS improving, possible COVID encephalopathy; no hypoxia  --appreciate onc recs, MRI abdomen

## 2022-01-18 NOTE — PROVIDER CONTACT NOTE (CRITICAL VALUE NOTIFICATION) - SITUATION
Received critical value results for glucose >605, Potassium 2.8, H&H 5.8/17, bicarb 10. Provider notified.

## 2022-01-18 NOTE — SWALLOW BEDSIDE ASSESSMENT ADULT - COMMENTS
Per progress note 1/18/22, patient is a "66YOM with PMH of cholangiocarcinoma (s/p resection, chemo, RT, and bile duct/L liver lobe resection + Ju en Y Hepaticojejunostomy), HTN, HLD, BRIAN, and TB as a child who presents with SOB for 3 days and AMS for 1 day."    WBC is WFL. Most recent CXR revealed "clear lungs". Most recent CT of head revealed "Motion degraded exam. No gross acute intracranial hemorrhage, vasogenic edema or extra-axial collection.".     Patient seen at bedside this afternoon for an initial assessment of the swallow function, at which time patient was alert and cooperative. Patient able to follow simple directives and communicate wants/needs in basic English. Patient denies pain in throat pre/post today's assessment.

## 2022-01-18 NOTE — PROGRESS NOTE ADULT - PROBLEM SELECTOR PLAN 8
Diet: pending bedside eval  DVT: Recommend lovenox 40 due to cancer  Dispo: pending PT. Diet: regular diet - family will bring him food  DVT: Recommend lovenox 40 due to cancer  Dispo: pending PT.

## 2022-01-18 NOTE — SWALLOW BEDSIDE ASSESSMENT ADULT - ADDITIONAL RECOMMENDATIONS
Monitor for any changes in neurological status that may impact oral intake and reconsult this department for PO tolerance as needed

## 2022-01-18 NOTE — PROGRESS NOTE ADULT - PROBLEM SELECTOR PLAN 3
-per family, pt. has tested positive for COVID for 2-3 weeks as outpatient  -family reports pt. has been completely asymptomatic during that time period, did not receive any steroids or remdesevir.  -Pt. saturating well on RA, will monitor for increasing requirements. -per family, pt. has tested positive for COVID for 2-3 weeks as outpatient  -family reports pt. has been completely asymptomatic during that time period, did not receive any steroids or remdesevir.  -Pt. saturating well on RA, will monitor for increasing requirements.  -eligible for transfer off COVID floor 1/20

## 2022-01-18 NOTE — SWALLOW BEDSIDE ASSESSMENT ADULT - SWALLOW EVAL: DIAGNOSIS
1. Patient demonstrated a functional oral stage for puree, regular solids and thin liquids marked by adequate bolus collection, transfer and posterior transport. 2. Patient demonstrated a functional pharyngeal phase of swallow for puree, regular solids and thin liquids marked by a present pharyngeal swallow trigger with hyolaryngeal elevation noted upon digital palpation without evidence of airway penetration/aspiration.

## 2022-01-18 NOTE — PROGRESS NOTE ADULT - PROBLEM SELECTOR PLAN 1
-pt. with 1 day hx AMS prior to admission, was reportedly eating napkins at dinner per family  -Pt. altered on presentation to ED  -Hyperammonemia vs. COVID  -pt. received lactulose in ED  -pt received haldol and ativan prior to CT scan - patient asleep due to medications  -mental status improved, responsive to verbal stimuli and yes/no questions but still A&Ox0 -pt. with 1 day hx AMS prior to admission, was reportedly eating napkins at dinner per family  -Pt. altered on presentation to ED  -Hyperammonemia vs. COVID  -pt. received lactulose in ED  -pt received haldol and ativan prior to CT scan - patient asleep due to medications  -mental status improved, was able to call family independently overnight

## 2022-01-18 NOTE — SWALLOW BEDSIDE ASSESSMENT ADULT - ASR SWALLOW RECOMMEND DIAG
Objective testing NOT warranted at this time given no concerns for pharyngeal dysphagia at this time.

## 2022-01-18 NOTE — PROGRESS NOTE ADULT - PROBLEM SELECTOR PLAN 2
-pt. temperature 95.8 rectal, 96 on repeat.  -pt. not displaying other sepsis criteria - normal HR, nontachycardic, no leukocytosis.  -etiology possibly neurologic, consider MR head  -bear violettaezioer administered  -Bcx drawn in ED, NGTD  -will give empiric zosyn at this time -pt. temperature 95.8 rectal on admission, now 98, resolved.  -pt. not displaying other sepsis criteria - normal HR, nontachycardic, no leukocytosis.  -Bcx drawn in ED, NGTD  -was prev on zosyn, discontinued after hypothermia improved.

## 2022-01-18 NOTE — PROGRESS NOTE ADULT - SUBJECTIVE AND OBJECTIVE BOX
[facility] [place] [facility]   MD ALEXANDER RUBY MD, PA INJUNG KO NP    TEL:  OFFICE: [phone]  DR MATT CELL: [phone]  DR. HOANG CELL: [phone]  CHIKIS GARNICA CELL: [phone]  GRACIELA SHAH CELL :[phone]    From 5pm-7am Answering Service [phone]    -- RENAL FOLLOW UP NOTE ---Date of Service 01-18-22 @ 18:22    Patient is a 66y old  Male who presents with a chief complaint of Altered Mental Status (18 Jan 2022 07:24)      Patient seen and examined at bedside.     VITALS:  T(F): 97.4 (01-18-22 @ 17:11), Max: 98.1 (01-18-22 @ 06:20)  HR: 75 (01-18-22 @ 17:11)  BP: 136/77 (01-18-22 @ 17:11)  RR: 18 (01-18-22 @ 17:11)  SpO2: 100% (01-18-22 @ 17:11)  Wt(kg): --    01-17 @ 07:01  -  01-18 @ 07:00  --------------------------------------------------------  IN: 0 mL / OUT: 150 mL / NET: -150 mL        Weight (kg): 54 (01-18 @ 06:10)    PHYSICAL EXAM:  Constitutional: NAD  Neck: No JVD  Respiratory: CTAB, no wheezes, rales or rhonchi  Cardiovascular: S1, S2, RRR  Gastrointestinal: BS+, soft, NT/ND  Extremities: No peripheral edema    Hospital Medications:   MEDICATIONS  (STANDING):  enoxaparin Injectable 40 milliGRAM(s) SubCutaneous daily  influenza   Vaccine 0.5 milliLiter(s) IntraMuscular once  pantoprazole  Injectable 40 milliGRAM(s) IV Push daily  sodium bicarbonate 650 milliGRAM(s) Oral three times a day      LABS:  01-18    136  |  108<H>  |  28<H>  ----------------------------<  80  4.0   |  17<L>  |  1.37<H>    Ca    8.0<L>      18 Jan 2022 11:30  Phos  3.8     01-18  Mg     2.00     01-18    TPro  6.1  /  Alb  2.9<L>  /  TBili  2.0<H>  /  DBili      /  AST  43<H>  /  ALT  57<H>  /  AlkPhos  732<H>  01-18    Creatinine Trend: 1.37 <--, 1.05 <--, 1.19 <--, 1.12 <--, 1.13 <--    Albumin, Serum: 2.9 g/dL (01-18 @ 11:30)  Phosphorus Level, Serum: 3.8 mg/dL (01-18 @ 11:30)  Albumin, Serum: 2.0 g/dL (01-18 @ 07:19)  Phosphorus Level, Serum: 2.8 mg/dL (01-18 @ 07:19)                              8.2    5.65  )-----------( 118      ( 18 Jan 2022 11:30 )             23.9     Urine Studies:    Urine Creatinine 81      [01-17-22 @ 20:18]  Urine Urea Nitrogen 749.0      [01-17-22 @ 20:18]  Urine Potassium 73.2      [01-17-22 @ 20:18]  Urine Chloride 43      [01-17-22 @ 20:18]  Urine Osmolality 538      [01-17-22 @ 20:18]    TSH 4.34      [01-16-22 @ 10:09]    HCV 0.35, Nonreact      [01-17-22 @ 09:30]      RADIOLOGY & ADDITIONAL STUDIES:

## 2022-01-19 ENCOUNTER — TRANSCRIPTION ENCOUNTER (OUTPATIENT)
Age: 67
End: 2022-01-19

## 2022-01-19 VITALS
DIASTOLIC BLOOD PRESSURE: 80 MMHG | TEMPERATURE: 99 F | SYSTOLIC BLOOD PRESSURE: 142 MMHG | HEART RATE: 93 BPM | RESPIRATION RATE: 18 BRPM | OXYGEN SATURATION: 100 %

## 2022-01-19 LAB
ALBUMIN SERPL ELPH-MCNC: 2.7 G/DL — LOW (ref 3.3–5)
ALP SERPL-CCNC: 675 U/L — HIGH (ref 40–120)
ALT FLD-CCNC: 53 U/L — HIGH (ref 4–41)
ANION GAP SERPL CALC-SCNC: 10 MMOL/L — SIGNIFICANT CHANGE UP (ref 7–14)
AST SERPL-CCNC: 41 U/L — HIGH (ref 4–40)
BILIRUB SERPL-MCNC: 1.2 MG/DL — SIGNIFICANT CHANGE UP (ref 0.2–1.2)
BUN SERPL-MCNC: 28 MG/DL — HIGH (ref 7–23)
CALCIUM SERPL-MCNC: 8 MG/DL — LOW (ref 8.4–10.5)
CHLORIDE SERPL-SCNC: 110 MMOL/L — HIGH (ref 98–107)
CO2 SERPL-SCNC: 16 MMOL/L — LOW (ref 22–31)
CREAT SERPL-MCNC: 1.38 MG/DL — HIGH (ref 0.5–1.3)
CRP SERPL-MCNC: 9 MG/L — HIGH
D DIMER BLD IA.RAPID-MCNC: 197 NG/ML DDU — SIGNIFICANT CHANGE UP
FERRITIN SERPL-MCNC: 874 NG/ML — HIGH (ref 30–400)
GLUCOSE BLDC GLUCOMTR-MCNC: 113 MG/DL — HIGH (ref 70–99)
GLUCOSE SERPL-MCNC: 130 MG/DL — HIGH (ref 70–99)
HCT VFR BLD CALC: 21.8 % — LOW (ref 39–50)
HGB BLD-MCNC: 7.5 G/DL — LOW (ref 13–17)
LDH SERPL L TO P-CCNC: 159 U/L — SIGNIFICANT CHANGE UP (ref 135–225)
MAGNESIUM SERPL-MCNC: 2 MG/DL — SIGNIFICANT CHANGE UP (ref 1.6–2.6)
MCHC RBC-ENTMCNC: 30.5 PG — SIGNIFICANT CHANGE UP (ref 27–34)
MCHC RBC-ENTMCNC: 34.4 GM/DL — SIGNIFICANT CHANGE UP (ref 32–36)
MCV RBC AUTO: 88.6 FL — SIGNIFICANT CHANGE UP (ref 80–100)
NRBC # BLD: 0 /100 WBCS — SIGNIFICANT CHANGE UP
NRBC # FLD: 0 K/UL — SIGNIFICANT CHANGE UP
PHOSPHATE SERPL-MCNC: 3.4 MG/DL — SIGNIFICANT CHANGE UP (ref 2.5–4.5)
PLATELET # BLD AUTO: 114 K/UL — LOW (ref 150–400)
POTASSIUM SERPL-MCNC: 3.6 MMOL/L — SIGNIFICANT CHANGE UP (ref 3.5–5.3)
POTASSIUM SERPL-SCNC: 3.6 MMOL/L — SIGNIFICANT CHANGE UP (ref 3.5–5.3)
PROCALCITONIN SERPL-MCNC: 0.1 NG/ML — SIGNIFICANT CHANGE UP (ref 0.02–0.1)
PROT SERPL-MCNC: 6 G/DL — SIGNIFICANT CHANGE UP (ref 6–8.3)
RBC # BLD: 2.46 M/UL — LOW (ref 4.2–5.8)
RBC # FLD: 20.5 % — HIGH (ref 10.3–14.5)
SODIUM SERPL-SCNC: 136 MMOL/L — SIGNIFICANT CHANGE UP (ref 135–145)
TSH SERPL-MCNC: 1.93 UIU/ML — SIGNIFICANT CHANGE UP (ref 0.27–4.2)
WBC # BLD: 6.21 K/UL — SIGNIFICANT CHANGE UP (ref 3.8–10.5)
WBC # FLD AUTO: 6.21 K/UL — SIGNIFICANT CHANGE UP (ref 3.8–10.5)

## 2022-01-19 PROCEDURE — 99239 HOSP IP/OBS DSCHRG MGMT >30: CPT | Mod: GC

## 2022-01-19 RX ADMIN — Medication 650 MILLIGRAM(S): at 05:48

## 2022-01-19 RX ADMIN — Medication 650 MILLIGRAM(S): at 13:42

## 2022-01-19 RX ADMIN — PANTOPRAZOLE SODIUM 40 MILLIGRAM(S): 20 TABLET, DELAYED RELEASE ORAL at 13:24

## 2022-01-19 RX ADMIN — ENOXAPARIN SODIUM 40 MILLIGRAM(S): 100 INJECTION SUBCUTANEOUS at 13:24

## 2022-01-19 NOTE — DISCHARGE NOTE PROVIDER - NSDCMRMEDTOKEN_GEN_ALL_CORE_FT
pantoprazole 40 mg oral delayed release tablet: 1 tab(s) orally once a day  Sodium Chloride 1 g oral tablet:

## 2022-01-19 NOTE — PROGRESS NOTE ADULT - PROBLEM SELECTOR PLAN 2
-pt. temperature 95.8 rectal on admission, now 98, resolved.  -pt. not displaying other sepsis criteria - normal HR, nontachycardic, no leukocytosis.  -Bcx drawn in ED, NGTD  -was prev on zosyn, discontinued after hypothermia improved.

## 2022-01-19 NOTE — PROGRESS NOTE ADULT - SUBJECTIVE AND OBJECTIVE BOX
[facility] [place] [facility]   MD ALEXANDER RUBY MD, PA INJUNG KO NP    TEL:  OFFICE: [phone]  DR MATT CELL: [phone]  DR. HOANG CELL: [phone]  CHIKIS GARNICA CELL: [phone]  GRACIELA SHAH CELL :[phone]    From 5pm-7am Answering Service [phone]    -- RENAL FOLLOW UP NOTE ---Date of Service 01-19-22 @ 14:35    Patient is a 66y old  Male who presents with a chief complaint of Altered Mental Status (19 Jan 2022 12:30)      Patient seen and examined at bedside.    VITALS:  T(F): 98.6 (01-19-22 @ 11:00), Max: 98.6 (01-19-22 @ 11:00)  HR: 93 (01-19-22 @ 11:00)  BP: 142/80 (01-19-22 @ 11:00)  RR: 18 (01-19-22 @ 11:00)  SpO2: 100% (01-19-22 @ 11:00)  Wt(kg): --        PHYSICAL EXAM:  Constitutional: NAD  Neck: No JVD  Respiratory: CTAB, no wheezes, rales or rhonchi  Cardiovascular: S1, S2, RRR  Gastrointestinal: BS+, soft, NT/ND  Extremities: No peripheral edema    Hospital Medications:   MEDICATIONS  (STANDING):  enoxaparin Injectable 40 milliGRAM(s) SubCutaneous daily  influenza   Vaccine 0.5 milliLiter(s) IntraMuscular once  pantoprazole  Injectable 40 milliGRAM(s) IV Push daily  sodium bicarbonate 650 milliGRAM(s) Oral three times a day      LABS:  01-19    136  |  110<H>  |  28<H>  ----------------------------<  130<H>  3.6   |  16<L>  |  1.38<H>    Ca    8.0<L>      19 Jan 2022 06:59  Phos  3.4     01-19  Mg     2.00     01-19    TPro  6.0  /  Alb  2.7<L>  /  TBili  1.2  /  DBili      /  AST  41<H>  /  ALT  53<H>  /  AlkPhos  675<H>  01-19    Creatinine Trend: 1.38 <--, 1.37 <--, 1.05 <--, 1.19 <--, 1.12 <--, 1.13 <--    Albumin, Serum: 2.7 g/dL (01-19 @ 06:59)  Phosphorus Level, Serum: 3.4 mg/dL (01-19 @ 06:59)  Ferritin, Serum: 874 ng/mL (01-19 @ 06:59)                              7.5    6.21  )-----------( 114      ( 19 Jan 2022 06:59 )             21.8     Urine Studies:    Urine Creatinine 81      [01-17-22 @ 20:18]  Urine Urea Nitrogen 749.0      [01-17-22 @ 20:18]  Urine Potassium 73.2      [01-17-22 @ 20:18]  Urine Chloride 43      [01-17-22 @ 20:18]  Urine Osmolality 538      [01-17-22 @ 20:18]    Ferritin 874      [01-19-22 @ 06:59]  TSH 1.93      [01-19-22 @ 06:59]    HCV 0.35, Nonreact      [01-17-22 @ 09:30]      RADIOLOGY & ADDITIONAL STUDIES:

## 2022-01-19 NOTE — PROGRESS NOTE ADULT - PROBLEM SELECTOR PLAN 6
-Ammonia level 80 on admission, decreased to 36 after admin of rectal lactulose  -will f/u mental status, hyperammonemia may have contributed to pt's symptoms prior to admission  -etiology likely 2/2 patient's cholangiocarcinoma.

## 2022-01-19 NOTE — PROGRESS NOTE ADULT - PROBLEM SELECTOR PLAN 3
-per family, pt. has tested positive for COVID for 2-3 weeks as outpatient  -family reports pt. has been completely asymptomatic during that time period, did not receive any steroids or remdesevir.  -Pt. saturating well on RA, will monitor for increasing requirements.  -eligible for transfer off COVID floor 1/20

## 2022-01-19 NOTE — PROGRESS NOTE ADULT - PROBLEM SELECTOR PLAN 8
Diet: regular diet - family will bring him food  DVT: Recommend lovenox 40 due to cancer  Dispo: pending PT. Diet: regular diet - family will bring him food  DVT: Recommend lovenox 40 due to cancer  Dispo: home. Diet: regular diet - family will bring him food  DVT: Recommend lovenox 40 due to cancer  Dispo: home.  Comms: Spoke to daughter, recommended pt. f/u with hepatologist outpatient for further workup of liver mass and ammonia levels. Cleared for d/c

## 2022-01-19 NOTE — PROGRESS NOTE ADULT - PROBLEM SELECTOR PLAN 5
-lobular cystic collection found on R hepatic lobe with associated central biliary duct dilatation.  -possibly infection/abscess vs. intrahepatic biliary radicals vs. cystic mets  -will get MR abdomen for further assessment.  -afebrile, no leukocytosis noted, nontachycardic/tachypneic, zosyn d/c'd. -lobular cystic collection found on R hepatic lobe with associated central biliary duct dilatation.  -possibly infection/abscess vs. intrahepatic biliary radicals vs. cystic mets  -MR abdomen showing cystic mass, abscess vs biloma, however no infectious symptoms. recommend f/u with hepatology outpt.  -afebrile, no leukocytosis noted, nontachycardic/tachypneic, zosyn d/c'd.

## 2022-01-19 NOTE — DISCHARGE NOTE NURSING/CASE MANAGEMENT/SOCIAL WORK - PATIENT PORTAL LINK FT
You can access the FollowMyHealth Patient Portal offered by [facility] by registering at the following website: [email or web address]. By joining [employer]’s FollowMyHealth portal, you will also be able to view your health information using other applications (apps) compatible with our system.

## 2022-01-19 NOTE — DISCHARGE NOTE PROVIDER - HOSPITAL COURSE
HPI:  Mr. Riddle is a 66YOM with PMH of cholangiocarcinoma (s/p resection, chemo, RT, and bile duct/L liver lobe resection + Ju en Y Hepaticojejunostomy), HTN, HLD, BRIAN, and TB as a child who presents with SOB for 3 days and AMS for 1 day. Per the patient's daughter who is a nurse and HCP alongside her brother, the patient had recently been hospitalized at [facility] for sepsis from 12/7-12/21 and finished a course of IV ceftriaxone at home without complication. He was previously on eliquis that was held during that hospitalization. He had tested positive for COVID after his discharge but never had any symptoms or treatment and was A&Ox4 at baseline. On Thursday 1/13/21 pt. went for therapeutic paracentesis that was completed without incident. The following day, the patient was noted to have some difficulty breathing after eating dinner, thought to be due to GERD symptoms. His family gave him pantoprazole and his symptoms improved, however the following day 1/15, the patient reported not feeling like himself with continued difficulty breathing. He went to an urgent care center and was recommended to go to a hospital for workup of a possible PE, however pt. refused to go and went home instead. Later that evening he was found to be confused at dinner time where per family he was eating dinner but also ripping up his napkins and attempting to eat them. He was answering questions appropriately at the time and was still alert and oriented, however his family took him to the ED.     In the ED the patient was found to by hypothermic with an elevated ammonia level to 80 and was given lactulose enema. He was agitated and received 5mg haldol and 2mg ativan prior to CT imaging of his head and abdomen. Imaging was notable for lobular cystic collection on R hepatic lobe with further MRI imaging recommended. No PE was found on chest CT. He underwent 50cc paracentesis and 1L fluid bolus was given in addition to 1g ceftriaxone. Prior to medication administration pt. was complaining of SOB, abdominal pain, and had altered mental status. (16 Jan 2022 12:03)   HPI:  Mr. Riddle is a 66YOM with PMH of cholangiocarcinoma (s/p resection, chemo, RT, and bile duct/L liver lobe resection + Ju en Y Hepaticojejunostomy), HTN, HLD, BRIAN, and TB as a child who presents with SOB for 3 days and AMS for 1 day. Per the patient's daughter who is a nurse and HCP alongside her brother, the patient had recently been hospitalized at [facility] for sepsis from 12/7-12/21 and finished a course of IV ceftriaxone at home without complication. He was previously on eliquis that was held during that hospitalization. He had tested positive for COVID after his discharge but never had any symptoms or treatment and was A&Ox4 at baseline. On Thursday 1/13/21 pt. went for therapeutic paracentesis that was completed without incident. The following day, the patient was noted to have some difficulty breathing after eating dinner, thought to be due to GERD symptoms. His family gave him pantoprazole and his symptoms improved, however the following day 1/15, the patient reported not feeling like himself with continued difficulty breathing. He went to an urgent care center and was recommended to go to a hospital for workup of a possible PE, however pt. refused to go and went home instead. Later that evening he was found to be confused at dinner time where per family he was eating dinner but also ripping up his napkins and attempting to eat them. He was answering questions appropriately at the time and was still alert and oriented, however his family took him to the ED.     In the ED the patient was found to by hypothermic with an elevated ammonia level to 80 and was given lactulose enema. He was agitated and received 5mg haldol and 2mg ativan prior to CT imaging of his head and abdomen. Imaging was notable for lobular cystic collection on R hepatic lobe with further MRI imaging recommended. No PE was found on chest CT. He underwent 50cc paracentesis and 1L fluid bolus was given in addition to 1g ceftriaxone. He was initially started on Zosyn for possible liver abscess or intra-abdominal infection. His temperature stabilized and he did not have any further fever, leukocytosis, tachycardia or tachypnea and the antibiotics were held. Prior to medication administration pt. was complaining of SOB, abdominal pain, and had altered mental status. His ammonia level improved after the lactulose enema and his mental status improved over the next two days after admission. He was found to have asymmetrical edema of one leg so venous duplex was ordered that was negative for DVT. With the improvement in his mental status, he completed a speech and swallow evaluation and was able to tolerate a regular diet. He was able to work with PT who recommended home w/o further needs.

## 2022-01-19 NOTE — DISCHARGE NOTE PROVIDER - NSDCCPCAREPLAN_GEN_ALL_CORE_FT
PRINCIPAL DISCHARGE DIAGNOSIS  Diagnosis: Altered mental status  Assessment and Plan of Treatment: You came into the hospital because your family noticed you weren't acting like yourself by trying to eat the napkins at dinner. You were taken to the hospital and were found to be agitated when undergoing CT exam so you were given ativan and haldol. You ammonia level was also found to be high on admission so you were given a dose of lactulose that brought your ammonia down to a normal level. Your mental status improved over the course of your admission and you were fully alert and oriented two days after admission and able to speak to your family.      SECONDARY DISCHARGE DIAGNOSES  Diagnosis: Hypothermia not due to cold exposure  Assessment and Plan of Treatment: Your temperature was found to be 96 when you came to the ED. You were started on an antibiotic, Zosyn, out of concern for a possible abdominal infection. You were also given a warming blanket to help raise your temperature. Your temperature increased and you had no other signs of infection so the antibiotics were stopped.    Diagnosis: 2019 novel coronavirus disease (COVID-19)  Assessment and Plan of Treatment: Per your family you had tested positive for COVID for several weeks prior to admission. You had no respiratory symptoms during your admission and you were eligble to be moved off COVID precautions on 1/20.    Diagnosis: Liver mass  Assessment and Plan of Treatment: You were found to have a liver mass on CT scan. There was concern given your symptoms it was a liver abscess/infection but you had no other clinical signs of infection and your blood cultures were negative. MRCP was done to further image your liver that showed a cystic mass that could either be a benign biloma or a liver abscess, but given you have not had any fever and negative blood cultures our suspicion for an abscess is low. We recommend following up with a hepatologist for follow up and to return to the hospital if you begin to have any abdominal pain with fever.

## 2022-01-19 NOTE — DISCHARGE NOTE PROVIDER - CARE PROVIDER_API CALL
[facility]  [address] [id number]  Phone: [phone]  Fax: [phone]  Follow Up Time:     Oz Reed)  Gastroenterology; Internal Medicine  [address] [place] [address]  Phone: [phone]  Fax: [phone]  Follow Up Time:    [facility]  [address] [place] [address]  Phone: [phone]  Fax: [phone]  Follow Up Time:     Oz Reed)  Gastroenterology; Internal Medicine  [address] [place] [address]  Phone: [phone]  Fax: [phone]  Follow Up Time:     Aaron Squires)  Internal Medicine  [address] [place] [address]  Phone: [phone]  Fax: [phone]  Follow Up Time:

## 2022-01-19 NOTE — PROGRESS NOTE ADULT - SUBJECTIVE AND OBJECTIVE BOX
Emery Cardoso MD  Internal Medicine PGY-1  Pager NS: [phone] // [facility]: [phone]    PROGRESS NOTE:     Patient is a 66y old  Male who presents with a chief complaint of Altered Mental Status (18 Jan 2022 18:21)      SUBJECTIVE / OVERNIGHT EVENTS:    No acute events overnight. Patient examined at bedside with no acute complaints.     REVIEW OF SYSTEMS:    CONSTITUTIONAL: No weakness, fevers or chills  EYES/ENT: No visual changes;  No vertigo or throat pain   NECK: No pain or stiffness  RESPIRATORY: No cough, wheezing, hemoptysis; No shortness of breath  CARDIOVASCULAR: No chest pain or palpitations  GASTROINTESTINAL: No abdominal or epigastric pain. No nausea, vomiting, or hematemesis; No diarrhea or constipation. No melena or hematochezia.  GENITOURINARY: No dysuria, frequency or hematuria  NEUROLOGICAL: No numbness or weakness  SKIN: No itching, rashes      MEDICATIONS  (STANDING):  enoxaparin Injectable 40 milliGRAM(s) SubCutaneous daily  influenza   Vaccine 0.5 milliLiter(s) IntraMuscular once  pantoprazole  Injectable 40 milliGRAM(s) IV Push daily  sodium bicarbonate 650 milliGRAM(s) Oral three times a day    MEDICATIONS  (PRN):  acetaminophen     Tablet .. 650 milliGRAM(s) Oral every 6 hours PRN Temp greater or equal to 38C (100.4F), Mild Pain (1 - 3)  haloperidol    Injectable 2 milliGRAM(s) IV Push every 6 hours PRN Agitation  melatonin 3 milliGRAM(s) Oral at bedtime PRN Insomnia      CAPILLARY BLOOD GLUCOSE      POCT Blood Glucose.: 156 mg/dL (18 Jan 2022 21:29)  POCT Blood Glucose.: 110 mg/dL (18 Jan 2022 17:07)  POCT Blood Glucose.: 74 mg/dL (18 Jan 2022 12:01)  POCT Blood Glucose.: 77 mg/dL (18 Jan 2022 12:00)  POCT Blood Glucose.: 92 mg/dL (18 Jan 2022 09:01)    I&O's Summary      VITALS:   T(C): 36.6 (01-19-22 @ 05:01), Max: 36.6 (01-18-22 @ 23:28)  HR: 89 (01-19-22 @ 05:01) (75 - 89)  BP: 117/77 (01-19-22 @ 05:01) (117/77 - 137/75)  RR: 18 (01-19-22 @ 05:01) (18 - 18)  SpO2: 100% (01-19-22 @ 05:01) (97% - 100%)    GENERAL: NAD, lying in bed comfortably  HEAD:  Atraumatic, normocephalic  EYES: EOMI, PERRLA, conjunctiva and sclera clear  ENT: Moist mucous membranes  NECK: Supple, no JVD  HEART: Regular rate and rhythm, no murmurs, rubs, or gallops  LUNGS: Unlabored respirations.  Clear to auscultation bilaterally, no crackles, wheezing, or rhonchi  ABDOMEN: Soft, nontender, nondistended, +BS  EXTREMITIES: 2+ peripheral pulses bilaterally. No clubbing, cyanosis, or edema  NERVOUS SYSTEM:  A&Ox3, no focal deficits   SKIN: No rashes or lesions    LABS:                        7.5    6.21  )-----------( 114      ( 19 Jan 2022 06:59 )             21.8     01-19    136  |  110<H>  |  28<H>  ----------------------------<  130<H>  3.6   |  16<L>  |  1.38<H>    Ca    8.0<L>      19 Jan 2022 06:59  Phos  3.4     01-19  Mg     2.00     01-19    TPro  6.0  /  Alb  2.7<L>  /  TBili  1.2  /  DBili  x   /  AST  41<H>  /  ALT  53<H>  /  AlkPhos  675<H>  01-19              Culture - Body Fluid with Gram Stain (collected 16 Jan 2022 10:23)  Source: .Body Fluid Peritoneal Fluid  Gram Stain (16 Jan 2022 12:03):    polymorphonuclear leukocytes seen per low power field    No organisms seen per oil power field    by cytocentrifuge  Preliminary Report (17 Jan 2022 08:37):    No growth        RADIOLOGY & ADDITIONAL TESTS:  Results Reviewed:   Imaging Personally Reviewed:  Electrocardiogram Personally Reviewed:    COORDINATION OF CARE:  Care Discussed with Consultants/Other Providers [Y/N]:  Prior or Outpatient Records Reviewed [Y/N]:   Emery Cardoso MD  Internal Medicine PGY-1  Pager NS: [phone] // [facility]: [phone]    PROGRESS NOTE:     Patient is a 66y old  Male who presents with a chief complaint of Altered Mental Status (18 Jan 2022 18:21)      SUBJECTIVE / OVERNIGHT EVENTS:    No acute events overnight. Patient examined at bedside with no acute complaints, able to work with PT this AM without difficulty.    REVIEW OF SYSTEMS:    CONSTITUTIONAL: No weakness, fevers or chills  EYES/ENT: No visual changes;  No vertigo or throat pain   NECK: No pain or stiffness  RESPIRATORY: No cough, wheezing, hemoptysis; No shortness of breath  CARDIOVASCULAR: No chest pain or palpitations  GASTROINTESTINAL: No abdominal or epigastric pain. No nausea, vomiting, or hematemesis; No diarrhea or constipation. No melena or hematochezia.  GENITOURINARY: No dysuria, frequency or hematuria  NEUROLOGICAL: No numbness or weakness  SKIN: No itching, rashes      MEDICATIONS  (STANDING):  enoxaparin Injectable 40 milliGRAM(s) SubCutaneous daily  influenza   Vaccine 0.5 milliLiter(s) IntraMuscular once  pantoprazole  Injectable 40 milliGRAM(s) IV Push daily  sodium bicarbonate 650 milliGRAM(s) Oral three times a day    MEDICATIONS  (PRN):  acetaminophen     Tablet .. 650 milliGRAM(s) Oral every 6 hours PRN Temp greater or equal to 38C (100.4F), Mild Pain (1 - 3)  haloperidol    Injectable 2 milliGRAM(s) IV Push every 6 hours PRN Agitation  melatonin 3 milliGRAM(s) Oral at bedtime PRN Insomnia      CAPILLARY BLOOD GLUCOSE      POCT Blood Glucose.: 156 mg/dL (18 Jan 2022 21:29)  POCT Blood Glucose.: 110 mg/dL (18 Jan 2022 17:07)  POCT Blood Glucose.: 74 mg/dL (18 Jan 2022 12:01)  POCT Blood Glucose.: 77 mg/dL (18 Jan 2022 12:00)  POCT Blood Glucose.: 92 mg/dL (18 Jan 2022 09:01)    I&O's Summary      VITALS:   T(C): 36.6 (01-19-22 @ 05:01), Max: 36.6 (01-18-22 @ 23:28)  HR: 89 (01-19-22 @ 05:01) (75 - 89)  BP: 117/77 (01-19-22 @ 05:01) (117/77 - 137/75)  RR: 18 (01-19-22 @ 05:01) (18 - 18)  SpO2: 100% (01-19-22 @ 05:01) (97% - 100%)    GENERAL: NAD, lying in bed comfortably  HEAD:  Atraumatic, normocephalic  EYES: EOMI, PERRLA, conjunctiva and sclera clear  ENT: Moist mucous membranes  NECK: Supple, no JVD  HEART: Regular rate and rhythm, no murmurs, rubs, or gallops  LUNGS: Unlabored respirations.  Clear to auscultation bilaterally, no crackles, wheezing, or rhonchi  ABDOMEN: Soft, nontender, nondistended, +BS  EXTREMITIES: 2+ peripheral pulses bilaterally. No clubbing, cyanosis, or edema  NERVOUS SYSTEM:  A&Ox3, no focal deficits   SKIN: No rashes or lesions    LABS:                        7.5    6.21  )-----------( 114      ( 19 Jan 2022 06:59 )             21.8     01-19    136  |  110<H>  |  28<H>  ----------------------------<  130<H>  3.6   |  16<L>  |  1.38<H>    Ca    8.0<L>      19 Jan 2022 06:59  Phos  3.4     01-19  Mg     2.00     01-19    TPro  6.0  /  Alb  2.7<L>  /  TBili  1.2  /  DBili  x   /  AST  41<H>  /  ALT  53<H>  /  AlkPhos  675<H>  01-19              Culture - Body Fluid with Gram Stain (collected 16 Jan 2022 10:23)  Source: .Body Fluid Peritoneal Fluid  Gram Stain (16 Jan 2022 12:03):    polymorphonuclear leukocytes seen per low power field    No organisms seen per oil power field    by cytocentrifuge  Preliminary Report (17 Jan 2022 08:37):    No growth        RADIOLOGY & ADDITIONAL TESTS:  Results Reviewed:   Imaging Personally Reviewed:  Electrocardiogram Personally Reviewed:    COORDINATION OF CARE:  Care Discussed with Consultants/Other Providers [Y/N]:  Prior or Outpatient Records Reviewed [Y/N]:

## 2022-01-19 NOTE — PROGRESS NOTE ADULT - ASSESSMENT
6YOM with PMH of cholangiocarcinoma (s/p resection, chemo, RT, and bile duct/L liver lobe resection + Ju en Y Hepaticojejunostomy), HTN, HLD, BRIAN, and TB as a child who admitted with SOB for 3 days and AMS for 1 day.  Found to be COVID positive but on RA.     REGINA   SCr elevated   etiology?      Hyponatremia   possibly hypervolemic in the setting of ascites vs. Hypovolemia  in the setting of poor oral intake. vs SIADH  Serum sodium improved today.  TSH slightly high.  cortisol is normal   monitor BMP     Acidosis   without anion gap  improving   on sodium bicarb 650mg tid     HTN  acceptable   monitor on current meds  6YOM with PMH of cholangiocarcinoma (s/p resection, chemo, RT, and bile duct/L liver lobe resection + Ju en Y Hepaticojejunostomy), HTN, HLD, BRIAN, and TB as a child who admitted with SOB for 3 days and AMS for 1 day.  Found to be COVID positive but on RA.     REGINA   SCr elevated   etiology?  get urine Na, Urine cr, cbc with differential   check renal ultrasound       Hyponatremia   possibly hypervolemic in the setting of ascites vs. Hypovolemia  in the setting of poor oral intake. vs SIADH  Serum sodium improved today.  TSH slightly high.  cortisol is normal   monitor BMP     Acidosis   without anion gap  improving   on sodium bicarb 650mg tid     HTN  acceptable   monitor on current meds  6YOM with PMH of cholangiocarcinoma (s/p resection, chemo, RT, and bile duct/L liver lobe resection + Ju en Y Hepaticojejunostomy), HTN, HLD, BRIAN, and TB as a child who admitted with SOB for 3 days and AMS for 1 day.  Found to be COVID positive but on RA.     REGINA   SCr elevated   etiology?  get urine Na, Urine cr, cbc with differential , UA  check renal ultrasound       Hyponatremia   possibly hypervolemic in the setting of ascites vs. Hypovolemia  in the setting of poor oral intake. vs SIADH  Serum sodium improved today.  TSH slightly high.  cortisol is normal   monitor BMP     Acidosis   without anion gap  improving   on sodium bicarb 650mg tid     HTN  acceptable   monitor on current meds  6YOM with PMH of cholangiocarcinoma (s/p resection, chemo, RT, and bile duct/L liver lobe resection + Ju en Y Hepaticojejunostomy), HTN, HLD, BRIAN, and TB as a child who admitted with SOB for 3 days and AMS for 1 day.  Found to be COVID positive but on RA.     REGINA   SCr elevated   etiology?  get urine Na, Urine cr, cbc with differential , UA  check renal ultrasound   MOnitor BMP  Avoid nephrotoxics, NSAID RCA      Hyponatremia   possibly hypervolemic in the setting of ascites   in the setting of poor oral intake. vs SIADH  Serum sodium improved today.  TSH slightly high.  cortisol is normal   monitor BMP     Acidosis   without anion gap  improving   on sodium bicarb 650mg tid     HTN  acceptable   monitor on current meds

## 2022-01-19 NOTE — DISCHARGE NOTE PROVIDER - NSDCCPTREATMENT_GEN_ALL_CORE_FT
PRINCIPAL PROCEDURE  Procedure: MRCP (magnetic resonance cholangiopancreatography)  Findings and Treatment: ACC: [id number] EXAM:  MR MRCP WAW IC                        PROCEDURE DATE:  01/18/2022    FINDINGS:  Motion degraded exam.  LOWER CHEST: Partially imaged right pericardial fluid collection. Small   bilateral pleural effusions.  LIVER: Status post left hepatectomy. There is a 10.0 x 8.0 x 9.8 cm T2   hyperintense, T1 hypointense, complex cystic lesion in the right hepatic   lobe with rim enhancement. An additional rim-enhancing cystic lesion of   1.5 cm posteriorly at the dome (14, 20). No associated restricted   diffusion.  BILE DUCTS: Increased intrahepatic ductal dilatation to the level of the   xavier hepatis where there is abnormal soft tissue, extending from the   retroperitoneum. Associated restricted diffusion.  GALLBLADDER: Cholecystectomy.  SPLEEN: Within normal limits.  PANCREAS: Within normal limits.  ADRENALS: Within normal limits.  KIDNEYS/URETERS: Within normal limits.  VISUALIZED PORTIONS:  BOWEL: Gastric distention with intraluminal air and debris.   Hepaticojejunostomy with distal anastomosis in the anterior mid abdomen.  PERITONEUM: Moderate to large ascites.  VESSELS: Upper abdominal varices. Chronic occlusion of the portal vein.  RETROPERITONEUM/LYMPH NODES: Heterogeneous mass in the retroperitoneum is   again noted. Marked associated narrowing of the celiac axis, SMA from    This mass demonstrates restricted diffusion.  ABDOMINAL WALL: Anasarca. Partially imaged fluid collection in the right   gluteal region is unchanged.  BONES: Degenerative changes.  IMPRESSION:  Increased intrahepatic ductal dilatation to the level of the xavier   hepatis, likely secondary to extension of the known retroperitoneal mass.  A large complex cystic lesion in the right hepatic lobe is indeterminate   and may represent an abscess, in the right clinical setting. A biloma is   also possible. The other much smaller cystic lesion in the hepatic dome   may represent a metastatic lesion.  Moderate to large ascites.

## 2022-01-19 NOTE — PROGRESS NOTE ADULT - PROBLEM SELECTOR PLAN 4
-asymmetric edema noted  -will order venous duplex to r/o DVT

## 2022-01-19 NOTE — DISCHARGE NOTE PROVIDER - NSDCFUSCHEDAPPT_GEN_ALL_CORE_FT
JESUS WALLACE ; 01/20/2022 ; NP Rad  Opd JESUS Ramon ; 01/27/2022 ; [facility]  Opd JESUS Ramon ; 02/03/2022 ; [facility]  Opd [place]

## 2022-01-19 NOTE — DISCHARGE NOTE NURSING/CASE MANAGEMENT/SOCIAL WORK - NSDCPEFALRISK_GEN_ALL_CORE
For information on Fall & Injury Prevention, visit: [email or web address] OR  [email or web address] OR  https://www.cdc.gov/steadi/patient.html

## 2022-01-19 NOTE — HISTORY OF PRESENT ILLNESS
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 His history dates back 6 weeks ago, December 2015, when he noted the onset of severe itching. He was seen in the emergency room on December 23 with jaundice and CAT scan revealed marked intrahepatic biliary ductal dilatation to the level of the biliary confluence. The CAT scan of the chest revealed a 6 mm linear opacity in the right upper lobe. MRI of the abdomen confirmed a biliary ductal dilatation to the level of the biliary hilum with an area of delayed enhancement measuring 3 x 1.9 cm. Thrombosis of the left portal vein was seen. 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 The patient underwent stent placement which improved his itching and jaundice. Cytology was obtained which was consistent with carcinoma.\par \par On January 5, 2016,  the patient underwent partial hepatectomy including the left lobe and  gallbladder, removal of the extrahepatic bile duct, hepaticojejunostomy to the Ju-en-Y, reconstruction of the portal vein with saphenous vein patch. The pathology revealed moderate to poorly differentiated cholangiocarcinoma involving the perihilar bile ducts. This tumor extended to the wall of the portal vein. The hepatic margin of resection was negative. 2 lymph nodes were negative. The bladder was nonmalignant. The tumor extended into the adjacent hepatic parenchyma, into the surrounding adipose tissue and extended into the interlobular bile ducts with extensive perineural invasion. The bile duct margins were negative. The tumor measured 2.9 x 2.4 cm. Staging was T3 N0.\par \par Patient did well postoperatively. Overall he lost about 20 pounds but now his appetite is improving and he feels stronger. His wound is healing well and he denies nausea, vomiting or diarrhea. There is no previous history of prior liver disease, gallbladder disease or GI disease. There is no family yesterday of malignancy. The patient works as a [employer] . [de-identified] : poorly diff cholangioca [de-identified] : Since the last visit the patient has been hospitalized for the development of ascites requiring paracentesis.  Once he undergoes paracentesis he feels comfortable and is able to eat.

## 2022-01-19 NOTE — DISCHARGE NOTE PROVIDER - CARE PROVIDERS DIRECT ADDRESSES
,DirectAddress_Unknown,[email or web address] ,[email or web address]_Unknown,[email or web address]

## 2022-01-19 NOTE — PROGRESS NOTE ADULT - PROBLEM SELECTOR PLAN 1
-pt. with 1 day hx AMS prior to admission, was reportedly eating napkins at dinner per family  -Pt. altered on presentation to ED  -Hyperammonemia vs. COVID  -pt. received lactulose in ED  -pt received haldol and ativan prior to CT scan - patient asleep due to medications  -mental status improved, was able to call family independently overnight -pt. with 1 day hx AMS prior to admission, was reportedly eating napkins at dinner per family  -Pt. altered on presentation to ED  -Hyperammonemia vs. COVID  -pt. received lactulose in ED  -pt received haldol and ativan prior to CT scan - patient asleep due to medications  -mental status improved, was able to call family independently overnight  -able to work with PT without difficulty

## 2022-01-19 NOTE — PHYSICAL THERAPY INITIAL EVALUATION ADULT - GAIT DEVIATIONS NOTED, PT EVAL
decreased lottie/increased time in double stance/decreased velocity of limb motion/decreased weight-shifting ability

## 2022-01-19 NOTE — PROGRESS NOTE ADULT - PROBLEM SELECTOR PROBLEM 2
Hypothermia not due to cold exposure

## 2022-01-19 NOTE — PROGRESS NOTE ADULT - PROBLEM SELECTOR PLAN 7
-pt has known Hx cholangiocarcinoma s/p resection, chemo, RT  -will get f/u imaging to determine progression that would explain hyperammonemia and search for mets. -pt has known Hx cholangiocarcinoma s/p resection, chemo, RT  -will get f/u imaging to determine progression that would explain hyperammonemia and search for mets.  -f/u with onc (pieter) and hepatology.

## 2022-01-19 NOTE — PROGRESS NOTE ADULT - ATTENDING COMMENTS
66M cholangiocarcinoma (s/p resection, chemo, RT, and bile duct/L liver lobe resection + Ju en Y Hepaticojejunostomy), HTN, HLD, BRIAN, and TB as a child who presents with SOB for 3 days and AMS for 1 day  --MS returned to baseline, possible COVID encephalopathy; no hypoxia  --appreciate onc recs, f/u MRI abdomen  possible d/c home today  d/c time 40 min coordinating care

## 2022-01-19 NOTE — ASSESSMENT
[FreeTextEntry1] : A discussion took place with the patient and his family regarding further management.  We recommended that he continue to schedule weekly paracentesis appointments since the ascites may be ongoing for period of time.  We also discussed the possibility of beginning treatment with chemotherapy but the patient still is very weak and we will attempt to increase his caloric intake in order to improve his ability to function.  We had also previously planned on doing local radiation therapy.  We will be evaluating his liver function to see if there is progression of disease. [Palliative] : Goals of care discussed with patient: Palliative [Palliative Care Plan] : not applicable at this time

## 2022-01-19 NOTE — REASON FOR VISIT
[Home] : at home, [unfilled] , at the time of the visit. [Medical Office: ([facility])___] : at the medical office located in  [Family Member] : family member

## 2022-01-20 ENCOUNTER — APPOINTMENT (OUTPATIENT)
Dept: ULTRASOUND IMAGING | Facility: IMAGING CENTER | Age: 67
End: 2022-01-20
Payer: MEDICAID

## 2022-01-20 ENCOUNTER — RESULT REVIEW (OUTPATIENT)
Age: 67
End: 2022-01-20

## 2022-01-20 ENCOUNTER — OUTPATIENT (OUTPATIENT)
Dept: OUTPATIENT SERVICES | Facility: HOSPITAL | Age: 67
LOS: 1 days | End: 2022-01-20
Payer: MEDICAID

## 2022-01-20 DIAGNOSIS — C22.1 INTRAHEPATIC BILE DUCT CARCINOMA: ICD-10-CM

## 2022-01-20 DIAGNOSIS — Z98.890 OTHER SPECIFIED POSTPROCEDURAL STATES: Chronic | ICD-10-CM

## 2022-01-20 DIAGNOSIS — Z00.8 ENCOUNTER FOR OTHER GENERAL EXAMINATION: ICD-10-CM

## 2022-01-20 PROBLEM — I81 PORTAL VEIN THROMBOSIS: Chronic | Status: ACTIVE | Noted: 2022-01-16

## 2022-01-20 PROCEDURE — 49083 ABD PARACENTESIS W/IMAGING: CPT

## 2022-01-21 LAB
CULTURE RESULTS: SIGNIFICANT CHANGE UP
SPECIMEN SOURCE: SIGNIFICANT CHANGE UP

## 2022-01-24 ENCOUNTER — INPATIENT (INPATIENT)
Facility: HOSPITAL | Age: 67
LOS: 2 days | Discharge: HOME HEALTH | End: 2022-01-27
Attending: STUDENT IN AN ORGANIZED HEALTH CARE EDUCATION/TRAINING PROGRAM | Admitting: STUDENT IN AN ORGANIZED HEALTH CARE EDUCATION/TRAINING PROGRAM
Payer: MEDICAID

## 2022-01-24 VITALS
DIASTOLIC BLOOD PRESSURE: 86 MMHG | SYSTOLIC BLOOD PRESSURE: 130 MMHG | OXYGEN SATURATION: 99 % | HEART RATE: 112 BPM | TEMPERATURE: 97 F | RESPIRATION RATE: 18 BRPM | HEIGHT: 64 IN

## 2022-01-24 DIAGNOSIS — Z98.890 OTHER SPECIFIED POSTPROCEDURAL STATES: Chronic | ICD-10-CM

## 2022-01-24 DIAGNOSIS — T59.891A TOXIC EFFECT OF OTHER SPECIFIED GASES, FUMES AND VAPORS, ACCIDENTAL (UNINTENTIONAL), INITIAL ENCOUNTER: ICD-10-CM

## 2022-01-24 LAB
ALBUMIN SERPL ELPH-MCNC: 3.4 G/DL — SIGNIFICANT CHANGE UP (ref 3.3–5)
ALP SERPL-CCNC: 731 U/L — HIGH (ref 40–120)
ALT FLD-CCNC: 54 U/L — HIGH (ref 4–41)
AMMONIA BLD-MCNC: 65 UMOL/L — HIGH (ref 11–55)
ANION GAP SERPL CALC-SCNC: 12 MMOL/L — SIGNIFICANT CHANGE UP (ref 7–14)
APTT BLD: 35.9 SEC — SIGNIFICANT CHANGE UP (ref 27–36.3)
AST SERPL-CCNC: 60 U/L — HIGH (ref 4–40)
BASE EXCESS BLDV CALC-SCNC: -7.7 MMOL/L — LOW (ref -2–3)
BASOPHILS # BLD AUTO: 0.03 K/UL — SIGNIFICANT CHANGE UP (ref 0–0.2)
BASOPHILS NFR BLD AUTO: 0.5 % — SIGNIFICANT CHANGE UP (ref 0–2)
BILIRUB SERPL-MCNC: 1.4 MG/DL — HIGH (ref 0.2–1.2)
BLOOD GAS VENOUS COMPREHENSIVE RESULT: SIGNIFICANT CHANGE UP
BUN SERPL-MCNC: 29 MG/DL — HIGH (ref 7–23)
CALCIUM SERPL-MCNC: 8.5 MG/DL — SIGNIFICANT CHANGE UP (ref 8.4–10.5)
CHLORIDE BLDV-SCNC: 108 MMOL/L — SIGNIFICANT CHANGE UP (ref 96–108)
CHLORIDE SERPL-SCNC: 107 MMOL/L — SIGNIFICANT CHANGE UP (ref 98–107)
CO2 BLDV-SCNC: 17.1 MMOL/L — LOW (ref 22–26)
CO2 SERPL-SCNC: 15 MMOL/L — LOW (ref 22–31)
CREAT SERPL-MCNC: 1.05 MG/DL — SIGNIFICANT CHANGE UP (ref 0.5–1.3)
EOSINOPHIL # BLD AUTO: 0.28 K/UL — SIGNIFICANT CHANGE UP (ref 0–0.5)
EOSINOPHIL NFR BLD AUTO: 4.6 % — SIGNIFICANT CHANGE UP (ref 0–6)
GAS PNL BLDV: 133 MMOL/L — LOW (ref 136–145)
GLUCOSE BLDV-MCNC: 99 MG/DL — SIGNIFICANT CHANGE UP (ref 70–99)
GLUCOSE SERPL-MCNC: 104 MG/DL — HIGH (ref 70–99)
HCO3 BLDV-SCNC: 16 MMOL/L — LOW (ref 22–29)
HCT VFR BLD CALC: 26.1 % — LOW (ref 39–50)
HCT VFR BLDA CALC: 26 % — LOW (ref 39–51)
HGB BLD CALC-MCNC: 8.6 G/DL — LOW (ref 13–17)
HGB BLD-MCNC: 8.9 G/DL — LOW (ref 13–17)
IANC: 4.13 K/UL — SIGNIFICANT CHANGE UP (ref 1.5–8.5)
IMM GRANULOCYTES NFR BLD AUTO: 0.7 % — SIGNIFICANT CHANGE UP (ref 0–1.5)
INR BLD: 1.32 RATIO — HIGH (ref 0.88–1.16)
LACTATE BLDV-MCNC: 2.1 MMOL/L — HIGH (ref 0.5–2)
LYMPHOCYTES # BLD AUTO: 0.99 K/UL — LOW (ref 1–3.3)
LYMPHOCYTES # BLD AUTO: 16.2 % — SIGNIFICANT CHANGE UP (ref 13–44)
MAGNESIUM SERPL-MCNC: 2.2 MG/DL — SIGNIFICANT CHANGE UP (ref 1.6–2.6)
MCHC RBC-ENTMCNC: 30 PG — SIGNIFICANT CHANGE UP (ref 27–34)
MCHC RBC-ENTMCNC: 34.1 GM/DL — SIGNIFICANT CHANGE UP (ref 32–36)
MCV RBC AUTO: 87.9 FL — SIGNIFICANT CHANGE UP (ref 80–100)
MONOCYTES # BLD AUTO: 0.66 K/UL — SIGNIFICANT CHANGE UP (ref 0–0.9)
MONOCYTES NFR BLD AUTO: 10.8 % — SIGNIFICANT CHANGE UP (ref 2–14)
NEUTROPHILS # BLD AUTO: 4.13 K/UL — SIGNIFICANT CHANGE UP (ref 1.8–7.4)
NEUTROPHILS NFR BLD AUTO: 67.2 % — SIGNIFICANT CHANGE UP (ref 43–77)
NRBC # BLD: 0 /100 WBCS — SIGNIFICANT CHANGE UP
NRBC # FLD: 0 K/UL — SIGNIFICANT CHANGE UP
PCO2 BLDV: 27 MMHG — LOW (ref 42–55)
PH BLDV: 7.39 — SIGNIFICANT CHANGE UP (ref 7.32–7.43)
PLATELET # BLD AUTO: 133 K/UL — LOW (ref 150–400)
PO2 BLDV: 30 MMHG — SIGNIFICANT CHANGE UP
POTASSIUM BLDV-SCNC: 4.7 MMOL/L — SIGNIFICANT CHANGE UP (ref 3.5–5.1)
POTASSIUM SERPL-MCNC: 3.9 MMOL/L — SIGNIFICANT CHANGE UP (ref 3.5–5.3)
POTASSIUM SERPL-MCNC: 5.2 MMOL/L — SIGNIFICANT CHANGE UP (ref 3.5–5.3)
POTASSIUM SERPL-SCNC: 3.9 MMOL/L — SIGNIFICANT CHANGE UP (ref 3.5–5.3)
POTASSIUM SERPL-SCNC: 5.2 MMOL/L — SIGNIFICANT CHANGE UP (ref 3.5–5.3)
PROT SERPL-MCNC: 6.9 G/DL — SIGNIFICANT CHANGE UP (ref 6–8.3)
PROTHROM AB SERPL-ACNC: 14.9 SEC — HIGH (ref 10.6–13.6)
RBC # BLD: 2.97 M/UL — LOW (ref 4.2–5.8)
RBC # FLD: 21 % — HIGH (ref 10.3–14.5)
SAO2 % BLDV: 38.8 % — SIGNIFICANT CHANGE UP
SARS-COV-2 RNA SPEC QL NAA+PROBE: DETECTED
SODIUM SERPL-SCNC: 134 MMOL/L — LOW (ref 135–145)
TROPONIN T, HIGH SENSITIVITY RESULT: 24 NG/L — SIGNIFICANT CHANGE UP
TROPONIN T, HIGH SENSITIVITY RESULT: 25 NG/L — SIGNIFICANT CHANGE UP
WBC # BLD: 6.13 K/UL — SIGNIFICANT CHANGE UP (ref 3.8–10.5)
WBC # FLD AUTO: 6.13 K/UL — SIGNIFICANT CHANGE UP (ref 3.8–10.5)

## 2022-01-24 PROCEDURE — 71045 X-RAY EXAM CHEST 1 VIEW: CPT | Mod: 26

## 2022-01-24 PROCEDURE — 70450 CT HEAD/BRAIN W/O DYE: CPT | Mod: 26,MA

## 2022-01-24 PROCEDURE — 99285 EMERGENCY DEPT VISIT HI MDM: CPT

## 2022-01-24 RX ORDER — LACTULOSE 10 G/15ML
200 SOLUTION ORAL ONCE
Refills: 0 | Status: COMPLETED | OUTPATIENT
Start: 2022-01-24 | End: 2022-01-24

## 2022-01-24 RX ADMIN — LACTULOSE 200 GRAM(S): 10 SOLUTION ORAL at 14:25

## 2022-01-24 NOTE — ED PROVIDER NOTE - OBJECTIVE STATEMENT
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 Daughter reports 2 days of intermittent confusion. Reports pt not making sense, trying to eat napkins, similar to previous admission. States he was mumbling words, but that resolved. Pt reported rt scapula pain early this morning. Has not had any fever, chills, cough, vomiting, diarrhea, abd pain, dysuria. Had therapeutic paracentesis out patient 3 days ago at 3600 cc fluid was removed. Pt is due for another one 1/27/22. Patient was also found to have portal vein thrombus during previous admission, AC was held due to GI bleed. Pt no longer on AC. Cholangiocarcinoma originally diagnosed in 2016, s/p resection, chemo, RT, not currently on treatment. Per MR performed during admission, pt has retroperitoneal mass with cystic lesion in liver. Pt lives at home with family. Daughter at bedside who is a nurse.

## 2022-01-24 NOTE — ED PROVIDER NOTE - PHYSICAL EXAMINATION
General: appears chronically ill, no acute distress, AOx3  Skin: no rash, no pallor  Head: normocephalic, atraumatic  Eyes: clear conjunctiva, EOMI  ENMT: airway patent, no nasal discharge  Cardiovascular: normal rate, normal rhythm, S1/S2  Pulmonary: clear to auscultation bilaterally, no rales, rhonchi, or wheeze  Abdomen: soft, fluid wave, nontender, no guarding   Musculoskeletal: moving extremities well, no deformity  Psych: normal mood, normal affect General: appears chronically ill, no acute distress, AOx3  Skin: no rash, no pallor  Head: normocephalic, atraumatic  Eyes: clear conjunctiva, EOMI  ENMT: airway patent, no nasal discharge  Cardiovascular: normal rate, normal rhythm, S1/S2  Pulmonary: clear to auscultation bilaterally, no rales, rhonchi, or wheeze  Abdomen: soft, fluid wave, nontender, no guarding   Musculoskeletal: moving extremities well, no deformity, LLE>RLE, baseline  Psych: normal mood, normal affect

## 2022-01-24 NOTE — ED ADULT NURSE NOTE - OBJECTIVE STATEMENT
received pt in [facility], 66 yr/o male A+Ox3, confused to situation, ambulatory at baseline, daughter at bedside. PMH of liver CA, and recently treated for Sepsis. presented to the ED with daughter stating pt is having increased confusion. daughter states that pt experienced same symptoms when he was septic the first time, as per daughter pt was having difficultly finding words, "he did know that a plate was a plate." onset was last night, came to the ED today because forgetfulness was persistent. VSS, denies chest pain and SOB, RR even and unlabored. acitise noted abdomen is distended, firm, and tender, pts last paracentesis was 1/20. skin is clean dry and intact. labs drawn. will continue to monitor. received pt in [facility], 66 yr/o male A+Ox3, confused to situation, ambulatory at baseline, daughter at bedside. PMH of bile duct CA s/p resection and chemo, and recently treated for Sepsis. presented to the ED with daughter stating pt is having increased confusion. daughter states that pt experienced same symptoms when he was septic the first time, as per daughter pt was having difficultly finding words, "he did know that a plate was a plate." onset was last night, came to the ED today because forgetfulness was persistent. VSS, denies chest pain and SOB, RR even and unlabored. acitise noted abdomen is distended, firm, and tender, pts last paracentesis was 1/20. skin is clean dry and intact. labs drawn. will continue to monitor.

## 2022-01-24 NOTE — H&P ADULT - PROBLEM SELECTOR PLAN 3
-lobular cystic collection found on R hepatic lobe with associated central biliary duct dilatation.  -possibly infection/abscess vs. intrahepatic biliary radicals vs. cystic mets  -MR abdomen showing cystic mass, abscess vs biloma, however no infectious symptoms  -pt has outpatient hepatology appt for 3/27

## 2022-01-24 NOTE — ED PROVIDER NOTE - ATTENDING CONTRIBUTION TO CARE
Attending note:   After face to face evaluation of this patient, I concur with above noted hx, pe, and care plan for this patient.  Thompson: 66 yom with recent sepsis and hepatic encephalopathy. Pt noted by daughter to be eating paper towel last night and more confused over two days. Pt was back to baseline when discharged few days ago when admitted with similar presentation. No fever, no sob, pt had 4L drained 4 days ago. Pt is thin frail appearing, cachetic, clear lungs, RRR, abd soft with fluid wave but no tn, not distended, left leg edema more than right (chronic as per daughter), non tender.   Pt requires assistance to sit up in bed.   Labs to check electrolytes and ammonia, rectal temp although based on sxs and vitals does not appear septic.

## 2022-01-24 NOTE — ED ADULT NURSE NOTE - NSIMPLEMENTINTERV_GEN_ALL_ED
Implemented All Fall with Harm Risk Interventions:  [facility] to call system. Call bell, personal items and telephone within reach. Instruct patient to call for assistance. Room bathroom lighting operational. Non-slip footwear when patient is off stretcher. Physically safe environment: no spills, clutter or unnecessary equipment. Stretcher in lowest position, wheels locked, appropriate side rails in place. Provide visual cue, wrist band, yellow gown, etc. Monitor gait and stability. Monitor for mental status changes and reorient to person, place, and time. Review medications for side effects contributing to fall risk. Reinforce activity limits and safety measures with patient and family. Provide visual clues: red socks.

## 2022-01-24 NOTE — H&P ADULT - PROBLEM SELECTOR PLAN 1
-Suspect hepatic encephalopathy given elevated ammonia and improvement in mental status after lactulose enema  -cause of hyperammonemia likely underlying cholangiocarcinoma  -unlikely structural given negative CT head. Unlikely infectious, no infectious sxs, fevers, or leukocytosis.   -Pt mental status now significantly improved, back at baseline    Plan:  -check TSH, B12, RPR ammonia  -standing lactulose, titrate to 2-3 BMs/day -Suspect hepatic encephalopathy given elevated ammonia and improvement in mental status after lactulose enema  -cause of hyperammonemia likely underlying cholangiocarcinoma  -unlikely structural given negative CT head. Unlikely infectious, no infectious sxs, fevers, or leukocytosis.   -Pt mental status now significantly improved, back at baseline    Plan:  -check TSH, B12, RPR ammonia  -standing lactulose 10g qd to start, titrate to 2-3 BMs/day -Suspect hepatic encephalopathy given elevated ammonia (Ammonia = 65) and improvement in mental status after lactulose enema  -cause of hyperammonemia likely underlying cholangiocarcinoma  -unlikely structural given negative CT head. Unlikely infectious, no infectious sxs, fevers, or leukocytosis.   -Pt mental status now significantly improved, back at baseline    Plan:  -check TSH, B12, RPR ammonia  -standing lactulose 10g qd to start, titrate to 2-3 BMs/day

## 2022-01-24 NOTE — ED PROVIDER NOTE - PROGRESS NOTE DETAILS
Alvin Bar, PGY-2- Updated daughter on results of work up. Ammonia elevated to 60s. Will give lacutlose. Will also obtain CT head. All questions answered. Said she feels comfortable bringing pt home if work up unremarkable otherwise, but would like lactulose to go home with as it has been hard to get a sooner hepatology appt. Will discuss after work up Mello, pGY2: received care upon signout; patient w/ cholangiocarcnioma coming in w/ AMS. Patient received lactulose enema and awaiting CTr; discussed w/ GI that patient can possible be discharged w/ oral lactulose. Mello, PGY2: Discussed w/ daughter and patient the need to monitor patient while on oral lactulose and offered admission to hospital. Patient + daughter understand and agree w/ plan.

## 2022-01-24 NOTE — CONSULT NOTE ADULT - SUBJECTIVE AND OBJECTIVE BOX
[facility]   MD ALEXANDER RUBY MD, PA INJUNG KO NP    TEL:  OFFICE: [phone]  DR MATT CELL: [phone]  DR. HOANG CELL: [phone]  CHIKIS GARNICA CELL: [phone]  GRACIELA SHAH CELL :[phone]  From 5pm-7am answering service [phone]    --- INITIAL RENAL CONSULT NOTE ---date of service 01-24-22 @ 15:07    HPI:  66 year old male with a pmhx of cholangiocarcinoma (s/p resection, chemo, RT, and bile duct/L liver lobe resection + Ju en Y Hepaticojejunostomy), HTN, HLD, BRIAN, and TB as a child, recent admission for altered mental status, thought to be attributed to encephalopathy 2/2 ammonia, is brought to ED by daughter for evaluation of altered mental status. Consulted nephro for hyponatremia     Allergies:  No Known Allergies      PAST MEDICAL & SURGICAL HISTORY:  Liver mass    TB (tuberculosis)  age 17    HTN (hypertension)    Hypercholesterolemia    BRIAN (obstructive sleep apnea)    Cholangiocarcinoma  diagnosed 2015    Portal vein thrombosis  9/2021    History of abdominal surgery  h/o bile duct and left lobe of liver resected and Ju En Y hepaticojejunostomy    History of liver biopsy  9/2021        Home Medications Reviewed    Hospital Medications:   MEDICATIONS  (STANDING):      SOCIAL HISTORY:  Denies ETOh, Smoking,     FAMILY HISTORY:  Family history of diabetes mellitus (Father)        REVIEW OF SYSTEMS:  not following comments     VITALS:  T(F): 97.3 (01-24-22 @ 11:42), Max: 97.3 (01-24-22 @ 11:42)  HR: 81 (01-24-22 @ 11:42)  BP: 140/80 (01-24-22 @ 11:42)  RR: 22 (01-24-22 @ 11:42)  SpO2: 100% (01-24-22 @ 11:42)  Wt(kg): --    Height (cm): 162.6 (01-24 @ 10:17)    PHYSICAL EXAM:  Constitutional: NAD  HEENT: anicteric sclera, oropharynx clear, MMM  Neck: No JVD  Respiratory: CTAB, no wheezes, rales or rhonchi  Cardiovascular: S1, S2, RRR  Gastrointestinal: BS+, soft, NT/ND ascites   Extremities: No cyanosis or clubbing. No peripheral edema  Neurological: confused   Psychiatric: unable to assess   : No CVA tenderness. No jenkins.   Skin: No rashes    LABS:  01-24    134<L>  |  107  |  29<H>  ----------------------------<  104<H>  5.2   |  15<L>  |  1.05    Ca    8.5      24 Jan 2022 11:26  Mg     2.20     01-24    TPro  6.9  /  Alb  3.4  /  TBili  1.4<H>  /  DBili      /  AST  60<H>  /  ALT  54<H>  /  AlkPhos  731<H>  01-24    Creatinine Trend: 1.05 <--, 1.38 <--, 1.37 <--, 1.05 <--                        8.9    6.13  )-----------( 133      ( 24 Jan 2022 11:26 )             26.1     Urine Studies:    Osmolality, Random Urine: 538 mosm/kg (01-17 @ 20:18)  Creatinine, Random Urine: 81 mg/dL (01-17 @ 20:18)  Chloride, Random Urine: 43 mmol/L (01-17 @ 20:18)  Potassium, Random Urine: 73.2 mmol/L (01-17 @ 20:18)      RADIOLOGY & ADDITIONAL STUDIES:                   [facility] [place] [facility]   MD ALEXANDER RUBY MD, PA INJUNG KO NP    TEL:  OFFICE: [phone]  DR MATT CELL: [phone]  DR. HOANG CELL: [phone]  CHIKIS GARNICA CELL: [phone]  GRACIELA SHAH CELL :[phone]  From 5pm-7am answering service [phone]    --- INITIAL RENAL CONSULT NOTE ---date of service 01-24-22 @ 16:28    HPI: 66 year old male with a pmhx of cholangiocarcinoma (s/p resection, chemo, RT, and bile duct/L liver lobe resection + Ju en Y Hepaticojejunostomy), HTN, HLD, BRIAN, and TB as a child, recent admission for altered mental status, thought to be attributed to encephalopathy 2/2 ammonia, is brought to ED by daughter for evaluation of altered mental status. Consulted nephro for hyponatremia     Allergies:  No Known Allergies      PAST MEDICAL & SURGICAL HISTORY:  Liver mass    TB (tuberculosis)  age 17    HTN (hypertension)    Hypercholesterolemia    BRIAN (obstructive sleep apnea)    Cholangiocarcinoma  diagnosed 2015    Portal vein thrombosis  9/2021    History of abdominal surgery  h/o bile duct and left lobe of liver resected and Ju En Y hepaticojejunostomy    History of liver biopsy  9/2021        Home Medications Reviewed    Hospital Medications:   MEDICATIONS  (STANDING):      SOCIAL HISTORY:  Denies ETOh, Smoking,     FAMILY HISTORY:  Family history of diabetes mellitus (Father)        REVIEW OF SYSTEMS:  CONSTITUTIONAL: No weakness, fevers or chills  EYES/ENT: No visual changes;  No vertigo or throat pain   NECK: No pain or stiffness  RESPIRATORY: No cough, wheezing, hemoptysis; No shortness of breath  CARDIOVASCULAR: No chest pain or palpitations.  GASTROINTESTINAL: No abdominal or epigastric pain. No nausea, vomiting, or hematemesis; No diarrhea or constipation. No melena or hematochezia.  GENITOURINARY: No dysuria, frequency, foamy urine, urinary urgency, incontinence or hematuria  NEUROLOGICAL: No numbness or weakness  SKIN: No itching, burning, rashes, or lesions   VASCULAR: No bilateral lower extremity edema.   All other review of systems is negative unless indicated above.    VITALS:  T(F): 97.3 (01-24-22 @ 11:42), Max: 97.3 (01-24-22 @ 11:42)  HR: 81 (01-24-22 @ 11:42)  BP: 140/80 (01-24-22 @ 11:42)  RR: 22 (01-24-22 @ 11:42)  SpO2: 100% (01-24-22 @ 11:42)  Wt(kg): --    Height (cm): 162.6 (01-24 @ 10:17)    PHYSICAL EXAM:  Constitutional: NAD  HEENT: anicteric sclera, oropharynx clear, MMM  Neck: No JVD  Respiratory: CTAB, no wheezes, rales or rhonchi  Cardiovascular: S1, S2, RRR  Gastrointestinal: BS+, soft, NT/ND, ascites   Extremities: No cyanosis or clubbing. No peripheral edema  Neurological: A/O x 3, no focal deficits  Psychiatric: Normal mood, normal affect  : No CVA tenderness. No jenkins.   Skin: No rashes      LABS:  01-24    134<L>  |  107  |  29<H>  ----------------------------<  104<H>  5.2   |  15<L>  |  1.05    Ca    8.5      24 Jan 2022 11:26  Mg     2.20     01-24    TPro  6.9  /  Alb  3.4  /  TBili  1.4<H>  /  DBili      /  AST  60<H>  /  ALT  54<H>  /  AlkPhos  731<H>  01-24    Creatinine Trend: 1.05 <--, 1.38 <--, 1.37 <--, 1.05 <--                        8.9    6.13  )-----------( 133      ( 24 Jan 2022 11:26 )             26.1     Urine Studies:    Osmolality, Random Urine: 538 mosm/kg (01-17 @ 20:18)  Creatinine, Random Urine: 81 mg/dL (01-17 @ 20:18)  Chloride, Random Urine: 43 mmol/L (01-17 @ 20:18)  Potassium, Random Urine: 73.2 mmol/L (01-17 @ 20:18)      RADIOLOGY & ADDITIONAL STUDIES:

## 2022-01-24 NOTE — ED PROVIDER NOTE - NS ED ROS FT
General: no fever, no chills, +altered mental status   Eyes: no vision changes, no eye pain  Cardiovascular: no chest pain, no edema  Respiratory: no cough, no shortness of breath  Gastrointestinal: no abdominal pain, no nausea, no vomiting, no diarrhea  Genitourinary: no dysuria, no hematuria  Musculoskeletal: no muscle pain, no joint pain  Skin: no rash, no lesions  Neuro: no numbness, no tingling  Psych: no depression, no anxiety

## 2022-01-24 NOTE — H&P ADULT - PROBLEM SELECTOR PLAN 2
-chronic portal vein thrombosis off AC given GI bleed -mild hypoNa to 134, present since 1/16  -low po intake vs siADH vs liver etiology  -neph consulted, appreciate recs  -pt is on salt tabs at home, holding for now  -neph recommending sodium bicarb 650 TID

## 2022-01-24 NOTE — H&P ADULT - NSHPREVIEWOFSYSTEMS_GEN_ALL_CORE
Unable to assess ROS given poor pt mental status In additional the that documented in the HPI, the additional ROS was obtained:    CONSTITUTIONAL: No fever, no chills  EYES: no change in vision, no blurred vision  HEENT: no trouble swallowing, no trouble speaking, no sore throat  CV: no chest pain, no palpitations  RESP: no cough, no shortness of breath  GI: no abdominal pain, no nausea, no vomiting, no diarrhea, no constipation  : No dysuria, no frequency  NEURO: no headache, no new numbness, no weakness, no dizziness  SKIN: no new rashes  HEME: No easy bruising or bleeding  MSK: no recent trauma In additional the that documented in the HPI, the additional ROS was obtained:    CONSTITUTIONAL: No fever, no chills  EYES: no change in vision, no blurred vision  HEENT: no trouble swallowing, no trouble speaking, no sore throat  CV: no chest pain, no palpitations  RESP: no cough, no shortness of breath  GI: no abdominal pain, no nausea, no vomiting, no diarrhea, no constipation  : No dysuria, no frequency  NEURO: no headache, no new numbness, no weakness, no dizziness  SKIN: no new rashes,   HEME: No easy bruising or bleeding  MSK: no recent trauma

## 2022-01-24 NOTE — CONSULT NOTE ADULT - ASSESSMENT
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 Consulted nephro for hyponatremia     A/P    Hyponatremia   etiology?  possibly hypervolemic in the setting of ascites   in the setting of poor oral intake. vs SIADH  TSH slightly high.  cortisol is normal     Hyperkalemia   hemolyzed     Acidosis   without anion gap  resume sodium bicarb 650mg tid     HTN  optimal   monitor  66 year old male with a pmhx of cholangiocarcinoma (s/p resection, chemo, RT, and bile duct/L liver lobe resection + Ju en Y Hepaticojejunostomy), HTN, HLD, BRIAN, and TB as a child, recent admission for altered mental status, thought to be attributed to encephalopathy 2/2 ammonia, is brought to ED by daughter for evaluation of altered mental status. Consulted nephro for hyponatremia     A/P    Hyponatremia   etiology?  possibly hypervolemic in the setting of ascites   in the setting of poor oral intake. vs SIADH  TSH slightly high.  cortisol is normal   monitor at present     Hyperkalemia   hemolyzed     Acidosis   without anion gap  resume sodium bicarb 650mg tid     HTN  optimal   monitor

## 2022-01-24 NOTE — H&P ADULT - NSHPPHYSICALEXAM_GEN_ALL_CORE
Vital Signs Last 24 Hrs  T(C): 36.5 (24 Jan 2022 20:53), Max: 36.5 (24 Jan 2022 20:53)  T(F): 97.7 (24 Jan 2022 20:53), Max: 97.7 (24 Jan 2022 20:53)  HR: 77 (24 Jan 2022 23:14) (77 - 112)  BP: 136/78 (24 Jan 2022 23:14) (130/86 - 152/87)  BP(mean): --  RR: 20 (24 Jan 2022 23:14) (18 - 26)  SpO2: 100% (24 Jan 2022 23:14) (99% - 100%)    CONSTITUTIONAL: NAD, well-developed, well-groomed  EYES: PERRLA; conjunctiva and sclera clear  ENMT: Moist oral mucosa, no pharyngeal injection or exudates; normal dentition  NECK: Supple, no palpable masses; no thyromegaly  RESPIRATORY: Normal respiratory effort; lungs are clear to auscultation bilaterally  CARDIOVASCULAR: Regular rate and rhythm, normal S1 and S2, no murmur/rub/gallop; 2+ LE edema L>R.   ABDOMEN: Nontender to palpation, distended with fluid wave.  MUSCULOSKELETAL:  Normal gait; no clubbing or cyanosis of digits; no joint swelling or tenderness to palpation  PSYCH: A+O to person, place, and time; Christiano speaking, following commands.  NEUROLOGY: CN 2-12 are intact and symmetric; no gross sensory deficits. Moves all extremities. Negative milk maids sign. Subtle, trace asterixis.   SKIN: No rashes; no palpable lesions Vital Signs Last 24 Hrs  T(C): 36.5 (24 Jan 2022 20:53), Max: 36.5 (24 Jan 2022 20:53)  T(F): 97.7 (24 Jan 2022 20:53), Max: 97.7 (24 Jan 2022 20:53)  HR: 77 (24 Jan 2022 23:14) (77 - 112)  BP: 136/78 (24 Jan 2022 23:14) (130/86 - 152/87)  BP(mean): --  RR: 20 (24 Jan 2022 23:14) (18 - 26)  SpO2: 100% (24 Jan 2022 23:14) (99% - 100%)    CONSTITUTIONAL: NAD, well-developed, well-groomed  EYES: PERRL; conjunctiva and sclera clear  ENMT: Moist oral mucosa, no pharyngeal injection or exudates; normal dentition  NECK: Supple, no palpable masses; no thyromegaly  RESPIRATORY: Normal respiratory effort; lungs are clear to auscultation bilaterally  CARDIOVASCULAR: Regular rate and rhythm, normal S1 and S2, no murmur/rub/gallop; 2+ LE edema L>R.   ABDOMEN: Nontender to palpation, distended with fluid wave.  MUSCULOSKELETAL:  Normal gait; no clubbing or cyanosis of digits; no joint swelling or tenderness to palpation  PSYCH: A+O to person, place, and time; Christiano speaking, following commands.  NEUROLOGY: CN 2-12 are intact and symmetric; no gross sensory deficits. Moves all extremities. Negative milk maids sign. Subtle, trace asterixis.   SKIN: No rashes; no palpable lesions

## 2022-01-24 NOTE — H&P ADULT - NSHPSOCIALHISTORY_GEN_ALL_CORE
Lives at home with family. Ambulates with rolling walker and family member assistance. Has home PT ordered. Lives at home with family.   Ambulates with rolling walker and family member assistance. Has home PT ordered.

## 2022-01-24 NOTE — ED PROVIDER NOTE - CLINICAL SUMMARY MEDICAL DECISION MAKING FREE TEXT BOX
Alvin Bar, PGY-2- 66 year old male with a complex pmhx, including cholangiocarcinoma s/p chemo/rt/leon-en-y in 2016, recent admission with AMS, s/p lactulose in ED last time, with improvement during admission. Pt previously admitted with sepsis in December to OSH. On arrival, pt AOx3, mental status seemed to have improved from home. Afebrile rectally. Plan to obtain cbc, cmp, trp, blood cx, ammonia level, blood gas with lactate, cxr, ua, ucxr, ekg. Plan to reassess, possible CTH. Suspect metabolic encephalopathy. Will reeval, possibly admit. R/o sepsis, metabolic derangement, acs

## 2022-01-24 NOTE — H&P ADULT - ASSESSMENT
66M of cholangiocarcinoma (s/p resection, chemo and bile duct/L liver lobe resection + Ju en Y Hepaticojejunostomy currently off treatment), ascites receiving weekly therapeutic paracentesis (last done 1/20/2022), portal vein thrombosis off AC (due to GIB), HTN, HLD, BRIAN, and TB as a child p/w AMS x2d and elevated ammonia likely 2/2 hepatic encephalopathy. Now back at baseline mental status s/p lactulose enema. [ x ]  Lab studies reviewed  [ x ]  Radiology reviewed  [ x ]  Old records reviewed    66M of cholangiocarcinoma (s/p resection, chemo and bile duct/L liver lobe resection + Ju en Y Hepaticojejunostomy currently off treatment), ascites receiving weekly therapeutic paracentesis (last done 1/20/2022), portal vein thrombosis off AC (due to GIB), HTN, HLD, BRIAN, and TB as a child p/w AMS x2d and elevated ammonia likely 2/2 hepatic encephalopathy. Now back at baseline mental status s/p lactulose enema.

## 2022-01-24 NOTE — H&P ADULT - PROBLEM SELECTOR PLAN 5
-asymmetric 2+ leg edema L>R, per daughter this is baseline since pt had a vein surgery in L leg  -Duplex US 1/18/2022 negative for DVTs -asymmetric 2+ leg edema L>R, per daughter this is baseline since pt had a vein surgery in L leg  -Duplex US 1/18/2022 negative for DVTs  -Would benefit from compression stocking

## 2022-01-24 NOTE — H&P ADULT - NSHPLABSRESULTS_GEN_ALL_CORE
8.9    6.13  )-----------( 133      ( 24 Jan 2022 11:26 )             26.1       01-24    x   |  x   |  x   ----------------------------<  x   3.9   |  x   |  x     Ca    8.5      24 Jan 2022 11:26  Mg     2.20     01-24    TPro  6.9  /  Alb  3.4  /  TBili  1.4<H>  /  DBili  x   /  AST  60<H>  /  ALT  54<H>  /  AlkPhos  731<H>  01-24                  PT/INR - ( 24 Jan 2022 11:26 )   PT: 14.9 sec;   INR: 1.32 ratio         PTT - ( 24 Jan 2022 11:26 )  PTT:35.9 sec    Lactate Trend            CAPILLARY BLOOD GLUCOSE      POCT Blood Glucose.: 107 mg/dL (24 Jan 2022 10:15)        Culture Results:   No growth at 5 days (01-16 @ 05:15)  Culture Results:   No Growth Final (01-16 @ 01:00)  Culture Results:   No Growth Final (01-16 @ 01:00)        Cultures: BCx, UCx pending    Radiology:  < from: CT Head No Cont (01.24.22 @ 14:10) >    No hydrocephalus, acute intracranial hemorrhage, mass effect, or brain   edema.    < end of copied text >    < from: Xray Chest 1 View AP/PA (01.24.22 @ 11:48) >    The lungs are clear. No pleural effusion or pneumothorax.  Embolization coils in the right upper quadrant.  Normal cardiomediastinal silhouette.  No acute bony pathology.    < end of copied text >    < from: MR MRCP w/wo IV Cont (01.18.22 @ 19:58) >    Increased intrahepatic ductal dilatation to the level of the xavier   hepatis, likely secondary to extension of the known retroperitoneal mass.  A large complex cystic lesion in the right hepatic lobe is indeterminate   and may represent an abscess, in the right clinical setting. A biloma is   also possible. The other much smaller cystic lesion in the hepatic dome   may represent a metastatic lesion.  Moderate to large ascites.    < end of copied text >        EKG:   NSR, QTc 480

## 2022-01-24 NOTE — H&P ADULT - NSICDXPASTMEDICALHX_GEN_ALL_CORE_FT
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

## 2022-01-24 NOTE — H&P ADULT - ATTENDING COMMENTS
66 year old female, with past history as noted above, with significance for cholangiocarcinoma and encephalopathy due to elevated ammonia level) being manage/evaluated for altered mental status.  Mentation abnormality for the past ~ 2 days.  Ammonia level elevated at 65; treated w/ lactulose 100 gm rectally x one in the ED.  Mentation improving.  Should continue w/ lactulose to facility bowel movements 2 to 3 times daily and f/u ammonia level.  Hyponatremia (sodium = 134) likely multifactorial (dilutional, SIADH...)  Patient although COVID-19 positive, is asymptomatic; supportive care, as needed.  Continue AC for Portal vein thrombosis.    All other management as prescribed.

## 2022-01-24 NOTE — ED ADULT TRIAGE NOTE - CHIEF COMPLAINT QUOTE
pt brought in by daughter for change in mental status . pt was recently treated for sepsis, hypothermia  d/cd 1/19. pt  with liver cancer. ascites noted.  finger stick 109. Pt with HX of covid end of december.

## 2022-01-24 NOTE — H&P ADULT - PROBLEM SELECTOR PLAN 8
Diet: no beef, DASH  DVT ppx: lovenox 40mg     -Pt already has home PT set up, rolling walker at home    GOC: FULL CODE, daughter is HCP Diet: no beef, DASH  DVT ppx: lovenox 40mg     [ ] PT eval ordered, can dc if recent PT eval 1/19 recommending home PT is sufficient per hospital protocol  -Pt already has home PT set up, rolling walker at home    Dispo: anticipate discharge home 1/25 as pt now mentating at baseline after receiving lactulose  GOC: FULL CODE, daughter is HCP Diet: no beef, DASH  DVT ppx: Lovenox 40mg     [ ] PT eval ordered, can dc if recent PT eval 1/19 recommending home PT is sufficient per hospital protocol  -Pt already has home PT set up, rolling walker at home    Dispo: anticipate discharge home 1/25 as pt now mentating at baseline after receiving lactulose  GOC: FULL CODE, daughter is HCP

## 2022-01-24 NOTE — H&P ADULT - PROBLEM SELECTOR PLAN 4
-Klatskin's tumor dx'd in 2015, s/p partial hepatectomy including the left lobe and gallbladder, removal of the extrahepatic bile duct, hepaticojejunostomy to the Ju-en-Y, reconstruction of the portal vein with saphenous vein patch (2016)  -intraabdominal recurrence was given gemcitabine/abraxane/RT but currently off of treatment  -seen by onc consults 1/16, recommended MRI but no plans for inpatient treatment  -MRCP 1/18 showing liver lesion ?metastasis  -outpatient onc follow-up -Klatskin's tumor dx'd in 2015, s/p partial hepatectomy including the left lobe and gallbladder, removal of the extrahepatic bile duct, hepaticojejunostomy to the Ju-en-Y, reconstruction of the portal vein with saphenous vein patch (2016)  -intraabdominal recurrence was given gemcitabine/abraxane/RT but currently off of treatment  -seen by onc consults 1/16, recommended MRI but no plans for inpatient treatment  -MRCP 1/18 showing liver lesion ?metastasis  -follow with outpatient onc Dr. Andrew Mosquera, last seen 1/19.

## 2022-01-24 NOTE — H&P ADULT - NSICDXPASTSURGICALHX_GEN_ALL_CORE_FT
PAST SURGICAL HISTORY:  History of abdominal surgery h/o bile duct and left lobe of liver resected and Ju En Y hepaticojejunostomy    History of liver biopsy 9/2021     PAST SURGICAL HISTORY:  History of abdominal paracentesis     History of abdominal surgery h/o bile duct and left lobe of liver resected and Ju En Y hepaticojejunostomy    History of liver biopsy 9/2021

## 2022-01-24 NOTE — H&P ADULT - HISTORY OF PRESENT ILLNESS
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 Daughter reports 2 days of intermittent confusion. Reports pt not making sense, trying to eat napkins, similar to previous admission. States he was mumbling words, but that resolved. Pt reported rt scapula pain early this morning. Has not had any fever, chills, cough, vomiting, diarrhea, abd pain, dysuria. Had therapeutic paracentesis out patient 3 days ago at 3600 cc fluid was removed. Pt is due for another one 1/27/22. Patient was also found to have portal vein thrombus during previous admission, AC was held due to GI bleed. Pt no longer on AC. Cholangiocarcinoma originally diagnosed in 2016, s/p resection, chemo, RT, not currently on treatment. Per MR performed during admission, pt has retroperitoneal mass with cystic lesion in liver. Pt lives at home with family.  Michael Riddle is a 66M of cholangiocarcinoma (s/p resection, chemo, RT, and bile duct/L liver lobe resection + Ju en Y Hepaticojejunostomy currently off treatment), ascites receiving weekly therapeutic paracentesis (last done 1/20/2022), portal vein thrombosis off AC (due to GIB), HTN, HLD, BRIAN, and TB as a child p/w 2 days of confusion and AMS. Notably, pt had a recent admission 1/16 for AMS, eating napkins thinking they were roti, attributed to hepatic encephalopathy with elevated ammonia to 80, which downtrended to 30 s/p lactulose enema. Pt had been discharged home with hepatology follow-up but was not given home lactulose. Daughter reports this time he has had 2 days of intermittent confusion. Pt lives at home with family, and was agitated at home, slurring his words. She states she feels his confusion this time is similar to his episode 1/16. She states at baseline pt is calm, Aox3, walks with rolling walker and assistance, home PT. Pt is currently calm in ED, which she states is consistent with his baseline. Pt also regularly receives therapeutic paracenteses weekly that consistently drain ~4L output (last done 1/20, next scheduled 1/27). Cholangiocarcinoma originally diagnosed in 2016, s/p resection, chemo, RT, not currently on treatment.     ED course: afebrile, VSS, WBC 6.13, ammonia 65, VBG 7.39/27. COVID positive for several weeks (eligible for non-covid admission 1/20). Michael Riddle is a 66M of cholangiocarcinoma (s/p resection, chemo and bile duct/L liver lobe resection + Ju en Y Hepaticojejunostomy currently off treatment), ascites receiving weekly therapeutic paracentesis (last done 1/20/2022), portal vein thrombosis off AC (due to GIB), HTN, HLD, BRIAN, and TB as a child p/w 2 days of confusion and AMS. Notably, pt had a recent admission 1/16 for AMS, eating napkins thinking they were roti, attributed to hepatic encephalopathy with elevated ammonia to 80, which downtrended to 30 s/p lactulose enema. Pt had been discharged home with hepatology follow-up but was not given home lactulose. MRCP done that admission showed large complex cystic hepatic lesion abscess vs biloma and smaller ?metastatic liver lesion. Daughter reports this time he has had 2 days of intermittent confusion. Pt lives at home with family, and was agitated at home, slurring his words. She states she feels his confusion this time is similar to his episode 1/16. She states at baseline pt is calm, Aox3, walks with rolling walker and assistance, home PT.  Pt also regularly receives therapeutic paracenteses weekly that consistently drain ~4L output (last done 1/20, next scheduled 1/27). Cholangiocarcinoma originally diagnosed in 2016, s/p resection, chemo, RT, not currently on treatment.     ED course: afebrile, VSS, WBC 6.13, ammonia 65, VBG 7.39/27. COVID positive for several weeks (eligible for non-covid admission 1/20). CT head without acute changes. Given lactulose enema x1 with improvement in mental status, now calm Aox3, daughter states is baseline.

## 2022-01-24 NOTE — H&P ADULT - PROBLEM SELECTOR PLAN 7
Diet: no beef, DASH  DVT ppx: lovenox 40mg     [ ] PT consult? Diet: no beef, DASH  DVT ppx: lovenox 40mg     -Pt already has home PT set up, rolling walker at home    GOC: FULL CODE, daughter is HCP -chronic portal vein thrombosis off therapeutic AC given GI bleed

## 2022-01-24 NOTE — ED ADULT NURSE REASSESSMENT NOTE - NS ED NURSE REASSESS COMMENT FT1
Pt received in TR-A. Pt a/ox3, resting comfortably in stretcher. Respirations even and unlabored. Pt in no active distress at this time. Vital signs reassessed as noted. Awaiting bed assignment.

## 2022-01-25 DIAGNOSIS — I81 PORTAL VEIN THROMBOSIS: ICD-10-CM

## 2022-01-25 DIAGNOSIS — Z29.9 ENCOUNTER FOR PROPHYLACTIC MEASURES, UNSPECIFIED: ICD-10-CM

## 2022-01-25 DIAGNOSIS — Z98.890 OTHER SPECIFIED POSTPROCEDURAL STATES: Chronic | ICD-10-CM

## 2022-01-25 DIAGNOSIS — T59.891A TOXIC EFFECT OF OTHER SPECIFIED GASES, FUMES AND VAPORS, ACCIDENTAL (UNINTENTIONAL), INITIAL ENCOUNTER: Chronic | ICD-10-CM

## 2022-01-25 DIAGNOSIS — U07.1 COVID-19: ICD-10-CM

## 2022-01-25 DIAGNOSIS — E87.1 HYPO-OSMOLALITY AND HYPONATREMIA: ICD-10-CM

## 2022-01-25 DIAGNOSIS — G93.41 METABOLIC ENCEPHALOPATHY: ICD-10-CM

## 2022-01-25 DIAGNOSIS — R60.0 LOCALIZED EDEMA: ICD-10-CM

## 2022-01-25 DIAGNOSIS — C22.1 INTRAHEPATIC BILE DUCT CARCINOMA: ICD-10-CM

## 2022-01-25 DIAGNOSIS — R16.0 HEPATOMEGALY, NOT ELSEWHERE CLASSIFIED: ICD-10-CM

## 2022-01-25 DIAGNOSIS — G93.40 ENCEPHALOPATHY, UNSPECIFIED: ICD-10-CM

## 2022-01-25 LAB
24R-OH-CALCIDIOL SERPL-MCNC: 15.9 NG/ML — LOW (ref 30–80)
ALBUMIN SERPL ELPH-MCNC: 2.9 G/DL — LOW (ref 3.3–5)
ALP SERPL-CCNC: 668 U/L — HIGH (ref 40–120)
ALT FLD-CCNC: 43 U/L — HIGH (ref 4–41)
AMMONIA BLD-MCNC: 170 UMOL/L — HIGH (ref 11–55)
ANION GAP SERPL CALC-SCNC: 10 MMOL/L — SIGNIFICANT CHANGE UP (ref 7–14)
APPEARANCE UR: CLEAR — SIGNIFICANT CHANGE UP
AST SERPL-CCNC: 43 U/L — HIGH (ref 4–40)
BACTERIA # UR AUTO: NEGATIVE — SIGNIFICANT CHANGE UP
BASOPHILS # BLD AUTO: 0.02 K/UL — SIGNIFICANT CHANGE UP (ref 0–0.2)
BASOPHILS NFR BLD AUTO: 0.4 % — SIGNIFICANT CHANGE UP (ref 0–2)
BILIRUB SERPL-MCNC: 1.3 MG/DL — HIGH (ref 0.2–1.2)
BILIRUB UR-MCNC: NEGATIVE — SIGNIFICANT CHANGE UP
BUN SERPL-MCNC: 29 MG/DL — HIGH (ref 7–23)
CALCIUM SERPL-MCNC: 8.1 MG/DL — LOW (ref 8.4–10.5)
CHLORIDE SERPL-SCNC: 106 MMOL/L — SIGNIFICANT CHANGE UP (ref 98–107)
CO2 SERPL-SCNC: 16 MMOL/L — LOW (ref 22–31)
COLOR SPEC: YELLOW — SIGNIFICANT CHANGE UP
CREAT SERPL-MCNC: 1.1 MG/DL — SIGNIFICANT CHANGE UP (ref 0.5–1.3)
DIFF PNL FLD: NEGATIVE — SIGNIFICANT CHANGE UP
EOSINOPHIL # BLD AUTO: 0.27 K/UL — SIGNIFICANT CHANGE UP (ref 0–0.5)
EOSINOPHIL NFR BLD AUTO: 4.9 % — SIGNIFICANT CHANGE UP (ref 0–6)
EPI CELLS # UR: 1 /HPF — SIGNIFICANT CHANGE UP (ref 0–5)
FOLATE SERPL-MCNC: 12.5 NG/ML — SIGNIFICANT CHANGE UP (ref 3.1–17.5)
GLUCOSE SERPL-MCNC: 105 MG/DL — HIGH (ref 70–99)
GLUCOSE UR QL: NEGATIVE — SIGNIFICANT CHANGE UP
HCT VFR BLD CALC: 23.2 % — LOW (ref 39–50)
HGB BLD-MCNC: 7.9 G/DL — LOW (ref 13–17)
HYALINE CASTS # UR AUTO: 1 /LPF — SIGNIFICANT CHANGE UP (ref 0–7)
IANC: 3.25 K/UL — SIGNIFICANT CHANGE UP (ref 1.5–8.5)
IMM GRANULOCYTES NFR BLD AUTO: 0.7 % — SIGNIFICANT CHANGE UP (ref 0–1.5)
KETONES UR-MCNC: NEGATIVE — SIGNIFICANT CHANGE UP
LEUKOCYTE ESTERASE UR-ACNC: NEGATIVE — SIGNIFICANT CHANGE UP
LYMPHOCYTES # BLD AUTO: 1.3 K/UL — SIGNIFICANT CHANGE UP (ref 1–3.3)
LYMPHOCYTES # BLD AUTO: 23.8 % — SIGNIFICANT CHANGE UP (ref 13–44)
MAGNESIUM SERPL-MCNC: 2.2 MG/DL — SIGNIFICANT CHANGE UP (ref 1.6–2.6)
MCHC RBC-ENTMCNC: 30 PG — SIGNIFICANT CHANGE UP (ref 27–34)
MCHC RBC-ENTMCNC: 34.1 GM/DL — SIGNIFICANT CHANGE UP (ref 32–36)
MCV RBC AUTO: 88.2 FL — SIGNIFICANT CHANGE UP (ref 80–100)
MONOCYTES # BLD AUTO: 0.58 K/UL — SIGNIFICANT CHANGE UP (ref 0–0.9)
MONOCYTES NFR BLD AUTO: 10.6 % — SIGNIFICANT CHANGE UP (ref 2–14)
NEUTROPHILS # BLD AUTO: 3.25 K/UL — SIGNIFICANT CHANGE UP (ref 1.8–7.4)
NEUTROPHILS NFR BLD AUTO: 59.6 % — SIGNIFICANT CHANGE UP (ref 43–77)
NITRITE UR-MCNC: NEGATIVE — SIGNIFICANT CHANGE UP
NRBC # BLD: 0 /100 WBCS — SIGNIFICANT CHANGE UP
NRBC # FLD: 0 K/UL — SIGNIFICANT CHANGE UP
NT-PROBNP SERPL-SCNC: 1295 PG/ML — HIGH
PH UR: 6 — SIGNIFICANT CHANGE UP (ref 5–8)
PHOSPHATE SERPL-MCNC: 2.9 MG/DL — SIGNIFICANT CHANGE UP (ref 2.5–4.5)
PLATELET # BLD AUTO: 121 K/UL — LOW (ref 150–400)
POTASSIUM SERPL-MCNC: 4.4 MMOL/L — SIGNIFICANT CHANGE UP (ref 3.5–5.3)
POTASSIUM SERPL-SCNC: 4.4 MMOL/L — SIGNIFICANT CHANGE UP (ref 3.5–5.3)
PROT SERPL-MCNC: 6.2 G/DL — SIGNIFICANT CHANGE UP (ref 6–8.3)
PROT UR-MCNC: ABNORMAL
RBC # BLD: 2.63 M/UL — LOW (ref 4.2–5.8)
RBC # FLD: 21.1 % — HIGH (ref 10.3–14.5)
RBC CASTS # UR COMP ASSIST: 2 /HPF — SIGNIFICANT CHANGE UP (ref 0–4)
SODIUM SERPL-SCNC: 132 MMOL/L — LOW (ref 135–145)
SP GR SPEC: 1.02 — SIGNIFICANT CHANGE UP (ref 1–1.05)
T PALLIDUM AB TITR SER: NEGATIVE — SIGNIFICANT CHANGE UP
UROBILINOGEN FLD QL: SIGNIFICANT CHANGE UP
VIT B12 SERPL-MCNC: 2000 PG/ML — HIGH (ref 200–900)
WBC # BLD: 5.46 K/UL — SIGNIFICANT CHANGE UP (ref 3.8–10.5)
WBC # FLD AUTO: 5.46 K/UL — SIGNIFICANT CHANGE UP (ref 3.8–10.5)
WBC UR QL: 5 /HPF — SIGNIFICANT CHANGE UP (ref 0–5)

## 2022-01-25 PROCEDURE — 99223 1ST HOSP IP/OBS HIGH 75: CPT | Mod: GC

## 2022-01-25 RX ORDER — LACTULOSE 10 G/15ML
40 SOLUTION ORAL
Refills: 0 | Status: COMPLETED | OUTPATIENT
Start: 2022-01-25 | End: 2022-01-25

## 2022-01-25 RX ORDER — LACTULOSE 10 G/15ML
200 SOLUTION ORAL ONCE
Refills: 0 | Status: DISCONTINUED | OUTPATIENT
Start: 2022-01-25 | End: 2022-01-26

## 2022-01-25 RX ORDER — LACTULOSE 10 G/15ML
20 SOLUTION ORAL THREE TIMES A DAY
Refills: 0 | Status: DISCONTINUED | OUTPATIENT
Start: 2022-01-25 | End: 2022-01-25

## 2022-01-25 RX ORDER — ACETAMINOPHEN 500 MG
650 TABLET ORAL EVERY 6 HOURS
Refills: 0 | Status: DISCONTINUED | OUTPATIENT
Start: 2022-01-25 | End: 2022-01-27

## 2022-01-25 RX ORDER — ENOXAPARIN SODIUM 100 MG/ML
40 INJECTION SUBCUTANEOUS DAILY
Refills: 0 | Status: DISCONTINUED | OUTPATIENT
Start: 2022-01-25 | End: 2022-01-25

## 2022-01-25 RX ORDER — CHOLECALCIFEROL (VITAMIN D3) 125 MCG
1000 CAPSULE ORAL DAILY
Refills: 0 | Status: DISCONTINUED | OUTPATIENT
Start: 2022-01-25 | End: 2022-01-27

## 2022-01-25 RX ORDER — LACTULOSE 10 G/15ML
60 SOLUTION ORAL ONCE
Refills: 0 | Status: COMPLETED | OUTPATIENT
Start: 2022-01-25 | End: 2022-01-25

## 2022-01-25 RX ORDER — LACTULOSE 10 G/15ML
45 SOLUTION ORAL EVERY 6 HOURS
Refills: 0 | Status: DISCONTINUED | OUTPATIENT
Start: 2022-01-26 | End: 2022-01-27

## 2022-01-25 RX ORDER — LANOLIN ALCOHOL/MO/W.PET/CERES
3 CREAM (GRAM) TOPICAL AT BEDTIME
Refills: 0 | Status: DISCONTINUED | OUTPATIENT
Start: 2022-01-25 | End: 2022-01-27

## 2022-01-25 RX ORDER — SODIUM BICARBONATE 1 MEQ/ML
650 SYRINGE (ML) INTRAVENOUS THREE TIMES A DAY
Refills: 0 | Status: DISCONTINUED | OUTPATIENT
Start: 2022-01-25 | End: 2022-01-26

## 2022-01-25 RX ORDER — LACTULOSE 10 G/15ML
10 SOLUTION ORAL ONCE
Refills: 0 | Status: COMPLETED | OUTPATIENT
Start: 2022-01-25 | End: 2022-01-25

## 2022-01-25 RX ORDER — PANTOPRAZOLE SODIUM 20 MG/1
40 TABLET, DELAYED RELEASE ORAL
Refills: 0 | Status: DISCONTINUED | OUTPATIENT
Start: 2022-01-25 | End: 2022-01-27

## 2022-01-25 RX ORDER — INFLUENZA VIRUS VACCINE 15; 15; 15; 15 UG/.5ML; UG/.5ML; UG/.5ML; UG/.5ML
0.5 SUSPENSION INTRAMUSCULAR ONCE
Refills: 0 | Status: DISCONTINUED | OUTPATIENT
Start: 2022-01-25 | End: 2022-01-27

## 2022-01-25 RX ORDER — LACTULOSE 10 G/15ML
200 SOLUTION ORAL ONCE
Refills: 0 | Status: COMPLETED | OUTPATIENT
Start: 2022-01-25 | End: 2022-01-25

## 2022-01-25 RX ORDER — LACTULOSE 10 G/15ML
10 SOLUTION ORAL DAILY
Refills: 0 | Status: DISCONTINUED | OUTPATIENT
Start: 2022-01-25 | End: 2022-01-25

## 2022-01-25 RX ORDER — SODIUM CHLORIDE 9 MG/ML
1000 INJECTION INTRAMUSCULAR; INTRAVENOUS; SUBCUTANEOUS
Refills: 0 | Status: DISCONTINUED | OUTPATIENT
Start: 2022-01-25 | End: 2022-01-27

## 2022-01-25 RX ADMIN — Medication 650 MILLIGRAM(S): at 20:49

## 2022-01-25 RX ADMIN — SODIUM CHLORIDE 50 MILLILITER(S): 9 INJECTION INTRAMUSCULAR; INTRAVENOUS; SUBCUTANEOUS at 10:09

## 2022-01-25 RX ADMIN — ENOXAPARIN SODIUM 40 MILLIGRAM(S): 100 INJECTION SUBCUTANEOUS at 13:21

## 2022-01-25 RX ADMIN — LACTULOSE 40 GRAM(S): 10 SOLUTION ORAL at 18:11

## 2022-01-25 RX ADMIN — LACTULOSE 200 GRAM(S): 10 SOLUTION ORAL at 18:12

## 2022-01-25 RX ADMIN — LACTULOSE 10 GRAM(S): 10 SOLUTION ORAL at 15:03

## 2022-01-25 RX ADMIN — LACTULOSE 40 GRAM(S): 10 SOLUTION ORAL at 20:50

## 2022-01-25 RX ADMIN — Medication 650 MILLIGRAM(S): at 05:33

## 2022-01-25 RX ADMIN — LACTULOSE 10 GRAM(S): 10 SOLUTION ORAL at 13:19

## 2022-01-25 RX ADMIN — Medication 1000 UNIT(S): at 20:50

## 2022-01-25 RX ADMIN — LACTULOSE 60 GRAM(S): 10 SOLUTION ORAL at 19:04

## 2022-01-25 RX ADMIN — Medication 650 MILLIGRAM(S): at 13:19

## 2022-01-25 RX ADMIN — LACTULOSE 40 GRAM(S): 10 SOLUTION ORAL at 23:21

## 2022-01-25 RX ADMIN — PANTOPRAZOLE SODIUM 40 MILLIGRAM(S): 20 TABLET, DELAYED RELEASE ORAL at 05:33

## 2022-01-25 NOTE — PROGRESS NOTE ADULT - SUBJECTIVE AND OBJECTIVE BOX
Progress Note    22 (1d)  -----------------------------------------------------------------------------------------------------------  EDUAROD Hudson PGY1  Pager# [phone]  After 7pm, please page night float pager #[phone]  ------------------------------------------------------------------------------------------------------------    Patient is a 66y old  Male who presents with a chief complaint of AMS (2022 15:02)      Subjective / Overnight Events :  Admitted overnight for encephalopathy, recent admitted for the same and improved on lactulose and discharged home. Daughter was at bedside this AM- brought pt in for confusion, states that he was having trouble remembering words e.g. plate. Noted that his milder than his presentation during recent admission. Was given lactulose in ED, believes he's mentating a bit better now but still confused, AAOx2 and appears less alert than he normally is, not as talkative.   Per daughter, pt was admitted at a OSH in 2021 for sepsis and was found to have portal vein thrombosis. Since then he has developed ascites and has been getting therapeutic paracentesis. Now getting it weekly, last . Does not have a port placed, have been unable to get procedure b/c ?testing positive for covid for weeks.    - repeat ammonia level 170 likely error, pt still mentating similarly as prior confirmed by daughter at bedside    Additional ROS (if any):    MEDICATIONS  (STANDING):  influenza   Vaccine 0.5 milliLiter(s) IntraMuscular once  lactulose Syrup 40 Gram(s) Oral every 2 hours  pantoprazole    Tablet 40 milliGRAM(s) Oral before breakfast  sodium bicarbonate 650 milliGRAM(s) Oral three times a day  sodium chloride 0.9%. 1000 milliLiter(s) (50 mL/Hr) IV Continuous <Continuous>    MEDICATIONS  (PRN):  acetaminophen     Tablet .. 650 milliGRAM(s) Oral every 6 hours PRN Temp greater or equal to 38C (100.4F), Mild Pain (1 - 3)  melatonin 3 milliGRAM(s) Oral at bedtime PRN Insomnia      CAPILLARY BLOOD GLUCOSE        I&O's Summary      PHYSICAL EXAM:  Vital Signs Last 24 Hrs  T(C): 36.3 (2022 14:20), Max: 36.8 (2022 01:36)  T(F): 97.3 (2022 14:20), Max: 98.2 (2022 01:36)  HR: 74 (2022 14:20) (74 - 85)  BP: 155/77 (2022 14:20) (135/78 - 155/77)  BP(mean): --  RR: 20 (2022 14:20) (18 - 20)  SpO2: 100% (2022 14:20) (100% - 100%)    General: NAD, non-toxic appearing, sitting up at edge of stretcher   HEENT: PERRLA, EOMi, no scleral icterus  CV: RRR, normal S1 and S2, no m/r/g  Lungs: normal respiratory effort. CTAB, no wheezes, rales, or rhonchi  Abd: distended, +fluid wave, nontender   Ext: no edema, 2+ peripheral pulses   Pysch: AAOx2, flat affect   Neuro: grossly non-focal, moving all extremities spontaneously. +asterixis   Skin: no rashes or lesions     LABS:                          7.9    5.46  )-----------( 121      ( 2022 06:24 )             23.2       WBC Trend: 5.46<--, 6.13<--, 6.21<--  Hb Trend: 7.9<--, 8.9<--, 7.5<--    01-25    132<L>  |  106  |  29<H>  ----------------------------<  105<H>  4.4   |  16<L>  |  1.10    Ca    8.1<L>      2022 06:24  Phos  2.9       Mg     2.20         TPro  6.2  /  Alb  2.9<L>  /  TBili  1.3<H>  /  DBili  x   /  AST  43<H>  /  ALT  43<H>  /  AlkPhos  668<H>      PT/INR - ( 2022 11:26 )   PT: 14.9 sec;   INR: 1.32 ratio         PTT - ( 2022 11:26 )  PTT:35.9 sec      Urinalysis Basic - ( 2022 10:11 )    Color: Yellow / Appearance: Clear / S.025 / pH: x  Gluc: x / Ketone: Negative  / Bili: Negative / Urobili: <2 mg/dL   Blood: x / Protein: 30 mg/dL / Nitrite: Negative   Leuk Esterase: Negative / RBC: 2 /HPF / WBC 5 /HPF   Sq Epi: x / Non Sq Epi: 1 /HPF / Bacteria: Negative        Culture - Blood (collected 2022 12:20)  Source: .Blood Blood-Peripheral  Preliminary Report (2022 13:02):    No growth to date.    Culture - Blood (collected 2022 12:20)  Source: .Blood Blood-Peripheral  Preliminary Report (2022 13:02):    No growth to date.        RADIOLOGY & ADDITIONAL TESTS: Reviewed

## 2022-01-25 NOTE — PATIENT PROFILE ADULT - FALL HARM RISK - HARM RISK INTERVENTIONS

## 2022-01-25 NOTE — PROGRESS NOTE ADULT - PROBLEM SELECTOR PLAN 1
-Suspect hepatic encephalopathy given elevated ammonia (Ammonia = 65) and improvement in mental status after lactulose enema  -cause of hyperammonemia likely underlying cholangiocarcinoma  -unlikely structural given negative CT head. Unlikely infectious, no infectious sxs, fevers, or leukocytosis.   -Pt mental status now significantly improved, back at baseline    Plan:  -check TSH, B12, RPR ammonia  -standing lactulose 10g qd to start, titrate to 2-3 BMs/day  -c/w hepatology

## 2022-01-25 NOTE — PHYSICAL THERAPY INITIAL EVALUATION ADULT - LIVES WITH, PROFILE
6MWT, PFT and CXR within 2-4 weeks with follow up with Dr. Odell or MAYKEL for post fall hypoxemia.      family at home

## 2022-01-25 NOTE — CONSULT NOTE ADULT - ATTENDING COMMENTS
66M, from [place], CCC 2015 T2N0 s/p Ju en Y and L liver lobe resection, last chemo 3/2021 (Dr. Mosquera), EUS-FNA 9/2021 portal lymph node positive, then hospitalization [facility] Sep 2021 with fever, found PVT, new ascites requiring weekly LVPs, also GI bleed off AC since Dec, hyponatremia, recent admission Dec [facility] w E. coli UTI and bacteremia, recent admission HE since Dec at [facility] d/c’d on 1/19 without lactulose, returned on 1/24 with hepatic encephalopathy.     - portal HTN with ascites since September per daughter and hepatic encephalopathy since December - caused by his portal vein thrombosis, large hepatic cyst 10 cm may also contribute  - HE: still asterixis, confused  - ascites: mod to large on exam. His portal HTN  Last dx was on 1/16: protein 1.2, not on diuretics b/o h/o hyponatremia, takes NaCl tablets. Na here 130  - CCC: 1/18 MRI: enlarging retroperitoneal mass to xavier hepatis, and 10 cm R lobe cyst., also smaller 1.5 cm dome cyst Pt. had liver biopsy last year: biloma vs. fluid collection (not seen before). He is afebrile and his WBC is normal. Bilirubin only mildly elevated 1.3 mg/dL    - afebrile, WBC 5.5, Hb 7.9, , INR 1.32 on 1/24  - Na 132, creat 1.10  - 1.3/668, 43/43, alb 2.9 - MELD-Na   - h/o hyponatremia, at home on NaCl tablets - records 1/2022 minimum Na 130 only  - Meds: lactulose 20g tid, patno, Nabicarb     Exam: cachectic after 50 lbs weight loss last year or so, fluid wave, no edema, mute, asterixis  Daughter is nurse.  Case discussed with radiology and Dr. Horner. A drainage of the hepatic cyst would need to remain long-term if it is a biloma caused by his hilar tumor and would pose a risk for infection, especially since he has recurring ascites.    — HE: lactulose 300 g rectal, 60 mL PO now, then 45g [id number] until midnight, then q6  — liver cyst: will also plan review at tumor board tomorrow AM  — LVP, remove all ascites 66M, from [place], CCC 2015 T2N0 s/p Ju en Y and L liver lobe resection, last chemo 3/2021 (Dr. Mosquera), EUS-FNA 9/2021 portal lymph node positive, then hospitalization [facility] Sep 2021 with fever, found PVT, new ascites requiring weekly LVPs, also GI bleed off AC since Dec, hyponatremia, recent admission Dec [facility] w E. coli UTI and bacteremia, recent admission HE since Dec at [facility] d/c’d on 1/19 without lactulose, returned on 1/24 with hepatic encephalopathy.     - portal HTN with ascites since September per daughter and hepatic encephalopathy since December - caused by his portal vein thrombosis, large hepatic cyst 10 cm may also contribute  - HE: still asterixis, confused  - ascites: mod to large on exam. His portal HTN  Last dx was on 1/16: protein 1.2, not on diuretics b/o h/o hyponatremia, takes NaCl tablets. Na here 130  - CCC: 1/18 MRI: enlarging retroperitoneal mass to xavier hepatis, and 10 cm R lobe cyst., also smaller 1.5 cm dome cyst Pt. had liver biopsy last year: biloma vs. fluid collection (not seen before). He is afebrile and his WBC is normal. Bilirubin only mildly elevated 1.3 mg/dL    - afebrile, WBC 5.5, Hb 7.9, , INR 1.32 on 1/24  - Na 132, creat 1.10  - 1.3/668, 43/43, alb 2.9 - MELD-Na   - h/o hyponatremia, at home on NaCl tablets - records 1/2022 minimum Na 130 only  - Meds: lactulose 20g tid, patno, Nabicarb     Exam: cachectic after 50 lbs weight loss last year or so, fluid wave, no edema, mute, asterixis  Daughter is nurse.  Case discussed with radiology and Dr. Horner. A drainage of the hepatic cyst would need to remain long-term if it is a biloma caused by his hilar tumor and would pose a risk for infection, especially since he has recurring ascites.    — HE: lactulose 300 g rectal, 60 mL PO now, then 45g [id number] until midnight, then q6  — liver cyst: will also plan review at tumor board  — LVP, remove all ascites

## 2022-01-25 NOTE — PHYSICAL THERAPY INITIAL EVALUATION ADULT - PHYSICAL ASSIST/NONPHYSICAL ASSIST: SUPINE/SIT, REHAB EVAL
RD/tear warning symptoms reviewed. F&F brochure given. Call immediately if increase in floaters, flashes, veil, blur. 1 person assist

## 2022-01-25 NOTE — PROGRESS NOTE ADULT - ASSESSMENT
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 Consulted nephro for hyponatremia     A/P    Hyponatremia   etiology?  possibly hypervolemic in the setting of ascites   in the setting of poor oral intake. vs SIADH  TSH slightly high.  cortisol is normal   worsening today  get urine Na, urine osmol   monitor BMP     Hyperkalemia   hemolyzed   repeated K WNL     Acidosis   without anion gap  resume sodium bicarb 650mg tid     HTN  optimal   monitor

## 2022-01-25 NOTE — CONSULT NOTE ADULT - ASSESSMENT
67yo M with history of cholangiocarcinoma s/p Ju en Y hepaticojejunostomy and adjuvant chemotherapy (last chemo reportedly 2021), portal vein thrombosis (dx 9/2021, off AC 2/2 hx of GIB), HTN, BRIAN and childhood TB rpesenting with 2 days of confusion, improved with lactulose enema. Hepatology consulted for question of hepatic encephalopathy and intrahepatic ductal dilation.      INCOMPLETE NOTE    //Encephalopathy  -Likely HE given improvement in mental status with lactulose  -Would continue lactulose 20g TID, titrate to 2-3BM, would discharge on this medication  -Infectious work-up ongoing, negative so far    //Ascites  -SAAG 2.9, consistent with portal HTN  -Likely 2/2 chronic portal vein thrombosis  -Receiving q1week paracentesis as outpatient  -Would obtain diagnostic and therapeutic para this admission    //Intrahepatic ductal dilation  -Based on imaging, likely 2/2 extension of RP mass    //Cholangiocarcinoma 65yo M with history of cholangiocarcinoma [s/p partial hepatectomy, cholecytectomy with bile duct resection, Ju en Y hepaticojejunostomy and adjuvant chemotherapy (last chemo reportedly 3/2021)], portal vein thrombosis (dx 9/2021, off AC 2/2 hx of GIB), HTN, BRIAN and childhood TB presenting with 2 days of confusion, improved with lactulose enema. Hepatology consulted for question of hepatic encephalopathy and intrahepatic ductal dilation.      INCOMPLETE NOTE, pending d/w attending Dr. Reed    //Encephalopathy  -Likely HE given improvement in mental status with lactulose  -Would continue lactulose 20g TID, titrate to 2-3BM, please discharge on this medication  -Infectious work-up ongoing, negative so far    //Ascites  -SAAG 2.9, consistent with portal HTN  -Likely 2/2 chronic portal vein thrombosis  -Receiving q1week paracentesis as outpatient  -Recommend to obtain diagnostic and therapeutic para this admission  -Negative for malignant cells  -Will discuss role of diuretics    //Intrahepatic ductal dilation  -Based on imaging, likely 2/2 extension of RP mass  -Unclear if intervention can be performed given surgical anatomy, will review radiology in detail  -ALP, tbili unchanged  -Recommend daily LFTs with INR    //Hepatic cystic lesion  -First appearance 12/2021 per report from daughter  -Favor simple collection vs biloma post EUS with periportal LN biopsy 9/2021  -Does not appear to be etiology of intrahepatic ductal dilation per MRI/MRCP report    //Cholangiocarcinoma  Oncologist, Dr. Mosquera. S/p chemotherapy and adjuvant chemotherapy.  -Per last Onc note, not a current candidate for DMT given functional status  -RP mass, PET avid on PET scan from 2020, enlarging  -Periportal LN biopsy 9/2021, + for cholangioCA   65yo M with history of cholangiocarcinoma [s/p partial hepatectomy, cholecytectomy with bile duct resection, Ju en Y hepaticojejunostomy and adjuvant chemotherapy (last chemo reportedly 3/2021)], portal vein thrombosis (dx 9/2021, off AC 2/2 hx of GIB), HTN, BRIAN and childhood TB presenting with 2 days of confusion, improved with lactulose enema. Hepatology consulted for question of hepatic encephalopathy and intrahepatic ductal dilation.      //Encephalopathy  -Likely HE given improvement in mental status with lactulose  -Would continue lactulose, please increase to 30g q6h, titrate to 2-3BM, please discharge on this medication  -Please give one more lactulose enema tonight  -Lactulose 60g q2h until midnight or first bowel movement  -Infectious work-up ongoing, negative so far    //Ascites  -SAAG 2.9, consistent with portal HTN  -Likely 2/2 chronic portal vein thrombosis  -Receiving q1week paracentesis as outpatient  -Recommend to obtain diagnostic and therapeutic para this admission  -Negative for malignant cells  -Will discuss role of diuretics    //Intrahepatic ductal dilation  -Based on imaging, likely 2/2 extension of RP mass  -Unclear if intervention can be performed given surgical anatomy, reviewed with Radiology, plan for discussion at tumor board  -ALP, tbili unchanged  -Recommend daily LFTs with INR    //Hepatic cystic lesion  -First appearance 12/2021 per report from daughter  -Favor biloma in setting of obstruction 2/2 RP mass  -Interventions pending further discussion at tumor board  -Does not appear to be etiology of intrahepatic ductal dilation per MRI/MRCP report    //Cholangiocarcinoma  Oncologist, Dr. Mosquera. S/p chemotherapy and adjuvant chemotherapy.  -Per last Onc note, not a current candidate for DMT given functional status  -RP mass, PET avid on PET scan from 2020, enlarging  -Periportal LN biopsy 9/2021, + for cholangioCA  -Rec Onc eval      Seen and d/w Dr. Reed   67yo M with history of cholangiocarcinoma [s/p partial hepatectomy, cholecytectomy with bile duct resection, Ju en Y hepaticojejunostomy and adjuvant chemotherapy (last chemo reportedly 3/2021)], portal vein thrombosis (dx 9/2021, off AC 2/2 hx of GIB), HTN, BRIAN and childhood TB presenting with 2 days of confusion, improved with lactulose enema. Hepatology consulted for question of hepatic encephalopathy and intrahepatic ductal dilation.      //Encephalopathy  -Likely HE given improvement in mental status with lactulose  -Would continue lactulose, please increase to 30g q6h, titrate to 2-3BM, please discharge on this medication  -Please give one more lactulose enema tonight  -Lactulose 60g q2h until midnight or first bowel movement  -Infectious work-up ongoing, negative so far    //Ascites  -SAAG 2.9, consistent with portal HTN  -Likely 2/2 chronic portal vein thrombosis  -Receiving q1week paracentesis as outpatient  -Recommend to obtain diagnostic and therapeutic para tomorrow  -Negative for malignant cells  -Will discuss role of diuretics in setting of chronic hyponatremia    //Intrahepatic ductal dilation  -Based on imaging, likely 2/2 extension of RP mass  -Unclear if intervention can be performed given surgical anatomy, reviewed with Radiology, plan for discussion at tumor board  -ALP, tbili unchanged  -Recommend daily LFTs with INR    //Hepatic cystic lesion  -First appearance 12/2021 per report from daughter  -Favor biloma in setting of obstruction 2/2 RP mass  -Interventions pending further discussion at tumor board  -Does not appear to be etiology of intrahepatic ductal dilation per MRI/MRCP report    //Cholangiocarcinoma  Oncologist, Dr. Mosquera. S/p chemotherapy and adjuvant chemotherapy.  -Per last Onc note, not a current candidate for DMT given functional status  -RP mass, PET avid on PET scan from 2020, enlarging  -Periportal LN biopsy 9/2021, + for cholangioCA  -Rec Onc karin Rodríguez and d/w Dr. Reed

## 2022-01-25 NOTE — PROGRESS NOTE ADULT - PROBLEM SELECTOR PLAN 3
-lobular cystic collection found on R hepatic lobe with associated central biliary duct dilatation.  -possibly infection/abscess vs. intrahepatic biliary radicals vs. cystic mets  -MR abdomen showing cystic mass, abscess vs biloma, however no infectious symptoms  -pt has outpatient hepatology appt for 3/27    -c/s hepatology, appreciate recs

## 2022-01-25 NOTE — CONSULT NOTE ADULT - SUBJECTIVE AND OBJECTIVE BOX
Hepatology Consultation:    Patient is a 66y old  Male who presents with a chief complaint of AMS (25 Jan 2022 14:02)      Admitted on: 01-24-22  HPI:  Michael Riddle is a 66M of cholangiocarcinoma (s/p resection, chemo and bile duct/L liver lobe resection + Ju en Y Hepaticojejunostomy currently off treatment), ascites receiving weekly therapeutic paracentesis (last done 1/20/2022), portal vein thrombosis off AC (due to GIB), HTN, HLD, BRIAN, and TB as a child p/w 2 days of confusion and AMS. Notably, pt had a recent admission 1/16 for AMS, eating napkins thinking they were roti, attributed to hepatic encephalopathy with elevated ammonia to 80, which downtrended to 30 s/p lactulose enema. Pt had been discharged home with hepatology follow-up but was not given home lactulose. MRCP done that admission showed large complex cystic hepatic lesion abscess vs biloma and smaller ?metastatic liver lesion. Daughter reports this time he has had 2 days of intermittent confusion. Pt lives at home with family, and was agitated at home, slurring his words. She states she feels his confusion this time is similar to his episode 1/16. She states at baseline pt is calm, Aox3, walks with rolling walker and assistance, home PT.  Pt also regularly receives therapeutic paracenteses weekly that consistently drain ~4L output (last done 1/20, next scheduled 1/27). Cholangiocarcinoma originally diagnosed in 2016, s/p resection, chemo, RT, not currently on treatment.     ED course: afebrile, VSS, WBC 6.13, ammonia 65, VBG 7.39/27. COVID positive for several weeks (eligible for non-covid admission 1/20). CT head without acute changes. Given lactulose enema x1 with improvement in mental status, now calm Aox3, daughter states is baseline.  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    History of abdominal surgery  h/o bile duct and left lobe of liver resected and Ju En Y hepaticojejunostomy    History of liver biopsy  9/2021    History of abdominal paracentesis        FAMILY HISTORY:  Family history of diabetes mellitus (Father)        Social History:  Tobacco:  Alcohol:  Drugs:    Home Medications:  pantoprazole 40 mg oral delayed release tablet: 1 tab(s) orally once a day (16 Jan 2022 12:05)  Sodium Chloride 1 g oral tablet:  (16 Jan 2022 12:05)    MEDICATIONS  (STANDING):  enoxaparin Injectable 40 milliGRAM(s) SubCutaneous daily  influenza   Vaccine 0.5 milliLiter(s) IntraMuscular once  lactulose Syrup 20 Gram(s) Oral three times a day  pantoprazole    Tablet 40 milliGRAM(s) Oral before breakfast  sodium bicarbonate 650 milliGRAM(s) Oral three times a day  sodium chloride 0.9%. 1000 milliLiter(s) (50 mL/Hr) IV Continuous <Continuous>    MEDICATIONS  (PRN):  acetaminophen     Tablet .. 650 milliGRAM(s) Oral every 6 hours PRN Temp greater or equal to 38C (100.4F), Mild Pain (1 - 3)  melatonin 3 milliGRAM(s) Oral at bedtime PRN Insomnia      Allergies  No Known Allergies      Review of Systems:   Constitutional:  No Fever, No Chills  ENT/Mouth:  No Hearing Changes,  No Difficulty Swallowing  Eyes:  No Eye Pain, No Vision Changes  Cardiovascular:  No Chest Pain, No Palpitations  Respiratory:  No Cough, No Dyspnea  Gastrointestinal:  As described in HPI  Musculoskeletal:  No Joint Swelling, No Back Pain  Skin:  No Skin Lesions, No Jaundice  Neuro:  No Syncope, No Dizziness  Heme/Lymph:  No Bruising, No Bleeding.    Physical Examination:  T(C): 36.3 (01-25-22 @ 14:20), Max: 36.8 (01-25-22 @ 01:36)  HR: 74 (01-25-22 @ 14:20) (74 - 85)  BP: 155/77 (01-25-22 @ 14:20) (135/78 - 155/77)  RR: 20 (01-25-22 @ 14:20) (18 - 20)  SpO2: 100% (01-25-22 @ 14:20) (100% - 100%)    Weight (kg): 60.9 (01-25-22 @ 14:20)      Constitutional: No acute distress.  Eyes:. Conjunctivae are clear, Sclera is non-icteric.  Ears Nose and Throat: The external ears are normal appearing,  Oral mucosa is pink and moist.  Respiratory:  No signs of respiratory distress. Lung sounds are clear bilaterally.  Cardiovascular:  S1 S2, Regular rate and rhythm.  GI: Abdomen is soft, symmetric, and non-tender with distension and positive fluid wave. Prior bishop-chevron scar well-healed  Neuro: No Tremor, No involuntary movements, + asterixis  Skin: No rashes, No Jaundice.    Data: (reviewed by attending)                        7.9    5.46  )-----------( 121      ( 25 Jan 2022 06:24 )             23.2     Hgb Trend:  7.9  01-25-22 @ 06:24  8.9  01-24-22 @ 11:26      01-25    132<L>  |  106  |  29<H>  ----------------------------<  105<H>  4.4   |  16<L>  |  1.10    Ca    8.1<L>      25 Jan 2022 06:24  Phos  2.9     01-25  Mg     2.20     01-25    TPro  6.2  /  Alb  2.9<L>  /  TBili  1.3<H>  /  DBili  x   /  AST  43<H>  /  ALT  43<H>  /  AlkPhos  668<H>  01-25    Liver panel trend:  TBili 1.3   /   AST 43   /   ALT 43   /   AlkP 668   /   Tptn 6.2   /   Alb 2.9    /   DBili --      01-25  TBili 1.4   /   AST 60   /   ALT 54   /   AlkP 731   /   Tptn 6.9   /   Alb 3.4    /   DBili --      01-24  TBili 1.2   /   AST 41   /   ALT 53   /   AlkP 675   /   Tptn 6.0   /   Alb 2.7    /   DBili --      01-19  TBili 2.0   /   AST 43   /   ALT 57   /   AlkP 732   /   Tptn 6.1   /   Alb 2.9    /   DBili --      01-18  TBili 1.3   /   AST 30   /   ALT 33   /   AlkP 530   /   Tptn 4.7   /   Alb 2.0    /   DBili --      01-18  TBili 1.6   /   AST 55   /   ALT 64   /   AlkP 756   /   Tptn 5.6   /   Alb 2.8    /   DBili --      01-17  TBili 1.4   /   AST 90   /   ALT 94   /   AlkP 934   /   Tptn 6.2   /   Alb 3.4    /   DBili --      01-16  TBili 1.4   /   AST 82   /   ALT 89   /   AlkP 980   /   Tptn 7.0   /   Alb 3.6    /   DBili --      01-16      PT/INR - ( 24 Jan 2022 11:26 )   PT: 14.9 sec;   INR: 1.32 ratio         PTT - ( 24 Jan 2022 11:26 )  PTT:35.9 sec    Culture - Blood (collected 24 Jan 2022 12:20)  Source: .Blood Blood-Peripheral  Preliminary Report (25 Jan 2022 13:02):    No growth to date.    Culture - Blood (collected 24 Jan 2022 12:20)  Source: .Blood Blood-Peripheral  Preliminary Report (25 Jan 2022 13:02):    No growth to date.          Radiology: (reviewed by attending)  < from: MR MRCP w/wo IV Cont (01.18.22 @ 19:58) >  IMPRESSION:  Increased intrahepatic ductal dilatation to the level of the xavier   hepatis, likely secondary to extension of the known retroperitoneal mass.  A large complex cystic lesion in the right hepatic lobe is indeterminate   and may represent an abscess, in the right clinical setting. A biloma is   also possible. The other much smaller cystic lesion in the hepatic dome   may represent a metastatic lesion.  Moderate to large ascites.      --- End of Report ---    < end of copied text >

## 2022-01-25 NOTE — PROGRESS NOTE ADULT - SUBJECTIVE AND OBJECTIVE BOX
[facility] [place] [facility]   MD ALEXANDER RUBY MD, PA INJUNG KO NP    TEL:  OFFICE: [phone]  DR MATT CELL: [phone]  DR. HOANG CELL: [phone]  CHIKIS GARNICA CELL: [phone]  GRACIELA SHAH CELL :[phone]    From 5pm-7am Answering Service [phone]    -- RENAL FOLLOW UP NOTE ---Date of Service 01-25-22 @ 14:02    Patient is a 66y old  Male who presents with a chief complaint of AMS (25 Jan 2022 08:32)    Patient seen and examined at bedside. No chest pain/sob    VITALS:  T(F): 97.7 (01-25-22 @ 05:21), Max: 98.2 (01-25-22 @ 01:36)  HR: 85 (01-25-22 @ 05:21)  BP: 139/86 (01-25-22 @ 05:21)  RR: 18 (01-25-22 @ 05:21)  SpO2: 100% (01-25-22 @ 05:21)  Wt(kg): --        PHYSICAL EXAM:  Constitutional: NAD  Neck: No JVD  Respiratory: CTAB, no wheezes, rales or rhonchi  Cardiovascular: S1, S2, RRR  Gastrointestinal: BS+, soft, NT/ND  Extremities: No peripheral edema    Hospital Medications:   MEDICATIONS  (STANDING):  enoxaparin Injectable 40 milliGRAM(s) SubCutaneous daily  lactulose Syrup 10 Gram(s) Oral daily  pantoprazole    Tablet 40 milliGRAM(s) Oral before breakfast  sodium bicarbonate 650 milliGRAM(s) Oral three times a day  sodium chloride 0.9%. 1000 milliLiter(s) (50 mL/Hr) IV Continuous <Continuous>      LABS:  01-25    132<L>  |  106  |  29<H>  ----------------------------<  105<H>  4.4   |  16<L>  |  1.10    Ca    8.1<L>      25 Jan 2022 06:24  Phos  2.9     01-25  Mg     2.20     01-25    TPro  6.2  /  Alb  2.9<L>  /  TBili  1.3<H>  /  DBili      /  AST  43<H>  /  ALT  43<H>  /  AlkPhos  668<H>  01-25    Creatinine Trend: 1.10 <--, 1.05 <--, 1.38 <--    Vitamin D, 25-Hydroxy: 15.9 ng/mL (01-25 @ 10:35)  Albumin, Serum: 2.9 g/dL (01-25 @ 06:24)  Phosphorus Level, Serum: 2.9 mg/dL (01-25 @ 06:24)                              7.9    5.46  )-----------( 121      ( 25 Jan 2022 06:24 )             23.2     Urine Studies:  Urinalysis - [01-25-22 @ 10:11]      Color Yellow / Appearance Clear / SG 1.025 / pH 6.0      Gluc Negative / Ketone Negative  / Bili Negative / Urobili <2 mg/dL       Blood Negative / Protein 30 mg/dL / Leuk Est Negative / Nitrite Negative      RBC 2 / WBC 5 / Hyaline 1 / Gran  / Sq Epi  / Non Sq Epi 1 / Bacteria Negative      Ferritin 874      [01-19-22 @ 06:59]  Vitamin D (25OH) 15.9      [01-25-22 @ 10:35]  TSH 1.93      [01-19-22 @ 06:59]    HCV 0.35, Nonreact      [01-17-22 @ 09:30]      RADIOLOGY & ADDITIONAL STUDIES:

## 2022-01-25 NOTE — PATIENT PROFILE ADULT - CONTRAINDICATIONS & PRECAUTIONS (SELECT ALL THAT APPLY)
EXAM DESCRIPTION:  CT HEAD WITHOUT



COMPLETED DATE/TIME:  6/17/2018 1:33 pm



REASON FOR STUDY:  stroke-like symptoms



COMPARISON:  CT brain 12/13/2017, 11/8/2017



TECHNIQUE:  Axial images acquired through the brain without intravenous contrast.  Images reviewed wi
th bone, brain and subdural windows.  Additional sagittal and coronal reconstructions were generated.
 Images stored on PACS.

All CT scanners at this facility use dose modulation, iterative reconstruction, and/or weight based d
osing when appropriate to reduce radiation dose to as low as reasonably achievable (ALARA).

[facility]: Dose Right  [facility]: CareDose    [facility]: Dose Right    [facility]: Teradose 4D    [facility]: Smart Technologies



RADIATION DOSE:  53 mGy.



LIMITATIONS:  None.



FINDINGS:  VENTRICLES: No hydrocephalus.  Hyperdense nodule is present in the foramen of Monro/anteri
or 3rd ventricle likely a colloid cyst.  This measures 2.6 cm in diameter, unchanged.

There are bilateral intraventricular drainage catheters entering the right and left lateral ventricle
s through the occipital horns, unchanged.

CEREBRUM: Small to moderate-sized acute nonhemorrhagic infarct in the left posterior temporal cortex 
and subcortical white matter axial images 19-23, with low attenuation in the brain parenchyma and eff
acement of the sulci.  This likely represents early subacute 1 day to 1-month-old ischemic change.  F
indings discussed with Dr. Horton in the emergency room.

An old left parietal subcortical white matter infarct is present unchanged from prior studies.

CEREBELLUM: No masses.  No hemorrhage.  No acute infarct.  Stable punctate calcification left amarilis, u
nchanged.  No evidence for acute infarction.

EXTRAAXIAL SPACES: No fluid collections.  No masses.

ORBITS AND GLOBE: No intra- or extraconal masses.  Globes post cataract surgery.

CALVARIUM: No fracture.

PARANASAL SINUSES: No fluid or mucosal thickening.

SOFT TISSUES: No mass or hematoma.

OTHER: No other significant finding.



IMPRESSION:  Acute or early subacute nonhemorrhagic infarct left posterior temporal cortex and subcor
tical white matter.

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  No hydrocephalus.  Intraven
tricular drainage catheters are unchanged from prior studies.

EVIDENCE OF ACUTE STROKE: Yes



COMMENT:   Pertinent positive or negative findings of the imaging study reported as a CRITICAL EXAM t
o ER Dr Horton  [id number] hours on 6/17/2018.

Category of Critical Exam: CT code stroke

Quality ID # [id number]: Final reports with documentation of one or more dose reduction techniques (e.g., Au
tomated exposure control, adjustment of the mA and/or kV according to patient size, use of iterative 
reconstruction technique)



TECHNICAL DOCUMENTATION:  JOB ID:  [id number]

 2011 [employer]- All Rights Reserved



Reading location - IP/workstation name: [email or web address]...

## 2022-01-25 NOTE — PROGRESS NOTE ADULT - PROBLEM SELECTOR PLAN 2
-mild hypoNa to 134, present since 1/16  -low po intake vs siADH vs liver etiology  -neph consulted, appreciate recs  -pt is on salt tabs at home, holding for now  -neph recommending sodium bicarb 650 TID

## 2022-01-25 NOTE — PROGRESS NOTE ADULT - PROBLEM SELECTOR PLAN 8
Diet: no beef, DASH  DVT ppx: Lovenox 40mg     [ ] PT eval ordered, can dc if recent PT eval 1/19 recommending home PT is sufficient per hospital protocol  -Pt already has home PT set up, rolling walker at home    Dispo: anticipate discharge home 1/25 as pt now mentating at baseline after receiving lactulose  GOC: FULL CODE, daughter is HCP

## 2022-01-25 NOTE — PROGRESS NOTE ADULT - PROBLEM SELECTOR PLAN 5
-asymmetric 2+ leg edema L>R, per daughter this is baseline since pt had a vein surgery in L leg  -Duplex US 1/18/2022 negative for DVTs  -Would benefit from compression stocking

## 2022-01-25 NOTE — PROGRESS NOTE ADULT - ASSESSMENT
65yo M with PMH cholangiocarcinoma (s/p chemo, RT, bile duct/L liver lobe resection + Ju en Y Hepaticojejunostomy and portal v. reconstruction, currently off treatment), chronic portal vein thrombosis off AC due to GIB (dx'ed 9/2021), and ascites receiving weekly therapeutic paracentesis (last done 1/20/2022), HTN, HLD, BRIAN, and TB as a child p/w AMS x2d and elevated ammonia likely 2/2 hepatic encephalopathy, recently discharged from [facility] for the same. Also with new large complex cystic lesion in R hepatic lobe found in recent admission.

## 2022-01-25 NOTE — PROGRESS NOTE ADULT - PROBLEM SELECTOR PLAN 4
-[place]'s tumor dx'd in 2015, s/p partial hepatectomy including the left lobe and gallbladder, removal of the extrahepatic bile duct, hepaticojejunostomy to the Ju-en-Y, reconstruction of the portal vein with saphenous vein patch (2016)  -intraabdominal recurrence was given gemcitabine/abraxane/RT but currently off of treatment  -seen by onc consults 1/16, recommended MRI but no plans for inpatient treatment  -MRCP 1/18 showing liver lesion ?metastasis  -follow with outpatient onc Dr. Andrew Mosquera, last seen 1/19    -c/w heme/onc tomorrow AM

## 2022-01-25 NOTE — PATIENT PROFILE ADULT - FUNCTIONAL SCREEN CURRENT LEVEL: COMMUNICATION, MLM
3 = unable to understand (not related to language barrier) No 0 = understands/communicates without difficulty

## 2022-01-26 ENCOUNTER — TRANSCRIPTION ENCOUNTER (OUTPATIENT)
Age: 67
End: 2022-01-26

## 2022-01-26 DIAGNOSIS — R18.8 OTHER ASCITES: ICD-10-CM

## 2022-01-26 LAB
ALBUMIN SERPL ELPH-MCNC: 2.9 G/DL — LOW (ref 3.3–5)
ALP SERPL-CCNC: 734 U/L — HIGH (ref 40–120)
ALT FLD-CCNC: 51 U/L — HIGH (ref 4–41)
ANION GAP SERPL CALC-SCNC: 12 MMOL/L — SIGNIFICANT CHANGE UP (ref 7–14)
APTT BLD: 41.1 SEC — HIGH (ref 27–36.3)
AST SERPL-CCNC: 56 U/L — HIGH (ref 4–40)
BILIRUB DIRECT SERPL-MCNC: 0.7 MG/DL — HIGH (ref 0–0.3)
BILIRUB SERPL-MCNC: 1.5 MG/DL — HIGH (ref 0.2–1.2)
BUN SERPL-MCNC: 29 MG/DL — HIGH (ref 7–23)
CALCIUM SERPL-MCNC: 8.5 MG/DL — SIGNIFICANT CHANGE UP (ref 8.4–10.5)
CHLORIDE SERPL-SCNC: 111 MMOL/L — HIGH (ref 98–107)
CO2 SERPL-SCNC: 14 MMOL/L — LOW (ref 22–31)
CREAT SERPL-MCNC: 0.99 MG/DL — SIGNIFICANT CHANGE UP (ref 0.5–1.3)
GLUCOSE SERPL-MCNC: 91 MG/DL — SIGNIFICANT CHANGE UP (ref 70–99)
HCT VFR BLD CALC: 26.2 % — LOW (ref 39–50)
HGB BLD-MCNC: 8.5 G/DL — LOW (ref 13–17)
INR BLD: 1.32 RATIO — HIGH (ref 0.88–1.16)
MAGNESIUM SERPL-MCNC: 2 MG/DL — SIGNIFICANT CHANGE UP (ref 1.6–2.6)
MCHC RBC-ENTMCNC: 29.5 PG — SIGNIFICANT CHANGE UP (ref 27–34)
MCHC RBC-ENTMCNC: 32.4 GM/DL — SIGNIFICANT CHANGE UP (ref 32–36)
MCV RBC AUTO: 91 FL — SIGNIFICANT CHANGE UP (ref 80–100)
NRBC # BLD: 0 /100 WBCS — SIGNIFICANT CHANGE UP
NRBC # FLD: 0 K/UL — SIGNIFICANT CHANGE UP
OSMOLALITY UR: 674 MOSM/KG — SIGNIFICANT CHANGE UP (ref 50–1200)
PHOSPHATE SERPL-MCNC: 3 MG/DL — SIGNIFICANT CHANGE UP (ref 2.5–4.5)
PLATELET # BLD AUTO: 120 K/UL — LOW (ref 150–400)
POTASSIUM SERPL-MCNC: 3.7 MMOL/L — SIGNIFICANT CHANGE UP (ref 3.5–5.3)
POTASSIUM SERPL-SCNC: 3.7 MMOL/L — SIGNIFICANT CHANGE UP (ref 3.5–5.3)
PROT SERPL-MCNC: 6 G/DL — SIGNIFICANT CHANGE UP (ref 6–8.3)
PROTHROM AB SERPL-ACNC: 15 SEC — HIGH (ref 10.6–13.6)
RBC # BLD: 2.88 M/UL — LOW (ref 4.2–5.8)
RBC # FLD: 20.9 % — HIGH (ref 10.3–14.5)
SODIUM SERPL-SCNC: 137 MMOL/L — SIGNIFICANT CHANGE UP (ref 135–145)
VIT D25+D1,25 OH+D1,25 PNL SERPL-MCNC: 25.9 PG/ML — SIGNIFICANT CHANGE UP (ref 19.9–79.3)
WBC # BLD: 4.6 K/UL — SIGNIFICANT CHANGE UP (ref 3.8–10.5)
WBC # FLD AUTO: 4.6 K/UL — SIGNIFICANT CHANGE UP (ref 3.8–10.5)

## 2022-01-26 PROCEDURE — 99233 SBSQ HOSP IP/OBS HIGH 50: CPT | Mod: GC

## 2022-01-26 PROCEDURE — 99221 1ST HOSP IP/OBS SF/LOW 40: CPT

## 2022-01-26 PROCEDURE — 99232 SBSQ HOSP IP/OBS MODERATE 35: CPT | Mod: GC

## 2022-01-26 RX ORDER — ENOXAPARIN SODIUM 100 MG/ML
40 INJECTION SUBCUTANEOUS DAILY
Refills: 0 | Status: COMPLETED | OUTPATIENT
Start: 2022-01-26 | End: 2022-01-26

## 2022-01-26 RX ORDER — SPIRONOLACTONE 25 MG/1
50 TABLET, FILM COATED ORAL DAILY
Refills: 0 | Status: DISCONTINUED | OUTPATIENT
Start: 2022-01-26 | End: 2022-01-27

## 2022-01-26 RX ORDER — SODIUM BICARBONATE 1 MEQ/ML
1300 SYRINGE (ML) INTRAVENOUS THREE TIMES A DAY
Refills: 0 | Status: DISCONTINUED | OUTPATIENT
Start: 2022-01-26 | End: 2022-01-27

## 2022-01-26 RX ORDER — ZINC SULFATE TAB 220 MG (50 MG ZINC EQUIVALENT) 220 (50 ZN) MG
220 TAB ORAL DAILY
Refills: 0 | Status: DISCONTINUED | OUTPATIENT
Start: 2022-01-26 | End: 2022-01-27

## 2022-01-26 RX ADMIN — Medication 650 MILLIGRAM(S): at 06:42

## 2022-01-26 RX ADMIN — LACTULOSE 45 GRAM(S): 10 SOLUTION ORAL at 18:28

## 2022-01-26 RX ADMIN — Medication 1000 UNIT(S): at 18:28

## 2022-01-26 RX ADMIN — PANTOPRAZOLE SODIUM 40 MILLIGRAM(S): 20 TABLET, DELAYED RELEASE ORAL at 06:42

## 2022-01-26 RX ADMIN — Medication 1300 MILLIGRAM(S): at 21:46

## 2022-01-26 RX ADMIN — Medication 650 MILLIGRAM(S): at 13:08

## 2022-01-26 RX ADMIN — LACTULOSE 45 GRAM(S): 10 SOLUTION ORAL at 01:41

## 2022-01-26 RX ADMIN — LACTULOSE 45 GRAM(S): 10 SOLUTION ORAL at 06:42

## 2022-01-26 RX ADMIN — ENOXAPARIN SODIUM 40 MILLIGRAM(S): 100 INJECTION SUBCUTANEOUS at 13:03

## 2022-01-26 RX ADMIN — LACTULOSE 45 GRAM(S): 10 SOLUTION ORAL at 13:00

## 2022-01-26 RX ADMIN — Medication 1300 MILLIGRAM(S): at 18:20

## 2022-01-26 NOTE — DISCHARGE NOTE PROVIDER - NSDCMRMEDTOKEN_GEN_ALL_CORE_FT
pantoprazole 40 mg oral delayed release tablet: 1 tab(s) orally once a day  Sodium Chloride 1 g oral tablet:    cholecalciferol oral tablet: 1000 unit(s) orally once a day  sodium bicarbonate 650 mg oral tablet: 2 tab(s) orally 3 times a day  Sodium Chloride 1 g oral tablet: 1 tab(s) orally 2-3 times a week  spironolactone 25 mg oral tablet: 2 tab(s) orally once a day   cholecalciferol oral tablet: 1000 unit(s) orally once a day  sodium bicarbonate 650 mg oral tablet: 2 tab(s) orally 3 times a day  Sodium Chloride 1 g oral tablet: 1 tab(s) orally 2-3 times a week  spironolactone 25 mg oral tablet: 2 tab(s) orally once a day  spironolactone 25 mg oral tablet: 2 tab(s) orally once a day  Xifaxan 550 mg oral tablet: 1 tab(s) orally 2 times a day  Zinc-220 oral capsule: 1 cap(s) orally 2 times a day   cholecalciferol oral tablet: 1000 unit(s) orally once a day  sodium bicarbonate 650 mg oral tablet: 2 tab(s) orally 3 times a day  Sodium Chloride 1 g oral tablet: 1 tab(s) orally 2-3 times a week  spironolactone 25 mg oral tablet: 2 tab(s) orally once a day  spironolactone 25 mg oral tablet: 2 tab(s) orally once a day

## 2022-01-26 NOTE — CONSULT NOTE ADULT - SUBJECTIVE AND OBJECTIVE BOX
Patient is a 66y old  Male who presents with a chief complaint of AMS (26 Jan 2022 08:30)    HPI:  Michael Riddle is a 66M with cholangiocarcinoma (s/p resection, chemo and bile duct/L liver lobe resection + Ju en Y Hepaticojejunostomy currently off treatment), ascites receiving weekly therapeutic paracentesis (last done 1/20/2022), portal vein thrombosis off AC (due to GIB), HTN, HLD, BRIAN, and TB as a child p/w 2 days of confusion and AMS. Notably, pt had a recent admission 1/16 for AMS, eating napkins thinking they were roti, attributed to hepatic encephalopathy with elevated ammonia to 80, which downtrended to 30 s/p lactulose enema. Pt had been discharged home with hepatology follow-up but was not given home lactulose. MRCP done that admission showed large complex cystic hepatic lesion abscess vs biloma and smaller ?metastatic liver lesion. Daughter reports this time he has had 2 days of intermittent confusion. Pt lives at home with family, and was agitated at home, slurring his words. She states she feels his confusion this time is similar to his episode 1/16. She states at baseline pt is calm, Aox3, walks with rolling walker and assistance, home PT.  Pt also regularly receives therapeutic paracenteses weekly that consistently drain ~4L output (last done 1/20, next scheduled 1/27). Cholangiocarcinoma originally diagnosed in 2016, s/p resection, chemo, RT, not currently on treatment.     ED course: afebrile, VSS, WBC 6.13, ammonia 65, VBG 7.39/27. COVID positive for several weeks (eligible for non-covid admission 1/20). CT head without acute changes. Given lactulose enema x1 with improvement in mental status, now calm Aox3, daughter states is baseline.  (24 Jan 2022 21:48)    ROS: as above     PAST MEDICAL & SURGICAL HISTORY:  Liver mass    TB (tuberculosis)  age 17    HTN (hypertension)    Hypercholesterolemia    BRIAN (obstructive sleep apnea)    Cholangiocarcinoma  diagnosed 2015    Portal vein thrombosis  9/2021    Gait difficulty  ~ uses rolling walker    Encephalopathy due to ammonia    History of abdominal surgery  h/o bile duct and left lobe of liver resected and Ju En Y hepaticojejunostomy    History of liver biopsy  9/2021    History of abdominal paracentesis    FAMILY HISTORY:  Family history of diabetes mellitus (Father)        MEDICATIONS  (STANDING):  cholecalciferol 1000 Unit(s) Oral daily  influenza   Vaccine 0.5 milliLiter(s) IntraMuscular once  lactulose Retention Enema 200 Gram(s) Rectal once  lactulose Syrup 45 Gram(s) Oral every 6 hours  pantoprazole    Tablet 40 milliGRAM(s) Oral before breakfast  sodium bicarbonate 650 milliGRAM(s) Oral three times a day  sodium chloride 0.9%. 1000 milliLiter(s) (50 mL/Hr) IV Continuous <Continuous>    MEDICATIONS  (PRN):  acetaminophen     Tablet .. 650 milliGRAM(s) Oral every 6 hours PRN Temp greater or equal to 38C (100.4F), Mild Pain (1 - 3)  melatonin 3 milliGRAM(s) Oral at bedtime PRN Insomnia      Allergies    No Known Allergies    Intolerances        Vital Signs Last 24 Hrs  T(C): 36.2 (25 Jan 2022 21:52), Max: 36.3 (25 Jan 2022 14:20)  T(F): 97.1 (25 Jan 2022 21:52), Max: 97.3 (25 Jan 2022 14:20)  HR: 75 (25 Jan 2022 21:52) (74 - 75)  BP: 151/87 (25 Jan 2022 21:52) (151/87 - 155/77)  BP(mean): --  RR: 18 (25 Jan 2022 21:52) (18 - 20)  SpO2: 100% (25 Jan 2022 21:52) (100% - 100%)    PHYSICAL EXAM  General: adult in NAD  HEENT: clear oropharynx, anicteric sclera, pink conjunctiva  Neck: supple  CV: normal S1/S2 with no murmur rubs or gallops  Lungs: positive air movement b/l ant lungs, clear to auscultation, no wheezes, no rales  Abdomen: soft non-tender non-distended, no hepatosplenomegaly  Ext: no clubbing cyanosis or edema  Skin: no rashes and no petechiae  Neuro: alert and oriented X 3, none focal    LABS:                          8.5    4.60  )-----------( 120      ( 26 Jan 2022 06:34 )             26.2         Mean Cell Volume : 91.0 fL  Mean Cell Hemoglobin : 29.5 pg  Mean Cell Hemoglobin Concentration : 32.4 gm/dL  Auto Neutrophil # : x  Auto Lymphocyte # : x  Auto Monocyte # : x  Auto Eosinophil # : x  Auto Basophil # : x  Auto Neutrophil % : x  Auto Lymphocyte % : x  Auto Monocyte % : x  Auto Eosinophil % : x  Auto Basophil % : x      Serial CBC's  01-26 @ 06:34  Hct-26.2 / Hgb-8.5 / Plat-120 / RBC-2.88 / WBC-4.60  Serial CBC's  01-25 @ 06:24  Hct-23.2 / Hgb-7.9 / Plat-121 / RBC-2.63 / WBC-5.46  Serial CBC's  01-24 @ 11:26  Hct-26.1 / Hgb-8.9 / Plat-133 / RBC-2.97 / WBC-6.13      01-26    137  |  111<H>  |  29<H>  ----------------------------<  91  3.7   |  14<L>  |  0.99    Ca    8.5      26 Jan 2022 06:34  Phos  3.0     01-26  Mg     2.00     01-26    TPro  6.0  /  Alb  2.9<L>  /  TBili  1.5<H>  /  DBili  0.7<H>  /  AST  56<H>  /  ALT  51<H>  /  AlkPhos  734<H>  01-26      PT/INR - ( 26 Jan 2022 06:34 )   PT: 15.0 sec;   INR: 1.32 ratio         PTT - ( 26 Jan 2022 06:34 )  PTT:41.1 sec    Vitamin B12, Serum: 2000 pg/mL (01-25 @ 06:28)  Folate, Serum: 12.5 ng/mL (01-25 @ 06:24)              RADIOLOGY & ADDITIONAL STUDIES:

## 2022-01-26 NOTE — PROGRESS NOTE ADULT - PROBLEM SELECTOR PLAN 3
-lobular cystic collection found on R hepatic lobe with associated central biliary duct dilatation.  -possibly infection/abscess vs. intrahepatic biliary radicals vs. cystic mets  -MR abdomen showing cystic mass, abscess vs biloma, however no infectious symptoms  -pt has outpatient hepatology appt for 3/27    -c/s hepatology, appreciate recs h/o recurring ascites requiring weekly therapeutic paracentesis, likely 2/2 chronic PVT found in 9/2021 hospitalization at OSH. Last para 1/20  - low suspicion for SBP at this time, monitoring off abx   - pending LVP by procedure team, planned for tomorrow

## 2022-01-26 NOTE — PROGRESS NOTE ADULT - PROBLEM SELECTOR PLAN 5
-asymmetric 2+ leg edema L>R, per daughter this is baseline since pt had a vein surgery in L leg  -Duplex US 1/18/2022 negative for DVTs  -Would benefit from compression stocking - chronic portal vein thrombosis diagnosed in 9/2021, off therapeutic AC given h/o GI bleed

## 2022-01-26 NOTE — PROGRESS NOTE ADULT - PROBLEM SELECTOR PLAN 1
-Suspect hepatic encephalopathy given elevated ammonia (Ammonia = 65) and improvement in mental status after lactulose enema  -cause of hyperammonemia likely underlying cholangiocarcinoma  -unlikely structural given negative CT head. Unlikely infectious, no infectious sxs, fevers, or leukocytosis.   -Pt mental status now significantly improved, back at baseline    Plan:  -check TSH, B12, RPR ammonia  -standing lactulose 10g qd to start, titrate to 2-3 BMs/day  -c/w hepatology suspect hepatic encephalopathy given elevated ammonia and recent admission for the same w/ improvement after lactulose. Now also w/ some improvement   - CTH w/o acute abnormalities  - lower suspicion for infectious etiology at this time- afebrile, WBC wnl, no infectious sxs, abd nontender. infectious w/u negative thus far, pending paracentesis to r/o SBP    - cont to titrate lactulose for 2-3 BM/day   - cont to monitor off abx, low threshold to start

## 2022-01-26 NOTE — PROGRESS NOTE ADULT - PROBLEM SELECTOR PLAN 7
-chronic portal vein thrombosis off therapeutic AC given GI bleed resolved. c/w home salt tabs per nephro recs   - sodium bicarb 650 TID

## 2022-01-26 NOTE — PROGRESS NOTE ADULT - SUBJECTIVE AND OBJECTIVE BOX
[facility]   MD ALEXANDER RUBY MD, PA INJUNG KO NP    TEL:  OFFICE: [phone]  DR MATT CELL: [phone]  DR. HOANG CELL: [phone]  CHIKIS GARNICA CELL: [phone]  GRACIELA SHAH CELL :[phone]    From 5pm-7am Answering Service [phone]    -- RENAL FOLLOW UP NOTE ---Date of Service 01-26-22 @ 13:40    Patient is a 66y old  Male who presents with a chief complaint of AMS (26 Jan 2022 08:30)      Patient seen and examined at bedside. No chest pain/sob    VITALS:  T(F): 97.7 (01-26-22 @ 13:32), Max: 97.7 (01-26-22 @ 13:32)  HR: 78 (01-26-22 @ 13:32)  BP: 135/78 (01-26-22 @ 13:32)  RR: 18 (01-26-22 @ 13:32)  SpO2: 100% (01-26-22 @ 13:32)  Wt(kg): --      Weight (kg): 60.9 (01-25 @ 14:20)    PHYSICAL EXAM:  Constitutional: NAD  Neck: No JVD  Respiratory: CTAB, no wheezes, rales or rhonchi  Cardiovascular: S1, S2, RRR  Gastrointestinal: BS+, soft, NT/ND  Extremities: +1 peripheral edema    Hospital Medications:   MEDICATIONS  (STANDING):  cholecalciferol 1000 Unit(s) Oral daily  influenza   Vaccine 0.5 milliLiter(s) IntraMuscular once  lactulose Retention Enema 200 Gram(s) Rectal once  lactulose Syrup 45 Gram(s) Oral every 6 hours  pantoprazole    Tablet 40 milliGRAM(s) Oral before breakfast  sodium bicarbonate 1300 milliGRAM(s) Oral three times a day  sodium chloride 0.9%. 1000 milliLiter(s) (50 mL/Hr) IV Continuous <Continuous>      LABS:  01-26    137  |  111<H>  |  29<H>  ----------------------------<  91  3.7   |  14<L>  |  0.99    Ca    8.5      26 Jan 2022 06:34  Phos  3.0     01-26  Mg     2.00     01-26    TPro  6.0  /  Alb  2.9<L>  /  TBili  1.5<H>  /  DBili  0.7<H>  /  AST  56<H>  /  ALT  51<H>  /  AlkPhos  734<H>  01-26    Creatinine Trend: 0.99 <--, 1.10 <--, 1.05 <--    Albumin, Serum: 2.9 g/dL (01-26 @ 06:34)  Phosphorus Level, Serum: 3.0 mg/dL (01-26 @ 06:34)                              8.5    4.60  )-----------( 120      ( 26 Jan 2022 06:34 )             26.2     Urine Studies:  Urinalysis - [01-25-22 @ 10:11]      Color Yellow / Appearance Clear / SG 1.025 / pH 6.0      Gluc Negative / Ketone Negative  / Bili Negative / Urobili <2 mg/dL       Blood Negative / Protein 30 mg/dL / Leuk Est Negative / Nitrite Negative      RBC 2 / WBC 5 / Hyaline 1 / Gran  / Sq Epi  / Non Sq Epi 1 / Bacteria Negative      Ferritin 874      [01-19-22 @ 06:59]  Vitamin D (25OH) 15.9      [01-25-22 @ 10:35]  TSH 1.93      [01-19-22 @ 06:59]    HCV 0.35, Nonreact      [01-17-22 @ 09:30]    Syphilis Screen (Treponema Pallidum Ab) Negative      [01-25-22 @ 12:49]    RADIOLOGY & ADDITIONAL STUDIES:

## 2022-01-26 NOTE — PROGRESS NOTE ADULT - SUBJECTIVE AND OBJECTIVE BOX
Progress Note    22 (2d)  -----------------------------------------------------------------------------------------------------------  EDUARDO Hudson PGY1  Pager# [phone]  After 7pm, please page night float pager #[phone]  ------------------------------------------------------------------------------------------------------------    Patient is a 66y old  Male who presents with a chief complaint of AMS (2022 13:40)      Subjective / Overnight Events :  - no acute events overnight  - pt seen and examined at bedside this AM. daughter present   - per daughter, pt had ~10 BM's overnight, 1 this AM. Mental status seems to have improved, still AAOX2, still a bit confused, kept trying to pull on alarm despite daughter trying to redirect him     Additional ROS (if any):    MEDICATIONS  (STANDING):  cholecalciferol 1000 Unit(s) Oral daily  influenza   Vaccine 0.5 milliLiter(s) IntraMuscular once  lactulose Retention Enema 200 Gram(s) Rectal once  lactulose Syrup 45 Gram(s) Oral every 6 hours  pantoprazole    Tablet 40 milliGRAM(s) Oral before breakfast  sodium bicarbonate 1300 milliGRAM(s) Oral three times a day  sodium chloride 0.9%. 1000 milliLiter(s) (50 mL/Hr) IV Continuous <Continuous>    MEDICATIONS  (PRN):  acetaminophen     Tablet .. 650 milliGRAM(s) Oral every 6 hours PRN Temp greater or equal to 38C (100.4F), Mild Pain (1 - 3)  melatonin 3 milliGRAM(s) Oral at bedtime PRN Insomnia      CAPILLARY BLOOD GLUCOSE        I&O's Summary      PHYSICAL EXAM:  Vital Signs Last 24 Hrs  T(C): 36.5 (2022 13:32), Max: 36.5 (2022 13:32)  T(F): 97.7 (2022 13:32), Max: 97.7 (2022 13:32)  HR: 78 (2022 13:32) (75 - 78)  BP: 135/78 (2022 13:32) (135/78 - 151/87)  BP(mean): --  RR: 18 (2022 13:32) (18 - 18)  SpO2: 100% (2022 13:32) (100% - 100%)    General: NAD, non-toxic appearing   HEENT: PERRLA, EOMi, no scleral icterus  CV: RRR, normal S1 and S2, no m/r/g  Lungs: normal respiratory effort. CTAB, no wheezes, rales, or rhonchi  Abd: soft, nontender, nondistended  Ext: no edema, 2+ peripheral pulses   Pysch: AAOx3, appropriate affect   Neuro: grossly non-focal, moving all extremities spontaneously   Skin: no rashes or lesions     LABS:                          8.5    4.60  )-----------( 120      ( 2022 06:34 )             26.2       WBC Trend: 4.60<--, 5.46<--, 6.13<--  Hb Trend: 8.5<--, 7.9<--, 8.9<--        137  |  111<H>  |  29<H>  ----------------------------<  91  3.7   |  14<L>  |  0.99    Ca    8.5      2022 06:34  Phos  3.0       Mg     2.00         TPro  6.0  /  Alb  2.9<L>  /  TBili  1.5<H>  /  DBili  0.7<H>  /  AST  56<H>  /  ALT  51<H>  /  AlkPhos  734<H>      PT/INR - ( 2022 06:34 )   PT: 15.0 sec;   INR: 1.32 ratio         PTT - ( 2022 06:34 )  PTT:41.1 sec      Urinalysis Basic - ( 2022 10:11 )    Color: Yellow / Appearance: Clear / S.025 / pH: x  Gluc: x / Ketone: Negative  / Bili: Negative / Urobili: <2 mg/dL   Blood: x / Protein: 30 mg/dL / Nitrite: Negative   Leuk Esterase: Negative / RBC: 2 /HPF / WBC 5 /HPF   Sq Epi: x / Non Sq Epi: 1 /HPF / Bacteria: Negative        Culture - Blood (collected 2022 12:20)  Source: .Blood Blood-Peripheral  Preliminary Report (2022 13:02):    No growth to date.    Culture - Blood (collected 2022 12:20)  Source: .Blood Blood-Peripheral  Preliminary Report (2022 13:02):    No growth to date.        RADIOLOGY & ADDITIONAL TESTS: Reviewed Progress Note    22 (2d)  -----------------------------------------------------------------------------------------------------------  EDUARDO Hudson PGY1  Pager# [phone]  After 7pm, please page night float pager #[phone]  ------------------------------------------------------------------------------------------------------------    Patient is a 66y old  Male who presents with a chief complaint of AMS (2022 13:40)      Subjective / Overnight Events :  - no acute events overnight  - pt seen and examined at bedside this AM. daughter present   - per daughter, pt had ~10 BM's overnight, 1 this AM. Mental status seems to have improved, still AAOX2, still a bit confused, kept trying to pull on alarm despite daughter trying to redirect him   - spoke to procedure team, will try to do LVP tomorrow    Additional ROS (if any):    MEDICATIONS  (STANDING):  cholecalciferol 1000 Unit(s) Oral daily  influenza   Vaccine 0.5 milliLiter(s) IntraMuscular once  lactulose Retention Enema 200 Gram(s) Rectal once  lactulose Syrup 45 Gram(s) Oral every 6 hours  pantoprazole    Tablet 40 milliGRAM(s) Oral before breakfast  sodium bicarbonate 1300 milliGRAM(s) Oral three times a day  sodium chloride 0.9%. 1000 milliLiter(s) (50 mL/Hr) IV Continuous <Continuous>    MEDICATIONS  (PRN):  acetaminophen     Tablet .. 650 milliGRAM(s) Oral every 6 hours PRN Temp greater or equal to 38C (100.4F), Mild Pain (1 - 3)  melatonin 3 milliGRAM(s) Oral at bedtime PRN Insomnia      CAPILLARY BLOOD GLUCOSE        I&O's Summary      PHYSICAL EXAM:  Vital Signs Last 24 Hrs  T(C): 36.5 (2022 13:32), Max: 36.5 (2022 13:32)  T(F): 97.7 (2022 13:32), Max: 97.7 (2022 13:32)  HR: 78 (2022 13:32) (75 - 78)  BP: 135/78 (2022 13:32) (135/78 - 151/87)  BP(mean): --  RR: 18 (2022 13:32) (18 - 18)  SpO2: 100% (2022 13:32) (100% - 100%)    General: NAD, non-toxic appearing   HEENT: PERRLA, EOMi, no scleral icterus  CV: RRR, normal S1 and S2, no m/r/g  Lungs: normal respiratory effort. CTAB, no wheezes, rales, or rhonchi  Abd: soft, nontender, nondistended  Ext: no edema, 2+ peripheral pulses   Pysch: AAOx3, appropriate affect   Neuro: grossly non-focal, moving all extremities spontaneously   Skin: no rashes or lesions     LABS:                          8.5    4.60  )-----------( 120      ( 2022 06:34 )             26.2       WBC Trend: 4.60<--, 5.46<--, 6.13<--  Hb Trend: 8.5<--, 7.9<--, 8.9<--        137  |  111<H>  |  29<H>  ----------------------------<  91  3.7   |  14<L>  |  0.99    Ca    8.5      2022 06:34  Phos  3.0       Mg     2.00         TPro  6.0  /  Alb  2.9<L>  /  TBili  1.5<H>  /  DBili  0.7<H>  /  AST  56<H>  /  ALT  51<H>  /  AlkPhos  734<H>      PT/INR - ( 2022 06:34 )   PT: 15.0 sec;   INR: 1.32 ratio         PTT - ( 2022 06:34 )  PTT:41.1 sec      Urinalysis Basic - ( 2022 10:11 )    Color: Yellow / Appearance: Clear / S.025 / pH: x  Gluc: x / Ketone: Negative  / Bili: Negative / Urobili: <2 mg/dL   Blood: x / Protein: 30 mg/dL / Nitrite: Negative   Leuk Esterase: Negative / RBC: 2 /HPF / WBC 5 /HPF   Sq Epi: x / Non Sq Epi: 1 /HPF / Bacteria: Negative        Culture - Blood (collected 2022 12:20)  Source: .Blood Blood-Peripheral  Preliminary Report (2022 13:02):    No growth to date.    Culture - Blood (collected 2022 12:20)  Source: .Blood Blood-Peripheral  Preliminary Report (2022 13:02):    No growth to date.        RADIOLOGY & ADDITIONAL TESTS: Reviewed

## 2022-01-26 NOTE — PROGRESS NOTE ADULT - PROBLEM SELECTOR PLAN 8
Diet: no beef, DASH  DVT ppx: Lovenox 40mg     [ ] PT eval ordered, can dc if recent PT eval 1/19 recommending home PT is sufficient per hospital protocol  -Pt already has home PT set up, rolling walker at home    Dispo: anticipate discharge home 1/25 as pt now mentating at baseline after receiving lactulose  GOC: FULL CODE, daughter is HCP -asymmetric 2+ leg edema L>R, per daughter this is baseline since pt had a vein surgery in L leg  -Duplex US 1/18/2022 negative for DVTs  -Would benefit from compression stocking

## 2022-01-26 NOTE — DISCHARGE NOTE PROVIDER - DETAILS OF MALNUTRITION DIAGNOSIS/DIAGNOSES
This patient has been assessed with a concern for Malnutrition and was treated during this hospitalization for the following Nutrition diagnosis/diagnoses:     -  01/27/2022: Severe protein-calorie malnutrition

## 2022-01-26 NOTE — PROGRESS NOTE ADULT - PROBLEM SELECTOR PLAN 4
-Klatskin's tumor dx'd in 2015, s/p partial hepatectomy including the left lobe and gallbladder, removal of the extrahepatic bile duct, hepaticojejunostomy to the Ju-en-Y, reconstruction of the portal vein with saphenous vein patch (2016)  -intraabdominal recurrence was given gemcitabine/abraxane/RT but currently off of treatment  -seen by onc consults 1/16, recommended MRI but no plans for inpatient treatment  -MRCP 1/18 showing liver lesion ?metastasis  -follow with outpatient onc Dr. Andrew Mosquera, last seen 1/19    -c/w heme/onc tomorrow AM Klatskin's tumor dx'd in 2015, s/p partial hepatectomy including the left lobe and gallbladder, removal of the extrahepatic bile duct, hepaticojejunostomy to the Ju-en-Y, and reconstruction of the portal vein with saphenous vein patch (2016)  - received chemo in 2020 for intraabd recurrence. currently off treatment due to poor performance status   - follows Dr. Andrew Mosquera at [facility], last seen 1/19  - heme/onc consulted, appreciate recs

## 2022-01-26 NOTE — CONSULT NOTE ADULT - ASSESSMENT
66YOM with PMH of cholangiocarcinoma (s/p resection and bile duct/L liver lobe resection + Ju en Y Hepaticojejunostomy), HTN, HLD, BRIAN, and TB as a child who presents a few days after hospital discharge with AMS. Of note, patient with recent COVID infection.    With regards to cancer history, cholangiocarcinoma was diagnosed 2015. In 2016,  patient underwent partial hepatectomy including the left lobe and gallbladder, removal of the extrahepatic bile duct, hepaticojejunostomy to the Ju-en-Y, reconstruction of the portal vein with saphenous vein patch; pT3N0 at the time.   Continued on surveillance until 2020 when he had intraabd recurrence- received Ripley/Abraxane + RT- per notes it seems patient progressed on this. Per son, he did not tolerate this well due to thrombocytopenia and stopped it in 3/2021. He developed an upper abd mass. Plan was to potentially start patient on Onivyde, 5-FU, leucovorin with immunotherapy and has been off treatment since 3/2021. Patient has never received RT     CT 1/16/2022: Decreased size right mediastinal cystic mass. New lobular cystic collection occupying large portion  of the right hepatic lobe with increase central biliary duct dilatation. This could be due to infection/abscess, markedly distended intrahepatic biliary radicals or cystic metastasis. Further evaluation with contrast enhanced liver MRI is recommended. Retroperitoneal soft tissue mass not significantly changed with associated vascular encasement and luminal narrowing. Chronically thrombosed portal vein. Large volume ascites    MRCP 1/18/2022: Increased intrahepatic ductal dilatation to the level of the xavier hepatis, likely secondary to extension of the known retroperitoneal mass. A large complex cystic lesion in the right hepatic lobe is indeterminate and may represent an abscess, in the right clinical setting. A biloma is also possible. The other much smaller cystic lesion in the hepatic dome may represent a metastatic lesion. Moderate to large ascites.    Hepatology input appreciated, on lactulose for possible hepatic encephalopathy  Patient seen at bedside with daughter present. Discussed the natural history of the disease, passed treatments and current performance status. Discussed that patient will likely not be able to tolerate chemotherapy given poor performance status.   Daughter to discuss goals of care with siblings and get back to us.   No inpatient oncologic interventions  Consider pal care consult  Consider paracentesis  care as per primary team  Supportive care, pain control, Nutrition, PT, DVT ppx  Outpatient oncology f/u    Will follow. Please do not hesitate to call back with questions.     Vangie Rich MD  Medical Oncology Attending  C: [phone]

## 2022-01-26 NOTE — PROGRESS NOTE ADULT - ASSESSMENT
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 Consulted nephro for hyponatremia     A/P    Hyponatremia   etiology?  possibly hypervolemic in the setting of ascites   in the setting of poor oral intake. vs SIADH  TSH slightly high.  cortisol is normal   improved  monitor BMP     Hyperkalemia   hemolyzed   repeated K WNL     Acidosis   without anion gap  increase sodium bicarb to 1300mg tid     HTN  optimal   monitor

## 2022-01-26 NOTE — DISCHARGE NOTE PROVIDER - CARE PROVIDER_API CALL
[facility]  [address] [id number]  Phone: [phone]  Fax: [phone]  Established Patient  Follow Up Time: 1 week

## 2022-01-26 NOTE — DISCHARGE NOTE PROVIDER - NSDCCPCAREPLAN_GEN_ALL_CORE_FT
PRINCIPAL DISCHARGE DIAGNOSIS  Diagnosis: Hepatic encephalopathy  Assessment and Plan of Treatment: You were brought to the hospital for confusion, similar to your recent admission here, due to a build up of ammonia. You were given lactulose and now your confusion has resolved. A prescription for lactulose has been sent to the pharmacy, please take 30-45g every 8 hrs, and adjust as needed for 3-4 bowel movements per day. We also started you on rifaxmin 550mg twice a day and zinc 220mg twice a day, we recommend you continue these medications on discharge and follow up with your primary care doctor and hepatology. Prescriptions for these have been sent to [facility].      SECONDARY DISCHARGE DIAGNOSES  Diagnosis: Liver mass  Assessment and Plan of Treatment: You were found to have a cystic lesion in your liver during your previous admission. Hepatology reviewed this during their multidisciplinary rounds and believe it may be an abscess and may benefit from drainage by our interventional radiologists. You and your daughter have decided to not pursue this at this time and instead follow up outpatient. At this time, this appears stable and you are without fever or obvious signs of infection so drainage is not emergent. But if you are to develop any fevers or worsening confusion despite the new medications, please return to the emergency room immediately.    Diagnosis: Ascites  Assessment and Plan of Treatment: Your ascites was drained today and fluid studies were sent. At this time, it does not appear that you have an active infection, please see return to ED precautions as above.  Hepatology has started you on a new medication to help with the ascites: spironolactone 50mg once a day.   Please keep your hepatology appointment or call [phone] ([facility] at [address] [place] [address]) or [phone] ([facility] at [address] [place] [address]) to try to get an earlier appointment if needed.    Diagnosis: Metabolic acidosis  Assessment and Plan of Treatment: You were followed by your private nephrologist while in the hospital and based on your labs here, they have increased your sodium bicarb to 1350 three times a day. Please continue this for now and follow up with your nephrologist for further adjustments.

## 2022-01-26 NOTE — PROGRESS NOTE ADULT - PROBLEM SELECTOR PLAN 2
-mild hypoNa to 134, present since 1/16  -low po intake vs siADH vs liver etiology  -neph consulted, appreciate recs  -pt is on salt tabs at home, holding for now  -neph recommending sodium bicarb 650 TID found to have new large 10 x 8 x 9.8cm complex cystic mass in R hepatic lobe. more likely biloma given obstruction from RP mass   - hepatology following, plans TBD after further discussion at tumor board

## 2022-01-26 NOTE — DISCHARGE NOTE PROVIDER - NSDCFUSCHEDAPPT_GEN_ALL_CORE_FT
JESUS WALLACE ; 01/27/2022 ; NPP Rad  Opd JESUS Ramon ; 02/03/2022 ; NPP Rad  Opd JESUS Ramon ; 02/17/2022 ; NPP Hepatology [facility] Melody JESUS WALLACE ; 02/03/2022 ; NPP Rad  Opd Lkvl  JESUS WALLACE ; 02/17/2022 ; NPP Hepatology [address]

## 2022-01-26 NOTE — PROGRESS NOTE ADULT - SUBJECTIVE AND OBJECTIVE BOX
HEPATOLOGY Progress note    PROGRESS NOTE:     Patient is a 66y old  Male who presents with a chief complaint of AMS (2022 07:55)      SUBJECTIVE / OVERNIGHT EVENTS:  No acute events overnight. Daughter at bedside reports patient having BMs q2h. Patient remains confused with asterixis on exam. A&Ox2.    ADDITIONAL REVIEW OF SYSTEMS:    MEDICATIONS  (STANDING):  cholecalciferol 1000 Unit(s) Oral daily  influenza   Vaccine 0.5 milliLiter(s) IntraMuscular once  lactulose Retention Enema 200 Gram(s) Rectal once  lactulose Syrup 45 Gram(s) Oral every 6 hours  pantoprazole    Tablet 40 milliGRAM(s) Oral before breakfast  sodium bicarbonate 650 milliGRAM(s) Oral three times a day  sodium chloride 0.9%. 1000 milliLiter(s) (50 mL/Hr) IV Continuous <Continuous>    MEDICATIONS  (PRN):  acetaminophen     Tablet .. 650 milliGRAM(s) Oral every 6 hours PRN Temp greater or equal to 38C (100.4F), Mild Pain (1 - 3)  melatonin 3 milliGRAM(s) Oral at bedtime PRN Insomnia      CAPILLARY BLOOD GLUCOSE        I&O's Summary      VITALS:   T(C): 36.2 (22 @ 21:52), Max: 36.3 (22 @ 14:20)  HR: 75 (22 @ 21:52) (74 - 75)  BP: 151/87 (22 @ 21:52) (151/87 - 155/77)  RR: 18 (22 @ 21:52) (18 - 20)  SpO2: 100% (22 @ 21:52) (100% - 100%)    GENERAL: NAD, confused  HEAD:  Atraumatic, Normocephalic  EYES: EOMI, PERRLA, conjunctiva and sclera clear  ENT: Moist mucous membranes  NECK: Supple, No JVD  CHEST/LUNG: Clear to auscultation bilaterally; No rales, rhonchi, wheezing, or rubs. Unlabored respirations  HEART: Regular rate and rhythm; No murmurs, rubs, or gallops  ABDOMEN: Soft, distended, non-tender to palpation, + fluid wave  EXTREMITIES: Brisk capillary refill. 1+ LE edema on RLE, 2+ on LLE  NERVOUS SYSTEM:  A&Ox2 (person and hospital), + asterixis bilaterally  SKIN: No rashes or lesions        LABS:                        8.5    4.60  )-----------( 120      ( 2022 06:34 )             26.2         137  |  111<H>  |  29<H>  ----------------------------<  91  3.7   |  14<L>  |  0.99    Ca    8.5      2022 06:34  Phos  3.0       Mg     2.00         TPro  6.0  /  Alb  2.9<L>  /  TBili  1.5<H>  /  DBili  0.7<H>  /  AST  56<H>  /  ALT  51<H>  /  AlkPhos  734<H>      PT/INR - ( 2022 06:34 )   PT: 15.0 sec;   INR: 1.32 ratio         PTT - ( 2022 06:34 )  PTT:41.1 sec      Urinalysis Basic - ( 2022 10:11 )    Color: Yellow / Appearance: Clear / S.025 / pH: x  Gluc: x / Ketone: Negative  / Bili: Negative / Urobili: <2 mg/dL   Blood: x / Protein: 30 mg/dL / Nitrite: Negative   Leuk Esterase: Negative / RBC: 2 /HPF / WBC 5 /HPF   Sq Epi: x / Non Sq Epi: 1 /HPF / Bacteria: Negative        Culture - Blood (collected 2022 12:20)  Source: .Blood Blood-Peripheral  Preliminary Report (2022 13:02):    No growth to date.    Culture - Blood (collected 2022 12:20)  Source: .Blood Blood-Peripheral  Preliminary Report (2022 13:02):    No growth to date.        RADIOLOGY & ADDITIONAL TESTS:  Results Reviewed: MRI/MRCP, CT abdomen, CT chest  Imaging Personally Reviewed: MRI/MRCP, CT abdomen, CT chest  Electrocardiogram Personally Reviewed:

## 2022-01-26 NOTE — DISCHARGE NOTE PROVIDER - HOSPITAL COURSE
65yo M with PMH cholangiocarcinoma (s/p chemo, bile duct/L liver lobe resection + Ju en Y Hepaticojejunostomy and portal v. 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 Also with new large complex cystic lesion in R hepatic lobe found in recent admission.  Hepatology was consulted for HE as well as this new large complex cystic lesion measuring 10 x 9 x 9.8cm in R hepatic lobe. Patient was started on a lactulose regimen, rifaxamin, spironolactone, and zinc with improvement in his mental status back to baseline, confirmed by daughter at bedside. Suspicion for infection or SBP were low so patient was monitored off abx pending paracentesis. Pt follows at [facility] for oncology and they were made aware of this re-admission. Patient was presented at tumor board by hepatology and discussion favored IR drainage of lesion. Patient is now AAOx3 and is refusing the procedure at this time, as is daughter, who is also HCP. Procedure team was consulted and performed a paracentesis this AM (1/27), with total PMN <250 in ascites fluid, low concern for SBP but still pending gram stain and culture. Patient and daughter both requesting to be discharged today or they will AMA- wants to continue discussion outpatient and follow up with his oncologist. Patient is clinically improved, HE now resolved, AAOX3, and stable at this time for discharge. 67yo M with PMH cholangiocarcinoma (s/p chemo, bile duct/L liver lobe resection + Ju en Y Hepaticojejunostomy and portal v. 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 Also with new large complex cystic lesion in R hepatic lobe found in recent admission.  Hepatology was consulted for HE as well as this new large complex cystic lesion measuring 10 x 9 x 9.8cm in R hepatic lobe. Patient was started on a lactulose regimen, rifaxamin, spironolactone, and zinc with improvement in his mental status back to baseline, confirmed by daughter at bedside. Suspicion for infection or SBP were low so patient was monitored off abx pending paracentesis. Pt follows at [facility] for oncology and they were made aware of this re-admission. Patient was presented at tumor board by hepatology and discussion favored possible abscess, recommended IR drainage of lesion. Patient is now AAOx3 and is refusing the procedure at this time, as is daughter, who is also HCP. Procedure team was consulted and performed a paracentesis this AM (1/27), with total PMN <250 in ascites fluid, no SBP, gram stain and culture still pending. Patient and daughter both requesting to be discharged today or they will AMA- wants to continue discussion outpatient and follow up with his oncologist. Patient is clinically improved, HE now resolved, AAOX3, and stable at this time for discharge. 67yo M with PMH cholangiocarcinoma (s/p chemo, bile duct/L liver lobe resection + Ju en Y Hepaticojejunostomy and portal v. 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 Also with new large complex cystic lesion in R hepatic lobe found in recent admission.  Hepatology was consulted for HE as well as this new large complex cystic lesion measuring 10 x 9 x 9.8cm in R hepatic lobe. Patient was started on a lactulose regimen, rifaxamin, spironolactone, and zinc with improvement in his mental status back to baseline, confirmed by daughter at bedside. Suspicion for infection or SBP were low so patient was monitored off abx pending paracentesis. Pt follows at [facility] for oncology and they were made aware of this re-admission. Patient was presented at tumor board by hepatology and discussion favored possible abscess, recommended IR drainage of lesion. Patient is now AAOx3 and is refusing the procedure at this time, as is daughter, who is a nurse/HCP. Procedure team was consulted and performed a paracentesis this AM (1/27), with total PMN <250 in ascites fluid, no SBP per hepatology, gram stain and culture still pending. Patient and daughter both requesting to be discharged today or they will AMA- wants to continue discussion outpatient and follow up with his oncologist. Patient is now clinically improved as HE now resolved, AAOX3. Hepatic cyst/abscess is stable, without signs of infection or sepsis, drainage is not urgent or emergent at this time per hepatology- pt is stable for discharge at this time with close outpatient follow up.     Will discharge patient with lactulose 30-45g q8h to titrate for 3-4 BMs/day, rifaximin 550mg bid, zinc 220mg bid, and spironolactone 50mg qd.

## 2022-01-26 NOTE — PROGRESS NOTE ADULT - ASSESSMENT
67yo M with PMH cholangiocarcinoma (s/p chemo, RT, bile duct/L liver lobe resection + Ju en Y Hepaticojejunostomy and portal v. 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 Also with new large complex cystic lesion in R hepatic lobe found in recent admission.  65yo M with PMH cholangiocarcinoma (s/p chemo, bile duct/L liver lobe resection + Ju en Y Hepaticojejunostomy and portal v. 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 Also with new large complex cystic lesion in R hepatic lobe found in recent admission, hepatology following.

## 2022-01-26 NOTE — PROGRESS NOTE ADULT - ASSESSMENT
65yo M with history of cholangiocarcinoma [s/p partial hepatectomy, cholecytectomy with bile duct resection, Ju en Y hepaticojejunostomy and adjuvant chemotherapy (last chemo reportedly 3/2021)], portal vein thrombosis (dx 9/2021, off AC 2/2 hx of GIB), HTN, BRIAN and childhood TB presenting with 2 days of confusion, improved with lactulose enema. Hepatology consulted for question of hepatic encephalopathy and intrahepatic ductal dilation.      //Encephalopathy  -Likely HE given improvement in mental status with lactulose  -Would continue lactulose, patient had multiple BMs overnight, titrate lactulose to 3BM/day  -Infectious work-up ongoing, negative so far, pending paracentesis today    //Ascites  -SAAG 2.9 (1/16/22 para), consistent with portal HTN  -Likely 2/2 chronic portal vein thrombosis  -Receiving q1week paracentesis as outpatient  -Recommend to obtain diagnostic and therapeutic para for today  -Negative for malignant cells on prior cytology  -Will discuss role of diuretics in setting of chronic hyponatremia    //Intrahepatic ductal dilation  -Based on imaging, likely 2/2 extension of RP mass  -Unclear if intervention can be performed given surgical anatomy, reviewed with Radiology, plan for discussion at tumor board  -ALP, tbili unchanged  -Recommend daily LFTs with INR    //Hepatic cystic lesion  -First appearance 12/2021 per report from daughter  -Favor biloma in setting of obstruction 2/2 RP mass  -Interventions pending further discussion at tumor board  -Does not appear to be etiology of intrahepatic ductal dilation per MRI/MRCP report    //Cholangiocarcinoma  Oncologist, Dr. Mosquera. S/p chemotherapy and adjuvant chemotherapy.  -Per last Onc note, not a current candidate for DMT given functional status  -RP mass, PET avid on PET scan from 2020, enlarging  -Periportal LN biopsy 9/2021, + for cholangioCA  -Tumor markers in the lab  -Rec Onc eval      Pending d/w attending Dr. Reed   67yo M with history of cholangiocarcinoma [s/p partial hepatectomy, cholecytectomy with bile duct resection, Ju en Y hepaticojejunostomy and adjuvant chemotherapy (last chemo reportedly 3/2021)], portal vein thrombosis (dx 9/2021, off AC 2/2 hx of GIB), HTN, BRIAN and childhood TB presenting with 2 days of confusion, improved with lactulose enema. Hepatology consulted for question of hepatic encephalopathy and intrahepatic ductal dilation.      //Encephalopathy  -Likely HE given improvement in mental status with lactulose  -Would continue lactulose, patient had multiple BMs overnight, titrate lactulose to 3BM/day  -Infectious work-up ongoing, negative so far, pending paracentesis today  -Please start rifaximin 550mg BID  -Please start Zn Sulfate daily    //Ascites  -SAAG 2.9 (1/16/22 para), consistent with portal HTN  -Likely 2/2 chronic portal vein thrombosis  -Receiving q1week paracentesis as outpatient  -Recommend to obtain diagnostic and therapeutic para for today  -Negative for malignant cells on prior cytology  -Please start spironolactone 50mg qd  -Will hold off on lasix given hyponatremia history    //Intrahepatic ductal dilation  -Based on imaging, likely 2/2 extension of RP mass  -Unclear if intervention can be performed given surgical anatomy, reviewed with Radiology, plan for discussion at tumor board  -ALP, tbili unchanged  -Recommend daily LFTs with INR    //Hepatic cystic lesion  -First appearance 12/2021 per report from daughter  -Favor biloma in setting of obstruction 2/2 RP mass  -Interventions pending further discussion at tumor board  -Does not appear to be etiology of intrahepatic ductal dilation per MRI/MRCP report    //Cholangiocarcinoma  Oncologist, Dr. Mosquera. S/p chemotherapy and adjuvant chemotherapy.  -Per last Onc note, not a current candidate for DMT given functional status  -RP mass, PET avid on PET scan from 2020, enlarging  -Periportal LN biopsy 9/2021, + for cholangioCA  -Tumor markers in the lab  -Rec Onc karin [place]/w attending Dr. Reed   65yo M with history of cholangiocarcinoma [s/p partial hepatectomy, cholecytectomy with bile duct resection, Ju en Y hepaticojejunostomy and adjuvant chemotherapy (last chemo reportedly 3/2021)], portal vein thrombosis (dx 9/2021, off AC 2/2 hx of GIB), HTN, BRIAN and childhood TB presenting with 2 days of confusion, improved with lactulose enema. Hepatology consulted for question of hepatic encephalopathy and intrahepatic ductal dilation.      //Encephalopathy  -Likely HE given improvement in mental status with lactulose  -Would continue lactulose, patient had multiple BMs overnight, titrate lactulose to 3BM/day  -Infectious work-up ongoing, negative so far, pending paracentesis tomorrow  -Please start rifaximin 550mg BID  -Please start Zn Sulfate daily    //Ascites  -SAAG 2.9 (1/16/22 para), consistent with portal HTN  -Likely 2/2 chronic portal vein thrombosis  -Receiving q1week paracentesis as outpatient  -Recommend to obtain diagnostic and therapeutic para for tomorrow  -Negative for malignant cells on prior cytology  -Please start spironolactone 50mg qd  -Will hold off on lasix given hyponatremia history    //Intrahepatic ductal dilation  -Based on imaging, likely 2/2 extension of RP mass  -Unclear if intervention can be performed given surgical anatomy, reviewed with Radiology, plan for discussion at tumor board  -ALP, tbili unchanged  -Recommend daily LFTs with INR    //Hepatic cystic lesion  -First appearance 12/2021 per report from daughter  -Favor biloma in setting of obstruction 2/2 RP mass  -Interventions pending further discussion at tumor board  -Does not appear to be etiology of intrahepatic ductal dilation per MRI/MRCP report    //Cholangiocarcinoma  Oncologist, Dr. Mosquera. S/p chemotherapy and adjuvant chemotherapy.  -Per last Onc note, not a current candidate for DMT given functional status  -RP mass, PET avid on PET scan from 2020, enlarging  -Periportal LN biopsy 9/2021, + for cholangioCA  -Tumor markers in the lab  -Rec Onc karin      d/w attending Dr. Reed

## 2022-01-27 ENCOUNTER — APPOINTMENT (OUTPATIENT)
Dept: ULTRASOUND IMAGING | Facility: IMAGING CENTER | Age: 67
End: 2022-01-27

## 2022-01-27 ENCOUNTER — TRANSCRIPTION ENCOUNTER (OUTPATIENT)
Age: 67
End: 2022-01-27

## 2022-01-27 ENCOUNTER — RESULT REVIEW (OUTPATIENT)
Age: 67
End: 2022-01-27

## 2022-01-27 VITALS — WEIGHT: 134.26 LBS

## 2022-01-27 LAB
AFP-TM SERPL-MCNC: <1.8 NG/ML — SIGNIFICANT CHANGE UP
ALBUMIN FLD-MCNC: 0.6 G/DL — SIGNIFICANT CHANGE UP
ALBUMIN SERPL ELPH-MCNC: 3 G/DL — LOW (ref 3.3–5)
ALP SERPL-CCNC: 738 U/L — HIGH (ref 40–120)
ALT FLD-CCNC: 48 U/L — HIGH (ref 4–41)
ANION GAP SERPL CALC-SCNC: 12 MMOL/L — SIGNIFICANT CHANGE UP (ref 7–14)
APTT BLD: 39.1 SEC — HIGH (ref 27–36.3)
AST SERPL-CCNC: 48 U/L — HIGH (ref 4–40)
B PERT IGG+IGM PNL SER: ABNORMAL
BASOPHILS # BLD AUTO: 0.04 K/UL — SIGNIFICANT CHANGE UP (ref 0–0.2)
BASOPHILS NFR BLD AUTO: 0.8 % — SIGNIFICANT CHANGE UP (ref 0–2)
BILIRUB SERPL-MCNC: 1.5 MG/DL — HIGH (ref 0.2–1.2)
BUN SERPL-MCNC: 28 MG/DL — HIGH (ref 7–23)
CALCIUM SERPL-MCNC: 8.6 MG/DL — SIGNIFICANT CHANGE UP (ref 8.4–10.5)
CANCER AG19-9 SERPL-ACNC: 1582 U/ML — HIGH
CHLORIDE SERPL-SCNC: 114 MMOL/L — HIGH (ref 98–107)
CO2 SERPL-SCNC: 15 MMOL/L — LOW (ref 22–31)
COLOR FLD: YELLOW
CREAT SERPL-MCNC: 0.97 MG/DL — SIGNIFICANT CHANGE UP (ref 0.5–1.3)
EOSINOPHIL # BLD AUTO: 0.38 K/UL — SIGNIFICANT CHANGE UP (ref 0–0.5)
EOSINOPHIL # FLD: 0 % — SIGNIFICANT CHANGE UP
EOSINOPHIL NFR BLD AUTO: 7.8 % — HIGH (ref 0–6)
FLUID INTAKE SUBSTANCE CLASS: SIGNIFICANT CHANGE UP
FLUID SEGMENTED GRANULOCYTES: 25 % — SIGNIFICANT CHANGE UP
FOLATE+VIT B12 SERBLD-IMP: 0 % — SIGNIFICANT CHANGE UP
GLUCOSE FLD-MCNC: 118 MG/DL — SIGNIFICANT CHANGE UP
GLUCOSE SERPL-MCNC: 83 MG/DL — SIGNIFICANT CHANGE UP (ref 70–99)
GRAM STN FLD: SIGNIFICANT CHANGE UP
HCT VFR BLD CALC: 24.8 % — LOW (ref 39–50)
HGB BLD-MCNC: 8 G/DL — LOW (ref 13–17)
IANC: 3.01 K/UL — SIGNIFICANT CHANGE UP (ref 1.5–8.5)
IMM GRANULOCYTES NFR BLD AUTO: 0.6 % — SIGNIFICANT CHANGE UP (ref 0–1.5)
INR BLD: 1.39 RATIO — HIGH (ref 0.88–1.16)
LDH SERPL L TO P-CCNC: 61 U/L — SIGNIFICANT CHANGE UP
LYMPHOCYTES # BLD AUTO: 0.79 K/UL — LOW (ref 1–3.3)
LYMPHOCYTES # BLD AUTO: 16.3 % — SIGNIFICANT CHANGE UP (ref 13–44)
LYMPHOCYTES # FLD: 6 % — SIGNIFICANT CHANGE UP
MAGNESIUM SERPL-MCNC: 2.1 MG/DL — SIGNIFICANT CHANGE UP (ref 1.6–2.6)
MCHC RBC-ENTMCNC: 29.9 PG — SIGNIFICANT CHANGE UP (ref 27–34)
MCHC RBC-ENTMCNC: 32.3 GM/DL — SIGNIFICANT CHANGE UP (ref 32–36)
MCV RBC AUTO: 92.5 FL — SIGNIFICANT CHANGE UP (ref 80–100)
MESOTHL CELL # FLD: 1 % — SIGNIFICANT CHANGE UP
MONOCYTES # BLD AUTO: 0.61 K/UL — SIGNIFICANT CHANGE UP (ref 0–0.9)
MONOCYTES NFR BLD AUTO: 12.6 % — SIGNIFICANT CHANGE UP (ref 2–14)
MONOS+MACROS # FLD: 68 % — SIGNIFICANT CHANGE UP
NEUTROPHILS # BLD AUTO: 3.01 K/UL — SIGNIFICANT CHANGE UP (ref 1.8–7.4)
NEUTROPHILS NFR BLD AUTO: 61.9 % — SIGNIFICANT CHANGE UP (ref 43–77)
NRBC # BLD: 0 /100 WBCS — SIGNIFICANT CHANGE UP
NRBC # FLD: 0 K/UL — SIGNIFICANT CHANGE UP
OTHER CELLS FLD MANUAL: 0 % — SIGNIFICANT CHANGE UP
PHOSPHATE SERPL-MCNC: 3.2 MG/DL — SIGNIFICANT CHANGE UP (ref 2.5–4.5)
PLATELET # BLD AUTO: 130 K/UL — LOW (ref 150–400)
POTASSIUM SERPL-MCNC: 3.4 MMOL/L — LOW (ref 3.5–5.3)
POTASSIUM SERPL-SCNC: 3.4 MMOL/L — LOW (ref 3.5–5.3)
PROT FLD-MCNC: 1.2 G/DL — SIGNIFICANT CHANGE UP
PROT SERPL-MCNC: 6.4 G/DL — SIGNIFICANT CHANGE UP (ref 6–8.3)
PROTHROM AB SERPL-ACNC: 15.6 SEC — HIGH (ref 10.6–13.6)
RBC # BLD: 2.68 M/UL — LOW (ref 4.2–5.8)
RBC # FLD: 21.1 % — HIGH (ref 10.3–14.5)
RCV VOL RI: 1000 CELLS/UL — HIGH (ref 0–5)
SODIUM SERPL-SCNC: 141 MMOL/L — SIGNIFICANT CHANGE UP (ref 135–145)
SPECIMEN SOURCE: SIGNIFICANT CHANGE UP
TOTAL NUCLEATED CELL COUNT, BODY FLUID: 265 CELLS/UL — HIGH (ref 0–5)
TUBE TYPE: SIGNIFICANT CHANGE UP
WBC # BLD: 4.86 K/UL — SIGNIFICANT CHANGE UP (ref 3.8–10.5)
WBC # FLD AUTO: 4.86 K/UL — SIGNIFICANT CHANGE UP (ref 3.8–10.5)

## 2022-01-27 PROCEDURE — 99233 SBSQ HOSP IP/OBS HIGH 50: CPT | Mod: GC

## 2022-01-27 PROCEDURE — 99232 SBSQ HOSP IP/OBS MODERATE 35: CPT | Mod: GC

## 2022-01-27 PROCEDURE — 49083 ABD PARACENTESIS W/IMAGING: CPT | Mod: GC

## 2022-01-27 PROCEDURE — 88112 CYTOPATH CELL ENHANCE TECH: CPT | Mod: 26

## 2022-01-27 PROCEDURE — 88305 TISSUE EXAM BY PATHOLOGIST: CPT | Mod: 26

## 2022-01-27 RX ORDER — SODIUM CHLORIDE 9 MG/ML
1 INJECTION INTRAMUSCULAR; INTRAVENOUS; SUBCUTANEOUS
Refills: 0 | Status: DISCONTINUED | OUTPATIENT
Start: 2022-01-27 | End: 2022-01-27

## 2022-01-27 RX ORDER — LACTULOSE 10 G/15ML
30 SOLUTION ORAL
Qty: 1260 | Refills: 0
Start: 2022-01-27 | End: 2022-02-09

## 2022-01-27 RX ORDER — SODIUM CHLORIDE 9 MG/ML
1 INJECTION INTRAMUSCULAR; INTRAVENOUS; SUBCUTANEOUS DAILY
Refills: 0 | Status: DISCONTINUED | OUTPATIENT
Start: 2022-01-27 | End: 2022-01-27

## 2022-01-27 RX ORDER — SPIRONOLACTONE 25 MG/1
2 TABLET, FILM COATED ORAL
Qty: 0 | Refills: 0 | DISCHARGE
Start: 2022-01-27

## 2022-01-27 RX ORDER — ZINC SULFATE TAB 220 MG (50 MG ZINC EQUIVALENT) 220 (50 ZN) MG
220 TAB ORAL
Refills: 0 | Status: DISCONTINUED | OUTPATIENT
Start: 2022-01-27 | End: 2022-01-27

## 2022-01-27 RX ORDER — CHOLECALCIFEROL (VITAMIN D3) 125 MCG
1000 CAPSULE ORAL
Qty: 0 | Refills: 0 | DISCHARGE
Start: 2022-01-27

## 2022-01-27 RX ORDER — ZINC SULFATE TAB 220 MG (50 MG ZINC EQUIVALENT) 220 (50 ZN) MG
1 TAB ORAL
Qty: 60 | Refills: 0
Start: 2022-01-27 | End: 2022-02-25

## 2022-01-27 RX ORDER — ENOXAPARIN SODIUM 100 MG/ML
40 INJECTION SUBCUTANEOUS DAILY
Refills: 0 | Status: DISCONTINUED | OUTPATIENT
Start: 2022-01-27 | End: 2022-01-27

## 2022-01-27 RX ORDER — SODIUM BICARBONATE 1 MEQ/ML
2 SYRINGE (ML) INTRAVENOUS
Qty: 0 | Refills: 0 | DISCHARGE
Start: 2022-01-27

## 2022-01-27 RX ORDER — SODIUM CHLORIDE 9 MG/ML
1 INJECTION INTRAMUSCULAR; INTRAVENOUS; SUBCUTANEOUS
Qty: 0 | Refills: 0 | DISCHARGE

## 2022-01-27 RX ORDER — SODIUM BICARBONATE 1 MEQ/ML
2 SYRINGE (ML) INTRAVENOUS
Qty: 90 | Refills: 0
Start: 2022-01-27

## 2022-01-27 RX ORDER — POTASSIUM CHLORIDE 20 MEQ
40 PACKET (EA) ORAL ONCE
Refills: 0 | Status: COMPLETED | OUTPATIENT
Start: 2022-01-27 | End: 2022-01-27

## 2022-01-27 RX ORDER — SPIRONOLACTONE 25 MG/1
1 TABLET, FILM COATED ORAL
Qty: 60 | Refills: 0
Start: 2022-01-27 | End: 2022-02-25

## 2022-01-27 RX ORDER — CHOLECALCIFEROL (VITAMIN D3) 125 MCG
1000 CAPSULE ORAL
Qty: 30 | Refills: 0
Start: 2022-01-27

## 2022-01-27 RX ORDER — PANTOPRAZOLE SODIUM 20 MG/1
1 TABLET, DELAYED RELEASE ORAL
Qty: 0 | Refills: 0 | DISCHARGE

## 2022-01-27 RX ORDER — SODIUM CHLORIDE 9 MG/ML
0 INJECTION INTRAMUSCULAR; INTRAVENOUS; SUBCUTANEOUS
Qty: 0 | Refills: 0 | DISCHARGE

## 2022-01-27 RX ORDER — SPIRONOLACTONE 25 MG/1
2 TABLET, FILM COATED ORAL
Qty: 60 | Refills: 0
Start: 2022-01-27 | End: 2022-02-25

## 2022-01-27 RX ADMIN — Medication 1000 UNIT(S): at 13:08

## 2022-01-27 RX ADMIN — LACTULOSE 40 GRAM(S): 10 SOLUTION ORAL at 00:15

## 2022-01-27 RX ADMIN — Medication 1300 MILLIGRAM(S): at 21:26

## 2022-01-27 RX ADMIN — Medication 1300 MILLIGRAM(S): at 13:57

## 2022-01-27 RX ADMIN — Medication 1300 MILLIGRAM(S): at 06:41

## 2022-01-27 RX ADMIN — Medication 40 MILLIEQUIVALENT(S): at 08:59

## 2022-01-27 NOTE — PROGRESS NOTE ADULT - PROBLEM SELECTOR PLAN 9
Diet: no beef, DASH  DVT ppx: Lovenox 40mg     [ ] PT eval ordered, can dc if recent PT eval 1/19 recommending home PT is sufficient per hospital protocol  -Pt already has home PT set up, rolling walker at home    Dispo: pending hospital course  GOC: FULL CODE, daughter is HCP
Diet: no beef, DASH  DVT ppx: Lovenox 40mg     [ ] PT eval ordered, can dc if recent PT eval 1/19 recommending home PT is sufficient per hospital protocol  -Pt already has home PT set up, rolling walker at home    Dispo: pending hospital course  GOC: FULL CODE, daughter is HCP

## 2022-01-27 NOTE — PROGRESS NOTE ADULT - ASSESSMENT
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 Consulted nephro for hyponatremia     A/P    Hyponatremia   etiology?  possibly hypervolemic in the setting of ascites   in the setting of poor oral intake. vs SIADH  TSH slightly high.  cortisol is normal   improved  monitor BMP     Hyperkalemia / hypokalemia   hemolyzed   repeated K WNL   repleted with KCL   replete as needed     Acidosis   without anion gap  increase sodium bicarb to 1300mg tid     HTN  optimal   monitor

## 2022-01-27 NOTE — DIETITIAN INITIAL EVALUATION ADULT. - PROBLEM SELECTOR PLAN 4
-Kl[place]'s tumor dx'd in 2015, s/p partial hepatectomy including the left lobe and gallbladder, removal of the extrahepatic bile duct, hepaticojejunostomy to the Ju-en-Y, reconstruction of the portal vein with saphenous vein patch (2016)  -intraabdominal recurrence was given gemcitabine/abraxane/RT but currently off of treatment  -seen by onc consults 1/16, recommended MRI but no plans for inpatient treatment  -MRCP 1/18 showing liver lesion ?metastasis  -follow with outpatient onc Dr. Andrew Mosquera, last seen 1/19.

## 2022-01-27 NOTE — PROGRESS NOTE ADULT - SUBJECTIVE AND OBJECTIVE BOX
[facility] [place] [facility]   MD ALEXANDER RUBY MD, PA INJUNG KO NP    TEL:  OFFICE: [phone]  DR MATT CELL: [phone]  DR. HOANG CELL: [phone]  CHIKIS GARNICA CELL: [phone]  GRACIELA SHAH CELL :[phone]    From 5pm-7am Answering Service [phone]    -- RENAL FOLLOW UP NOTE ---Date of Service 01-27-22 @ 12:45    Patient is a 66y old  Male who presents with a chief complaint of AMS (27 Jan 2022 09:21)      Patient seen and examined at bedside. No chest pain/sob    VITALS:  T(F): 97.3 (01-27-22 @ 06:20), Max: 97.7 (01-26-22 @ 13:32)  HR: 88 (01-27-22 @ 06:20)  BP: 136/91 (01-27-22 @ 06:20)  RR: 18 (01-27-22 @ 06:20)  SpO2: 100% (01-27-22 @ 06:20)  Wt(kg): --    01-26 @ 07:01  -  01-27 @ 07:00  --------------------------------------------------------  IN: 550 mL / OUT: 3 mL / NET: 547 mL          PHYSICAL EXAM:  Constitutional: NAD  Neck: No JVD  Respiratory: CTAB, no wheezes, rales or rhonchi  Cardiovascular: S1, S2, RRR  Gastrointestinal: BS+, soft, NT/ND  Extremities: No peripheral edema    Hospital Medications:   MEDICATIONS  (STANDING):  cholecalciferol 1000 Unit(s) Oral daily  influenza   Vaccine 0.5 milliLiter(s) IntraMuscular once  lactulose Syrup 45 Gram(s) Oral every 6 hours  rifAXIMin 550 milliGRAM(s) Oral two times a day  sodium bicarbonate 1300 milliGRAM(s) Oral three times a day  sodium chloride 1 Gram(s) Oral <User Schedule>  spironolactone 50 milliGRAM(s) Oral daily  zinc sulfate 220 milliGRAM(s) Oral two times a day      LABS:  01-27    141  |  114<H>  |  28<H>  ----------------------------<  83  3.4<L>   |  15<L>  |  0.97    Ca    8.6      27 Jan 2022 06:54  Phos  3.2     01-27  Mg     2.10     01-27    TPro  6.4  /  Alb  3.0<L>  /  TBili  1.5<H>  /  DBili      /  AST  48<H>  /  ALT  48<H>  /  AlkPhos  738<H>  01-27    Creatinine Trend: 0.97 <--, 0.99 <--, 1.10 <--, 1.05 <--    Albumin, Serum: 3.0 g/dL (01-27 @ 06:54)  Phosphorus Level, Serum: 3.2 mg/dL (01-27 @ 06:54)                              8.0    4.86  )-----------( 130      ( 27 Jan 2022 06:54 )             24.8     Urine Studies:  Urinalysis - [01-25-22 @ 10:11]      Color Yellow / Appearance Clear / SG 1.025 / pH 6.0      Gluc Negative / Ketone Negative  / Bili Negative / Urobili <2 mg/dL       Blood Negative / Protein 30 mg/dL / Leuk Est Negative / Nitrite Negative      RBC 2 / WBC 5 / Hyaline 1 / Gran  / Sq Epi  / Non Sq Epi 1 / Bacteria Negative    Urine Osmolality 674      [01-26-22 @ 18:59]    Ferritin 874      [01-19-22 @ 06:59]  Vitamin D (25OH) 15.9      [01-25-22 @ 10:35]  TSH 1.93      [01-19-22 @ 06:59]    HCV 0.35, Nonreact      [01-17-22 @ 09:30]    Syphilis Screen (Treponema Pallidum Ab) Negative      [01-25-22 @ 12:49]    RADIOLOGY & ADDITIONAL STUDIES:

## 2022-01-27 NOTE — PROGRESS NOTE ADULT - PROBLEM SELECTOR PLAN 4
[place]'s tumor dx'd in 2015, s/p partial hepatectomy including the left lobe and gallbladder, removal of the extrahepatic bile duct, hepaticojejunostomy to the Ju-en-Y, and reconstruction of the portal vein with saphenous vein patch (2016)  - received chemo in 2020 for intraabd recurrence. currently off treatment due to poor performance status   - follows Dr. Andrew Mosquera at [facility], last seen 1/19  - heme/onc consulted, appreciate recs

## 2022-01-27 NOTE — PROGRESS NOTE ADULT - PROBLEM SELECTOR PLAN 3
h/o recurring ascites requiring weekly therapeutic paracentesis, likely 2/2 chronic PVT found in 9/2021 hospitalization at OSH. Last para 1/20  - low suspicion for SBP at this time, monitoring off abx, low threshold to start CTX   - started on spironolactone 50mg qd yesterday  - f/u nephro re: starting lasix given pt's h/o of hyponatremia requiring salt tabs     - pending LVP by procedure team, planned for today h/o recurring ascites requiring weekly therapeutic paracentesis, likely 2/2 chronic PVT found in 9/2021 hospitalization at OSH. Last para 1/20  - low suspicion for SBP at this time, monitoring off abx, low threshold to start CTX   - started on spironolactone 50mg qd yesterday  - per nephro: okay to start lasix if needed but may need potassium supplementation

## 2022-01-27 NOTE — PROGRESS NOTE ADULT - PROVIDER SPECIALTY LIST ADULT
Hepatology
Internal Medicine
Internal Medicine
Nephrology
Hepatology
Nephrology
Nephrology
Internal Medicine

## 2022-01-27 NOTE — PROGRESS NOTE ADULT - ATTENDING COMMENTS
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  Pt seen and examined with daughter at baseline. Had some improvement s/p BM in ED but now confused again, AOx2. No BMs since this morning.   Encephalopathy: no associated focal deficits exam. CT head unremarkable. No evidence of infection currently. Suspect AMS related to hepatic encephalopathy especially given improvement yesterday with lactulose enema. Ammonia 170 this morning, corresponds to pt being more confused this morning. Will uptitrate lactulose for goal of 3-4 BMs. If no improvement, will consider infectious workup including ruling out SBP (low suspicion given no abdominal pain on exam) or draining cystic lesions seen in liver (on previous admission thought to be abscess vs biloma)   Of note, no evidence of cirrhosis on multiple scans reviewed (CT, MRI) thus suspect varices, ascites, hepatic encephalopathy are complications from his chronic portal vein thrombosis. He is off a/c given history of GIB. Favor watching him off given varices and risk of re-bleeding. Will need EGD to further eval. Hepatology c/s pending.  In regards to cholangiocarcinoma, last chemo 3/21. MRI on last admission demonstrated RP mass with concern for recurrence/mets. Heme/onc to eval.   Discussed with daughter at bedside
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  Pt seen and examined with daughter at baseline. MS waxing and waning. Has 10 BMs yesterday and 2 so far today. Denies abdominal pain.   Encephalopathy: no associated focal deficits exam. CT head unremarkable. No evidence of infection currently. Suspect AMS related to hepatic encephalopathy especially given improvement yesterday with lactulose enema. Continue with lactulose for goal of 2-3 BMs/day.   Appreciate hepatology recs: plan for therapeutic and diagnostic para to r/o SBP although low suspicion at this time. Will also f/u regarding intervention for Intrahepatic ductal dilation and cystic structure in liver.  Of note, no evidence of cirrhosis on multiple scans reviewed (CT, MRI) thus suspect varices, ascites, hepatic encephalopathy are complications from his chronic portal vein thrombosis. He is off a/c given history of GIB. Favor watching him off given varices and risk of re-bleeding. Will need EGD to further eval.   In regards to cholangiocarcinoma, last chemo 3/21. MRI on last admission demonstrated RP mass with concern for recurrence/mets. Heme/onc to eval.   Discussed with daughter at bedside
- hepatic encephalopathy resolved with lactulose, had mult. bowel movements.  Alert, oriented x3, mild asterixis  - case discussed at tumor board this morning - hepatic cystic lesion likely abscess. I discussed IR drainage with his daughter, who is a nurse, and she wanted to discuss with her siblings. They eventually decided to not get it done at this time.   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 Elevated LFTs likely due to the cyst/abscess  - tumor cachexia    -- continue lactulose, titrate to 2-3 soft bowel movements per day, continue rifaximin 550 mg bid and ZnSO4 220 mg bid  -- continue spironolactone 50 mg/d, f/u creatinine and Na, continue home dose of NaCl PO. If tolerated, may increase spironolactone after 3-5 days  -- discuss with oncology as outpatient if external radiation would be an option  -- stop pantoprazole  -- portal HTN: consider EGD to evaluate for esophageal varices
- hepatic encephalopathy much improved with lactulose, had mult. bowel movements. Was standing next to bed when seen, alert, oriented x 2-3, mild asterixis  - oncology saw, no candidate for further chemoterapy due to deconditioning and poor performance status  - hepatic cystic lesion: may contribute to his ascites and hepatic encephalopathy, but given that hepatic encephalopathy controlled with lactulose, and ascites can be treated, and infection risk if cyst/likely biloma is drained and need permanent catheter, would leave alone for now.    -- continue lactulose, titrate to 2-3 soft bowel movements per day  -- add rifaximin 550 mg bid  -- add ZnSO4 220 mg bid  -- start spironolactone 50 mg/d, f/u creatinine and Na, continue home dose of NaCl PO
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  Pt seen and examined with daughter at baseline. MS improved today, having regular BMs. S/p paracentesis this morning. Denies abdominal pain.   Encephalopathy: no associated focal deficits on exam. CT head unremarkable. No evidence of infection currently. Suspect AMS related to hepatic encephalopathy especially given improvement yesterday with lactulose enema. Continue with lactulose for goal of 2-3 BMs/day. S/p para. Ascitic fluid reviewed, low suspicion for SBP. Awaiting gram stain and cultures  Started on rifaximin anf spironolactone.   Appreciate hepatology recs. Rec drainage of liver abscess as it may be contributing to increased pressure in the portal venous system. Extensive discussion with daughter at bedside. She would like to hold off on drainage and would like to discuss it with her siblings.  Of note, no evidence of cirrhosis on multiple scans reviewed (CT, MRI) thus suspect varices, ascites, hepatic encephalopathy are complications from his chronic portal vein thrombosis. He is off a/c given history of GIB. Favor watching him off given varices and risk of re-bleeding.   In regards to cholangiocarcinoma, last chemo 3/21. MRI on last admission demonstrated RP mass with concern for recurrence/mets. Heme/onc eval appreciated. Pending C discussion re further chemo

## 2022-01-27 NOTE — DIETITIAN INITIAL EVALUATION ADULT. - PERTINENT MEDS FT
MEDICATIONS  (STANDING):  cholecalciferol 1000 Unit(s) Oral daily  enoxaparin Injectable 40 milliGRAM(s) SubCutaneous daily  influenza   Vaccine 0.5 milliLiter(s) IntraMuscular once  lactulose Syrup 45 Gram(s) Oral every 6 hours  rifAXIMin 550 milliGRAM(s) Oral two times a day  sodium bicarbonate 1300 milliGRAM(s) Oral three times a day  sodium chloride 1 Gram(s) Oral <User Schedule>  spironolactone 50 milliGRAM(s) Oral daily  zinc sulfate 220 milliGRAM(s) Oral two times a day

## 2022-01-27 NOTE — PROGRESS NOTE ADULT - ASSESSMENT
67yo M with PMH cholangiocarcinoma (s/p chemo, bile duct/L liver lobe resection + Ju en Y Hepaticojejunostomy and portal v. 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 Also with new large complex cystic lesion in R hepatic lobe found in recent admission, hepatology following.

## 2022-01-27 NOTE — PROCEDURE NOTE - ADDITIONAL PROCEDURE DETAILS
Invasive procedure team was consulted for diagnostic/therapeutic paracentesis due to abdominal discomfort. Explained risks (including bleeding, infection, and bowel perforation) and benefits to patient and patient expressed understanding and consented. Ultrasound was utilized and visualized ____cm Ascitic fluid pocket identified w/ no epigastric vessels visualized. No rash or vessels overlying skin site. Pts plts 130, INR 1.39, DVT PPx held over night, not on NOACs or coumadin. Sterile technique was used and 3mL of 1% lidocaine with epi was used for local anesthesia. Small incision with scalpel done and 18g Arrow Paracentesis catheter used. 3.3L of clear yellow ascitic fluid drained. Catheter removed and dressed. Pt tolerated procedure well and no complications. Lab Analysis sent. Communicated to primary team.

## 2022-01-27 NOTE — DIETITIAN INITIAL EVALUATION ADULT. - PROBLEM SELECTOR PLAN 1
-Suspect hepatic encephalopathy given elevated ammonia (Ammonia = 65) and improvement in mental status after lactulose enema  -cause of hyperammonemia likely underlying cholangiocarcinoma  -unlikely structural given negative CT head. Unlikely infectious, no infectious sxs, fevers, or leukocytosis.   -Pt mental status now significantly improved, back at baseline    Plan:  -check TSH, B12, RPR ammonia  -standing lactulose 10g qd to start, titrate to 2-3 BMs/day

## 2022-01-27 NOTE — PROGRESS NOTE ADULT - PROBLEM SELECTOR PLAN 1
suspect hepatic encephalopathy given elevated ammonia and recent admission for the same w/ improvement after lactulose. Now also w/ some improvement   - CTH w/o acute abnormalities  - lower suspicion for infectious etiology at this time- afebrile, WBC wnl, no infectious sxs, abd nontender. infectious w/u negative thus far, pending paracentesis to r/o SBP    - cont to titrate lactulose for 2-3 BM/day   - rifaximin 550mg bid   - zinc 220mg qd  - cont to monitor off abx, low threshold to start suspect hepatic encephalopathy given elevated ammonia and recent admission for the same w/ improvement after lactulose. Now also w/ some improvement   - CTH w/o acute abnormalities  - lower suspicion for infectious etiology at this time- afebrile, WBC wnl, no infectious sxs, abd nontender. infectious w/u negative thus far, pending ascitic results to r/o SBP    - cont to titrate lactulose for 2-3 BM/day   - rifaximin 550mg bid   - zinc 220mg bid  - cont to monitor off abx, low threshold to start

## 2022-01-27 NOTE — PROGRESS NOTE ADULT - ASSESSMENT
66 year old man with history of cholangiocarcinoma (s/p partial hepatectomy, cholecystectomy and bile duct resection), Ju-en-Y hepaticojejunostomy, and adjuvant chemotherapy (last chemotherapy reportedly 03/2021), portal vein thrombosis (diagnosed 9/2021, off AC secondary to history of GI bleed), HTN, BRIAN and childhood TB admitted for hepatic encephalopathy now improved with PO/rectal lactulose, found to have large right hepatic cystic lesion and intrahepatic ductal dilatation.       # Hepatic encephalopathy - Improved s/p lactulose enemas/PO lactulose; likely provoked by Immodium given to patient for antibiotic-related diarrhea.     # Ascites - Associated with portal hypertension (SAAG 2.9 on diagnostic para from 1/22/22) due to chronic portal vein thrombosis. Ascites fluid cytology negative for malignant cells on prior fluid analysis.     # Hepatic cystic lesion - Initially noted 12/2021 per daughter; favor biloma in setting of biliary obstruction from RP mass vs. abscess. Drainage of lesion recommended after multidisciplinary discussion at tumor board.      # Intrahepatic ductal dilation - Suspect secondary to extension of RP mass; afebrile without leukocytosis or hyperbilirubinemia thus maintain low suspicion for cholangitis by [place] criteria.     # Cholangiocarcinoma - (s/p partial hepatectomy, cholecystectomy and bile duct resection), Ju-en-Y hepaticojejunostomy, and adjuvant chemotherapy. RP mass on PET scan from 2020, enlarging however not a candidate for further therapy per Oncology.       Recommendations:  - IR-guided drainage of right hepatic cystic lesions/abscesses recommended but patient and family currently declining procedure as they feel patient is not symptomatic;  not emergent/urgent given patient clinically stable without signs/symptoms of sepsis and encephalopathy likely hepatic in etiology from Hepatology perspective  - cont spironolactone 50 mg qd  - cont lactulose 30-45 g every 8 hours (instructed family to titrate to 3-4 BMs/day or more if indicated)  - cont rifaximin 550 mg BID  - cont zinc sulfate 220 mg daily   - Hepatology to follow      Alyson Riddle PGY-6  Gastroenterology/Hepatology Fellow  Page #[phone]   Page #[phone] 5pm-7am on weekdays, and on weekends     66 year old man with history of cholangiocarcinoma (s/p partial hepatectomy, cholecystectomy and bile duct resection), Ju-en-Y hepaticojejunostomy, and adjuvant chemotherapy (last chemotherapy reportedly 03/2021), portal vein thrombosis (diagnosed 9/2021, off AC secondary to history of GI bleed), HTN, BRIAN and childhood TB admitted for hepatic encephalopathy now improved with PO/rectal lactulose, found to have large right hepatic cystic lesion and intrahepatic ductal dilatation.       # Hepatic encephalopathy - Improved s/p lactulose enemas/PO lactulose; likely provoked by Immodium given to patient for antibiotic-related diarrhea.     # Ascites - Associated with portal hypertension (SAAG 2.9 on diagnostic para from 1/22/22) due to chronic portal vein thrombosis. Ascites fluid cytology negative for malignant cells on prior fluid analysis.     # Hepatic cystic lesion - Initially noted 12/2021 per daughter; favor biloma in setting of biliary obstruction from RP mass vs. abscess. Drainage of lesion recommended after multidisciplinary discussion at tumor board.      # Intrahepatic ductal dilation - Suspect secondary to extension of RP mass; afebrile without leukocytosis or hyperbilirubinemia thus maintain low suspicion for cholangitis by [place] criteria.     # Cholangiocarcinoma - (s/p partial hepatectomy, cholecystectomy and bile duct resection), Ju-en-Y hepaticojejunostomy, and adjuvant chemotherapy. RP mass on PET scan from 2020, enlarging however not a candidate for further therapy per Oncology.       Recommendations:  - IR-guided drainage of right hepatic cystic lesions/abscesses recommended but patient and family currently declining procedure as they feel patient is not symptomatic;  not emergent/urgent given patient clinically stable without signs/symptoms of sepsis and encephalopathy likely hepatic in etiology from Hepatology perspective  - cont spironolactone 50 mg qd  - cont lactulose 30-45 g every 8 hours (instructed family to titrate to 3-4 BMs/day or more if indicated)  - cont rifaximin 550 mg BID  - cont zinc sulfate 220 mg daily   - can follow up with Hepatology as outpatient if procedure is desired at a later time; to make an appointment, call [phone] ([facility] at [facility] at [address] [place] [address]) or [phone] ([facility] at [address] [place] [address])       Alyson Riddle PGY-6  Gastroenterology/Hepatology Fellow  Page #[phone]   Page #[phone] 5pm-7am on weekdays, and on weekends

## 2022-01-27 NOTE — DIETITIAN INITIAL EVALUATION ADULT. - ORAL INTAKE PTA/DIET HISTORY
Patient seen for assessment. Nutrition information obtained with help from daughter at bedside. Reports patient has had poor PO intake x 1 month r/t previous hospitalization and ascites. Reports following a regular diet at home.

## 2022-01-27 NOTE — DISCHARGE NOTE NURSING/CASE MANAGEMENT/SOCIAL WORK - NSDCPEFALRISK_GEN_ALL_CORE
For information on Fall & Injury Prevention, visit: [email or web address] OR  [email or web address] OR  https://www.cdc.gov/steadi/patient.html

## 2022-01-27 NOTE — PROGRESS NOTE ADULT - SUBJECTIVE AND OBJECTIVE BOX
Chief Complaint:  Patient is a 66y old  Male who presents with a chief complaint of AMS (27 Jan 2022 12:45)      Interval Events:     Accompanied by granddaughter who provides Christiano translation: patient reports feeling well, denies confusion.  Case discussed at multidiscinplinary conference this morning and plan favored IR-guided drainage of hepatic cyst/abscess and satellite abscesses    Paracentesis performed with 3.3L removed at bedside today - total PMN < 250 in ascites fluid    Allergies:  No Known Allergies      Hospital Medications:  acetaminophen     Tablet .. 650 milliGRAM(s) Oral every 6 hours PRN  cholecalciferol 1000 Unit(s) Oral daily  enoxaparin Injectable 40 milliGRAM(s) SubCutaneous daily  influenza   Vaccine 0.5 milliLiter(s) IntraMuscular once  lactulose Syrup 45 Gram(s) Oral every 6 hours  melatonin 3 milliGRAM(s) Oral at bedtime PRN  rifAXIMin 550 milliGRAM(s) Oral two times a day  sodium bicarbonate 1300 milliGRAM(s) Oral three times a day  sodium chloride 1 Gram(s) Oral <User Schedule>  spironolactone 50 milliGRAM(s) Oral daily  zinc sulfate 220 milliGRAM(s) Oral two times a day      PMHX/PSHX:  No pertinent past medical history    Liver mass    TB (tuberculosis)    HTN (hypertension)    Hypercholesterolemia    BRIAN (obstructive sleep apnea)    Cholangiocarcinoma    Portal vein thrombosis    Gait difficulty    Encephalopathy due to ammonia    No significant past surgical history    History of abdominal surgery    History of liver biopsy    History of abdominal paracentesis    Encephalopathy due to ammonia        Family history:  No pertinent family history in first degree relatives    Family history of diabetes mellitus (Father)    Family history of hypothyroidism        ROS:     General:  No weight loss, fevers, chills, night sweats, fatigue   Eyes:  No vision changes  ENT:  No sore throat, pain, runny nose  CV:  No chest pain, palpitations, dizziness   Resp:  No SOB, cough, wheezing  GI:  See HPI  :  No burning with urination, hematuria  Muscle:  No pain, weakness  Neuro:  No weakness/tingling, memory problems  Psych:  No fatigue, insomnia, mood problems, depression  Heme:  No easy bruisability  Skin:  No rash, edema      PHYSICAL EXAM:     GENERAL:  Thin, frail, sitting up to chair, no distress   HEENT:  Cnjunctivae clear, sclera anicteric  CHEST:  No increased effort w/ respirations  HEART:  Regular rhythm & rate  ABDOMEN:  Soft, non-tender +mildly but softly distended  EXTREMITIES:  no LE  edema  SKIN:  No rash/erythema/ecchymoses/petechiae/wounds/jaundice  NEURO:  Alert, orientedx3, no asterixis     Vital Signs:  Vital Signs Last 24 Hrs  T(C): 36.6 (27 Jan 2022 13:26), Max: 36.6 (27 Jan 2022 13:26)  T(F): 97.8 (27 Jan 2022 13:26), Max: 97.8 (27 Jan 2022 13:26)  HR: 68 (27 Jan 2022 13:26) (68 - 88)  BP: 145/78 (27 Jan 2022 13:26) (136/91 - 150/80)  BP(mean): --  RR: 18 (27 Jan 2022 13:26) (18 - 18)  SpO2: 100% (27 Jan 2022 13:26) (100% - 100%)  Daily     Daily     LABS:                        8.0    4.86  )-----------( 130      ( 27 Jan 2022 06:54 )             24.8     01-27    141  |  114<H>  |  28<H>  ----------------------------<  83  3.4<L>   |  15<L>  |  0.97    Ca    8.6      27 Jan 2022 06:54  Phos  3.2     01-27  Mg     2.10     01-27    TPro  6.4  /  Alb  3.0<L>  /  TBili  1.5<H>  /  DBili  x   /  AST  48<H>  /  ALT  48<H>  /  AlkPhos  738<H>  01-27    LIVER FUNCTIONS - ( 27 Jan 2022 06:54 )  Alb: 3.0 g/dL / Pro: 6.4 g/dL / ALK PHOS: 738 U/L / ALT: 48 U/L / AST: 48 U/L / GGT: x           PT/INR - ( 27 Jan 2022 06:54 )   PT: 15.6 sec;   INR: 1.39 ratio    PTT - ( 27 Jan 2022 06:54 )  PTT:39.1 sec      Imaging:

## 2022-01-27 NOTE — PROGRESS NOTE ADULT - SUBJECTIVE AND OBJECTIVE BOX
Progress Note    01-24-22 (3d)  -----------------------------------------------------------------------------------------------------------  EDUARDO Hudson PGY1  Pager# [phone]  After 7pm, please page night float pager #[phone]  ------------------------------------------------------------------------------------------------------------    Patient is a 66y old  Male who presents with a chief complaint of AMS (27 Jan 2022 12:45)      Subjective / Overnight Events :  - No acute events overnight  - mental status improving  -     Additional ROS (if any):    MEDICATIONS  (STANDING):  cholecalciferol 1000 Unit(s) Oral daily  influenza   Vaccine 0.5 milliLiter(s) IntraMuscular once  lactulose Syrup 45 Gram(s) Oral every 6 hours  rifAXIMin 550 milliGRAM(s) Oral two times a day  sodium bicarbonate 1300 milliGRAM(s) Oral three times a day  sodium chloride 1 Gram(s) Oral <User Schedule>  spironolactone 50 milliGRAM(s) Oral daily  zinc sulfate 220 milliGRAM(s) Oral two times a day    MEDICATIONS  (PRN):  acetaminophen     Tablet .. 650 milliGRAM(s) Oral every 6 hours PRN Temp greater or equal to 38C (100.4F), Mild Pain (1 - 3)  melatonin 3 milliGRAM(s) Oral at bedtime PRN Insomnia      CAPILLARY BLOOD GLUCOSE        I&O's Summary    26 Jan 2022 07:01  -  27 Jan 2022 07:00  --------------------------------------------------------  IN: 550 mL / OUT: 3 mL / NET: 547 mL        PHYSICAL EXAM:  Vital Signs Last 24 Hrs  T(C): 36.6 (27 Jan 2022 13:26), Max: 36.6 (27 Jan 2022 13:26)  T(F): 97.8 (27 Jan 2022 13:26), Max: 97.8 (27 Jan 2022 13:26)  HR: 68 (27 Jan 2022 13:26) (68 - 88)  BP: 145/78 (27 Jan 2022 13:26) (136/91 - 150/80)  BP(mean): --  RR: 18 (27 Jan 2022 13:26) (18 - 18)  SpO2: 100% (27 Jan 2022 13:26) (100% - 100%)    General: NAD, non-toxic appearing   HEENT: PERRLA, EOMi, no scleral icterus  CV: RRR, normal S1 and S2, no m/r/g  Lungs: normal respiratory effort. CTAB, no wheezes, rales, or rhonchi  Abd: soft, nontender, nondistended  Ext: no edema, 2+ peripheral pulses   Pysch: AAOx3, appropriate affect   Neuro: grossly non-focal, moving all extremities spontaneously   Skin: no rashes or lesions     LABS:                          8.0    4.86  )-----------( 130      ( 27 Jan 2022 06:54 )             24.8       WBC Trend: 4.86<--, 4.60<--, 5.46<--  Hb Trend: 8.0<--, 8.5<--, 7.9<--, 8.9<--    01-27    141  |  114<H>  |  28<H>  ----------------------------<  83  3.4<L>   |  15<L>  |  0.97    Ca    8.6      27 Jan 2022 06:54  Phos  3.2     01-27  Mg     2.10     01-27    TPro  6.4  /  Alb  3.0<L>  /  TBili  1.5<H>  /  DBili  x   /  AST  48<H>  /  ALT  48<H>  /  AlkPhos  738<H>  01-27    PT/INR - ( 27 Jan 2022 06:54 )   PT: 15.6 sec;   INR: 1.39 ratio         PTT - ( 27 Jan 2022 06:54 )  PTT:39.1 sec            RADIOLOGY & ADDITIONAL TESTS: Reviewed Progress Note    01-24-22 (3d)  -----------------------------------------------------------------------------------------------------------  EDUARDO Hudson PGY1  Pager# [phone]  After 7pm, please page night float pager #[phone]  ------------------------------------------------------------------------------------------------------------    Patient is a 66y old  Male who presents with a chief complaint of AMS (27 Jan 2022 12:45)      Subjective / Overnight Events :  - No acute events overnight  - mental status improving  - After tumor board discussion this AM, IR and hepatology planned for IR drainage this AM, however, daughter refusing at this time. Requesting more time to decide among other family members after discussing further about risks/benefits with IR. Overally, appears that family is leaning towards comfort care and to avoid aggressive measures. Patient himself was AAOx3 this AM and was refusing procedure.     Additional ROS (if any):    MEDICATIONS  (STANDING):  cholecalciferol 1000 Unit(s) Oral daily  influenza   Vaccine 0.5 milliLiter(s) IntraMuscular once  lactulose Syrup 45 Gram(s) Oral every 6 hours  rifAXIMin 550 milliGRAM(s) Oral two times a day  sodium bicarbonate 1300 milliGRAM(s) Oral three times a day  sodium chloride 1 Gram(s) Oral <User Schedule>  spironolactone 50 milliGRAM(s) Oral daily  zinc sulfate 220 milliGRAM(s) Oral two times a day    MEDICATIONS  (PRN):  acetaminophen     Tablet .. 650 milliGRAM(s) Oral every 6 hours PRN Temp greater or equal to 38C (100.4F), Mild Pain (1 - 3)  melatonin 3 milliGRAM(s) Oral at bedtime PRN Insomnia      CAPILLARY BLOOD GLUCOSE        I&O's Summary    26 Jan 2022 07:01  -  27 Jan 2022 07:00  --------------------------------------------------------  IN: 550 mL / OUT: 3 mL / NET: 547 mL        PHYSICAL EXAM:  Vital Signs Last 24 Hrs  T(C): 36.6 (27 Jan 2022 13:26), Max: 36.6 (27 Jan 2022 13:26)  T(F): 97.8 (27 Jan 2022 13:26), Max: 97.8 (27 Jan 2022 13:26)  HR: 68 (27 Jan 2022 13:26) (68 - 88)  BP: 145/78 (27 Jan 2022 13:26) (136/91 - 150/80)  BP(mean): --  RR: 18 (27 Jan 2022 13:26) (18 - 18)  SpO2: 100% (27 Jan 2022 13:26) (100% - 100%)    General: NAD, non-toxic appearing   HEENT: EOMi, no scleral icterus  CV: RRR, normal S1 and S2, no m/r/g  Lungs: normal respiratory effort. CTAB, no wheezes, rales, or rhonchi  Abd: soft, nontender, +distended, +fluid wave   Ext: no edema, 2+ peripheral pulses   Pysch: AAOx3, flat affect   Neuro: grossly non-focal, moving all extremities spontaneously   Skin: no rashes or lesions     LABS:                          8.0    4.86  )-----------( 130      ( 27 Jan 2022 06:54 )             24.8       WBC Trend: 4.86<--, 4.60<--, 5.46<--  Hb Trend: 8.0<--, 8.5<--, 7.9<--, 8.9<--    01-27    141  |  114<H>  |  28<H>  ----------------------------<  83  3.4<L>   |  15<L>  |  0.97    Ca    8.6      27 Jan 2022 06:54  Phos  3.2     01-27  Mg     2.10     01-27    TPro  6.4  /  Alb  3.0<L>  /  TBili  1.5<H>  /  DBili  x   /  AST  48<H>  /  ALT  48<H>  /  AlkPhos  738<H>  01-27    PT/INR - ( 27 Jan 2022 06:54 )   PT: 15.6 sec;   INR: 1.39 ratio         PTT - ( 27 Jan 2022 06:54 )  PTT:39.1 sec            RADIOLOGY & ADDITIONAL TESTS: Reviewed Progress Note    01-24-22 (3d)  -----------------------------------------------------------------------------------------------------------  EDUARDO Hudson PGY1  Pager# [phone]  After 7pm, please page night float pager #[phone]  ------------------------------------------------------------------------------------------------------------    Patient is a 66y old  Male who presents with a chief complaint of AMS (27 Jan 2022 12:45)      Subjective / Overnight Events :  - No acute events overnight  - mental status improving  - After tumor board discussion this AM, IR and hepatology planned for IR drainage this AM, however, daughter refusing at this time. Requesting to speak to IR regarding further risks/benefits and to be discharged home for further discussion as outpatient with oncologist and rest of family. Over all, appears that family is leaning towards comfort care and wants to avoid aggressive measures. Patient himself was AAOx3 this AM and was refusing procedure.     Additional ROS (if any):    MEDICATIONS  (STANDING):  cholecalciferol 1000 Unit(s) Oral daily  influenza   Vaccine 0.5 milliLiter(s) IntraMuscular once  lactulose Syrup 45 Gram(s) Oral every 6 hours  rifAXIMin 550 milliGRAM(s) Oral two times a day  sodium bicarbonate 1300 milliGRAM(s) Oral three times a day  sodium chloride 1 Gram(s) Oral <User Schedule>  spironolactone 50 milliGRAM(s) Oral daily  zinc sulfate 220 milliGRAM(s) Oral two times a day    MEDICATIONS  (PRN):  acetaminophen     Tablet .. 650 milliGRAM(s) Oral every 6 hours PRN Temp greater or equal to 38C (100.4F), Mild Pain (1 - 3)  melatonin 3 milliGRAM(s) Oral at bedtime PRN Insomnia      CAPILLARY BLOOD GLUCOSE        I&O's Summary    26 Jan 2022 07:01  -  27 Jan 2022 07:00  --------------------------------------------------------  IN: 550 mL / OUT: 3 mL / NET: 547 mL        PHYSICAL EXAM:  Vital Signs Last 24 Hrs  T(C): 36.6 (27 Jan 2022 13:26), Max: 36.6 (27 Jan 2022 13:26)  T(F): 97.8 (27 Jan 2022 13:26), Max: 97.8 (27 Jan 2022 13:26)  HR: 68 (27 Jan 2022 13:26) (68 - 88)  BP: 145/78 (27 Jan 2022 13:26) (136/91 - 150/80)  BP(mean): --  RR: 18 (27 Jan 2022 13:26) (18 - 18)  SpO2: 100% (27 Jan 2022 13:26) (100% - 100%)    General: NAD, non-toxic appearing   HEENT: EOMi, no scleral icterus  CV: RRR, normal S1 and S2, no m/r/g  Lungs: normal respiratory effort. CTAB, no wheezes, rales, or rhonchi  Abd: soft, nontender, +distended, +fluid wave   Ext: no edema, 2+ peripheral pulses   Pysch: AAOx3, flat affect   Neuro: grossly non-focal, moving all extremities spontaneously   Skin: no rashes or lesions     LABS:                          8.0    4.86  )-----------( 130      ( 27 Jan 2022 06:54 )             24.8       WBC Trend: 4.86<--, 4.60<--, 5.46<--  Hb Trend: 8.0<--, 8.5<--, 7.9<--, 8.9<--    01-27    141  |  114<H>  |  28<H>  ----------------------------<  83  3.4<L>   |  15<L>  |  0.97    Ca    8.6      27 Jan 2022 06:54  Phos  3.2     01-27  Mg     2.10     01-27    TPro  6.4  /  Alb  3.0<L>  /  TBili  1.5<H>  /  DBili  x   /  AST  48<H>  /  ALT  48<H>  /  AlkPhos  738<H>  01-27    PT/INR - ( 27 Jan 2022 06:54 )   PT: 15.6 sec;   INR: 1.39 ratio         PTT - ( 27 Jan 2022 06:54 )  PTT:39.1 sec            RADIOLOGY & ADDITIONAL TESTS: Reviewed

## 2022-01-27 NOTE — PROGRESS NOTE ADULT - PROBLEM SELECTOR PLAN 2
found to have new large 10 x 8 x 9.8cm complex cystic mass in R hepatic lobe. more likely biloma given obstruction from RP mass   - hepatology following, plans TBD after further discussion at tumor board found to have new large 10 x 8 x 9.8cm complex cystic mass in R hepatic lobe. more likely biloma given obstruction from RP mass   - after tumor board discussion, hepatology and IR offering IR drainage of cystic hepatic lesion  - daughter refusing at this time, would like to talk to IR further regarding risks/benefits and take pt home first and continue discussion outpatient with oncologist and rest of family

## 2022-01-27 NOTE — PROGRESS NOTE ADULT - PROBLEM SELECTOR PLAN 6
-pt COVID positive for several weeks  -eligible for non-covid admission 1/20/2022

## 2022-01-27 NOTE — CONSULT NOTE ADULT - SUBJECTIVE AND OBJECTIVE BOX
Interventional Radiology    Evaluate for Procedure: Liver abscess drainage    HPI: 66y Male with cholangiocarcinoma status post resection and bile duct/L liver lobe resection + Ju en Y Hepaticojejunostomy, HTN, HLD, BRIAN, and former TB admitted for altered mental status. Also recent admission for AMS. At the time, an MRCP demonstrated large hepatic complex cystic lesion with additional smaller cystic foci seen throughout the liver, possibly reflective of infection, biloma or neoplasm. Patient discussed at tumor board on 1/27/2022, favor abscess. IR consulted for liver abscess drainage.    Allergies:   Medications (Abx/Cardiac/Anticoagulation/Blood Products)    enoxaparin Injectable: 40 milliGRAM(s) SubCutaneous (01-25 @ 13:21)  enoxaparin Injectable: 40 milliGRAM(s) SubCutaneous (01-26 @ 13:03)  rifAXIMin: 550 milliGRAM(s) Oral (01-27 @ 06:41)    Data:    T(C): 36.3  HR: 88  BP: 136/91  RR: 18  SpO2: 100%    -WBC 4.86 / HgB 8.0 / Hct 24.8 / Plt 130  -Na 141 / Cl 114 / BUN 28 / Glucose 83  -K 3.4 / CO2 15 / Cr 0.97  -ALT 48 / Alk Phos 738 / T.Bili 1.5  -INR 1.39 / PTT 39.1      Radiology:     MR 1/19/2022:   IMPRESSION:  Increased intrahepatic ductal dilatation to the level of the xavier hepatis, likely secondary to extension of the known retroperitoneal mass.  A large complex cystic lesion in the right hepatic lobe is indeterminate and may represent an abscess, in the right clinical setting. A biloma is also possible. The other much smaller cystic lesion in the hepatic dome may represent a metastatic lesion.  Moderate to large ascites.

## 2022-01-27 NOTE — CHART NOTE - NSCHARTNOTEFT_GEN_A_CORE
PRE-INTERVENTIONAL RADIOLOGY PROCEDURE NOTE      Patient Age: 66y    Patient Gender: Male    Procedure: IR drainage of hepatic cystic lesion    Diagnosis/Indication: R hepatic cystic lesion    Interventional Radiology Attending Physician: Dr. Patsy Bolden     Ordering Attending Physician: Dr. Maribel Blue     Pertinent Medical History: cholangiocarcioma s/p chemo, resection, HTN, HLD, BRIAN    Pertinent labs:                      8.0    4.86  )-----------( 130      ( 27 Jan 2022 06:54 )             24.8       01-27    141  |  114<H>  |  28<H>  ----------------------------<  83  3.4<L>   |  15<L>  |  0.97    Ca    8.6      27 Jan 2022 06:54  Phos  3.2     01-27  Mg     2.10     01-27    TPro  6.4  /  Alb  3.0<L>  /  TBili  1.5<H>  /  DBili  x   /  AST  48<H>  /  ALT  48<H>  /  AlkPhos  738<H>  01-27      PT/INR - ( 27 Jan 2022 06:54 )   PT: 15.6 sec;   INR: 1.39 ratio         PTT - ( 27 Jan 2022 06:54 )  PTT:39.1 sec        Patient and Family Aware ? Yes
Patient and daughter requesting discharge today. Pt prescribed rifaximin for hepatic encephalopathy on discharge, however, medication requires prior authorization and would not be approved by [facility]. Informed daughter of this, still want to take patient home. Patient is medically stable and will be discharged with lactulose and zinc for HE. Daughter is a nurse and is advised to adjust lactulose as needed for 3-4 BM's and to bring patient back to hospital or any worsening mental status despite lactulose, in addition to any fevers/chills.
Pt requiring hospital bed for home as frequent change in body position not feasible in ordinary bed given medical conditions and debility. Use of wedges and pillows have been tried. History of pressure injury, recommend gel overly to prevent skin breakdown.
Patient discussed during multidisciplinary tumor board today. Will arrange for drainage of hepatic cystic lesion/abscess and satellite abscesses by Interventional Radiology. Please keep patient NPO.    Alyson Riddle PGY-6  Gastroenterology/Hepatology Fellow  Page #[phone]   Page #[phone] 5pm-7am on weekdays, and on weekends

## 2022-01-27 NOTE — DISCHARGE NOTE NURSING/CASE MANAGEMENT/SOCIAL WORK - PATIENT PORTAL LINK FT
You can access the FollowMyHealth Patient Portal offered by [facility] by registering at the following website: [email or web address]. By joining [employer]’s FollowMyHealth portal, you will also be able to view your health information using other applications (apps) compatible with our system.

## 2022-01-27 NOTE — CONSULT NOTE ADULT - ASSESSMENT
Assessment/Plan:  66y Male with cholangiocarcinoma status post resection and bile duct/L liver lobe resection + Ju en Y Hepaticojejunostomy with large complex cystic lesion on recent MR abdomen, favored to be abscess.   -- IR will plan to perform liver abscess drainage on 1/27/2022  -- Continue NPO   -- Con't holding anticoagulation  -- please complete IR pre-procedure note  -- please place IR procedure request order under Dr. Patsy Bolden  -- IR Pager [phone]    Discussed with primary team provider Dr. Aaron Perez

## 2022-01-27 NOTE — DIETITIAN INITIAL EVALUATION ADULT. - OTHER INFO
66 year old male with a PMH of cholangiocarcinoma (s/p partial hepatectomy, cholecystectomy and bile duct resection), Ju-en-Y hepaticojejunostomy, and adjuvant chemotherapy (last chemotherapy reportedly 03/2021), HTN admitted for hepatic encephalopathy, found to have large right hepatic cystic lesion and intrahepatic ductal dilatation, s/p paracentesis with 3.3 L removed (1/27) per chart.    Daughter reports patient appetite has been improving. Reports no current GI distress or chewing/swallowing difficulties. Has no food allergies. Food preferences reviewed, patient avoids beef. Daughter unsure of UBW given ascites, but states at one point being 114 lbs. ABW is 134.2 lbs. (1/25) per chart, will monitor. Noted with +1 R ankle and +2 L ankle edema and sacrum stage 1 pressure injury per RN flow sheet.    Patient and daughter with questions regarding recommended diet to follow. Educated on small frequent meals, focusing on protein rich foods, calorie dense snacks, reviewed foods to consider and meal prep considerations, discussed oral supplementation. Patient amenable to consume ensure plant based (180 kcal, 20 g pro) to help meet nutritional needs.

## 2022-01-29 LAB
CULTURE RESULTS: SIGNIFICANT CHANGE UP
CULTURE RESULTS: SIGNIFICANT CHANGE UP
SPECIMEN SOURCE: SIGNIFICANT CHANGE UP
SPECIMEN SOURCE: SIGNIFICANT CHANGE UP

## 2022-02-01 PROBLEM — T59.891A: Chronic | Status: ACTIVE | Noted: 2022-01-25

## 2022-02-01 PROBLEM — R26.9 UNSPECIFIED ABNORMALITIES OF GAIT AND MOBILITY: Chronic | Status: ACTIVE | Noted: 2022-01-25

## 2022-02-01 LAB
CULTURE RESULTS: NO GROWTH — SIGNIFICANT CHANGE UP
NON-GYNECOLOGICAL CYTOLOGY STUDY: SIGNIFICANT CHANGE UP
SPECIMEN SOURCE: SIGNIFICANT CHANGE UP

## 2022-02-03 ENCOUNTER — APPOINTMENT (OUTPATIENT)
Dept: ULTRASOUND IMAGING | Facility: IMAGING CENTER | Age: 67
End: 2022-02-03
Payer: MEDICAID

## 2022-02-03 ENCOUNTER — OUTPATIENT (OUTPATIENT)
Dept: OUTPATIENT SERVICES | Facility: HOSPITAL | Age: 67
LOS: 1 days | End: 2022-02-03
Payer: MEDICAID

## 2022-02-03 ENCOUNTER — RESULT REVIEW (OUTPATIENT)
Age: 67
End: 2022-02-03

## 2022-02-03 DIAGNOSIS — Z98.890 OTHER SPECIFIED POSTPROCEDURAL STATES: Chronic | ICD-10-CM

## 2022-02-03 DIAGNOSIS — C22.1 INTRAHEPATIC BILE DUCT CARCINOMA: ICD-10-CM

## 2022-02-03 PROCEDURE — 49083 ABD PARACENTESIS W/IMAGING: CPT

## 2022-02-03 RX ORDER — ALBUMIN HUMAN 50 G/1000ML
5 SOLUTION INTRAVENOUS
Qty: 1 | Refills: 0 | Status: DISCONTINUED | COMMUNITY
Start: 2022-02-02 | End: 2022-02-03

## 2022-02-08 ENCOUNTER — APPOINTMENT (OUTPATIENT)
Dept: HEMATOLOGY ONCOLOGY | Facility: CLINIC | Age: 67
End: 2022-02-08
Payer: MEDICAID

## 2022-02-08 LAB
ALBUMIN SERPL ELPH-MCNC: 3.2 G/DL
ALP BLD-CCNC: 540 U/L
ALT SERPL-CCNC: 37 U/L
ANION GAP SERPL CALC-SCNC: 11 MMOL/L
APTT BLD: 38.6 SEC
AST SERPL-CCNC: 33 U/L
BASOPHILS # BLD AUTO: 0.04 K/UL
BASOPHILS NFR BLD AUTO: 0.7 %
BILIRUB SERPL-MCNC: 1.2 MG/DL
BUN SERPL-MCNC: 30 MG/DL
CALCIUM SERPL-MCNC: 8.5 MG/DL
CHLORIDE SERPL-SCNC: 103 MMOL/L
CO2 SERPL-SCNC: 16 MMOL/L
CREAT SERPL-MCNC: 1.01 MG/DL
EOSINOPHIL # BLD AUTO: 0.41 K/UL
EOSINOPHIL NFR BLD AUTO: 7.3 %
GLUCOSE SERPL-MCNC: 111 MG/DL
HCT VFR BLD CALC: 26.5 %
HGB BLD-MCNC: 8.4 G/DL
IMM GRANULOCYTES NFR BLD AUTO: 0.9 %
INR PPP: 1.23 RATIO
LYMPHOCYTES # BLD AUTO: 0.98 K/UL
LYMPHOCYTES NFR BLD AUTO: 17.4 %
MAN DIFF?: NORMAL
MCHC RBC-ENTMCNC: 30.4 PG
MCHC RBC-ENTMCNC: 31.7 GM/DL
MCV RBC AUTO: 96 FL
MONOCYTES # BLD AUTO: 0.56 K/UL
MONOCYTES NFR BLD AUTO: 9.9 %
NEUTROPHILS # BLD AUTO: 3.6 K/UL
NEUTROPHILS NFR BLD AUTO: 63.8 %
PLATELET # BLD AUTO: 150 K/UL
POTASSIUM SERPL-SCNC: 5.1 MMOL/L
PROT SERPL-MCNC: 6.3 G/DL
PT BLD: 14.4 SEC
RBC # BLD: 2.76 M/UL
RBC # FLD: 20 %
SARS-COV-2 N GENE NPH QL NAA+PROBE: DETECTED
SODIUM SERPL-SCNC: 130 MMOL/L
WBC # FLD AUTO: 5.64 K/UL

## 2022-02-08 PROCEDURE — 99213 OFFICE O/P EST LOW 20 MIN: CPT | Mod: 95

## 2022-02-08 NOTE — REASON FOR VISIT
[Home] : at home, [unfilled] , at the time of the visit. [Medical Office: ([facility])___] : at the medical office located in  [Family Member] : family member [Verbal consent obtained from patient] : the patient, [unfilled]

## 2022-02-08 NOTE — HISTORY OF PRESENT ILLNESS
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 His history dates back 6 weeks ago, December 2015, when he noted the onset of severe itching. He was seen in the emergency room on December 23 with jaundice and CAT scan revealed marked intrahepatic biliary ductal dilatation to the level of the biliary confluence. The CAT scan of the chest revealed a 6 mm linear opacity in the right upper lobe. MRI of the abdomen confirmed a biliary ductal dilatation to the level of the biliary hilum with an area of delayed enhancement measuring 3 x 1.9 cm. Thrombosis of the left portal vein was seen. 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 The patient underwent stent placement which improved his itching and jaundice. Cytology was obtained which was consistent with carcinoma.\par \par On January 5, 2016,  the patient underwent partial hepatectomy including the left lobe and  gallbladder, removal of the extrahepatic bile duct, hepaticojejunostomy to the Ju-en-Y, reconstruction of the portal vein with saphenous vein patch. The pathology revealed moderate to poorly differentiated cholangiocarcinoma involving the perihilar bile ducts. This tumor extended to the wall of the portal vein. The hepatic margin of resection was negative. 2 lymph nodes were negative. The bladder was nonmalignant. The tumor extended into the adjacent hepatic parenchyma, into the surrounding adipose tissue and extended into the interlobular bile ducts with extensive perineural invasion. The bile duct margins were negative. The tumor measured 2.9 x 2.4 cm. Staging was T3 N0.\par \par Patient did well postoperatively. Overall he lost about 20 pounds but now his appetite is improving and he feels stronger. His wound is healing well and he denies nausea, vomiting or diarrhea. There is no previous history of prior liver disease, gallbladder disease or GI disease. There is no family yesterday of malignancy. The patient works as a [employer] . [de-identified] : poorly diff cholangioca [de-identified] : Since the last visit the patient continues to require paracentesis and now has increased abdominal swelling and pain with increased right shoulder pain.  He is scheduled tomorrow to undergo paracentesis.

## 2022-02-08 NOTE — ASSESSMENT
[FreeTextEntry1] : A discussion took place with the patient his son and daughter regarding further management.  The patient has evidence of progressive abdominal metastases from his cholangiocarcinoma with liver involvement, ascites and right shoulder pain which resolves once the ascites is drained through paracentesis.  We will attempt to schedule the patient for Pleurx catheter so that the fluid can be removed at home.  The patient was also hospitalized and placed on sodium bicarb along with zinc pills which will be scheduled and ordered.  Because of his right shoulder pain tramadol will be ordered for comfort control.  We also spoke about the need for hospital bed which would be available through the hospice program and the patient and his son agreed for the patient to be evaluated for hospice.  The plan at this point is to provide maximal comfort care as he would no longer be a candidate for chemotherapy or radiation therapy.  The patient is status post diagnosis of Covid infection and has no current symptoms.  Despite the ascites the patient continues to eat as best he can and is tolerating his food.  [Palliative] : Goals of care discussed with patient: Palliative [Palliative Care Plan] : not applicable at this time

## 2022-02-08 NOTE — REVIEW OF SYSTEMS
[SOB on Exertion] : shortness of breath during exertion [Negative] : Allergic/Immunologic [FreeTextEntry7] : Patient on lactulose to control elevated ammonia levels from his liver disease.  Patient requires weekly paracentesis for ascites. [FreeTextEntry9] : Has back and right shoulder pain when fluid builds up in the abdomen.

## 2022-02-09 ENCOUNTER — OUTPATIENT (OUTPATIENT)
Dept: OUTPATIENT SERVICES | Facility: HOSPITAL | Age: 67
LOS: 1 days | End: 2022-02-09
Payer: MEDICAID

## 2022-02-09 ENCOUNTER — APPOINTMENT (OUTPATIENT)
Dept: ULTRASOUND IMAGING | Facility: IMAGING CENTER | Age: 67
End: 2022-02-09
Payer: MEDICAID

## 2022-02-09 ENCOUNTER — RESULT REVIEW (OUTPATIENT)
Age: 67
End: 2022-02-09

## 2022-02-09 DIAGNOSIS — C22.1 INTRAHEPATIC BILE DUCT CARCINOMA: ICD-10-CM

## 2022-02-09 DIAGNOSIS — Z98.890 OTHER SPECIFIED POSTPROCEDURAL STATES: Chronic | ICD-10-CM

## 2022-02-09 PROCEDURE — 49083 ABD PARACENTESIS W/IMAGING: CPT

## 2022-02-11 ENCOUNTER — TRANSCRIPTION ENCOUNTER (OUTPATIENT)
Age: 67
End: 2022-02-11

## 2022-02-14 ENCOUNTER — APPOINTMENT (OUTPATIENT)
Dept: ULTRASOUND IMAGING | Facility: IMAGING CENTER | Age: 67
End: 2022-02-14
Payer: MEDICAID

## 2022-02-14 ENCOUNTER — RESULT REVIEW (OUTPATIENT)
Age: 67
End: 2022-02-14

## 2022-02-14 ENCOUNTER — OUTPATIENT (OUTPATIENT)
Dept: OUTPATIENT SERVICES | Facility: HOSPITAL | Age: 67
LOS: 1 days | End: 2022-02-14
Payer: MEDICAID

## 2022-02-14 ENCOUNTER — RX RENEWAL (OUTPATIENT)
Age: 67
End: 2022-02-14

## 2022-02-14 DIAGNOSIS — Z98.890 OTHER SPECIFIED POSTPROCEDURAL STATES: Chronic | ICD-10-CM

## 2022-02-14 DIAGNOSIS — C22.1 INTRAHEPATIC BILE DUCT CARCINOMA: ICD-10-CM

## 2022-02-14 PROCEDURE — 49083 ABD PARACENTESIS W/IMAGING: CPT

## 2022-02-16 ENCOUNTER — APPOINTMENT (OUTPATIENT)
Dept: INTERVENTIONAL RADIOLOGY/VASCULAR | Facility: CLINIC | Age: 67
End: 2022-02-16
Payer: MEDICAID

## 2022-02-16 VITALS — HEIGHT: 64 IN | BODY MASS INDEX: 22.2 KG/M2 | WEIGHT: 130 LBS

## 2022-02-16 DIAGNOSIS — R18.8 OTHER ASCITES: ICD-10-CM

## 2022-02-16 PROCEDURE — 99214 OFFICE O/P EST MOD 30 MIN: CPT | Mod: 95

## 2022-02-16 RX ORDER — DRONABINOL 2.5 MG/1
2.5 CAPSULE ORAL 3 TIMES DAILY
Qty: 60 | Refills: 0 | Status: COMPLETED | COMMUNITY
Start: 2020-11-10 | End: 2022-02-16

## 2022-02-16 RX ORDER — POTASSIUM CHLORIDE 1500 MG/1
20 TABLET, FILM COATED, EXTENDED RELEASE ORAL
Qty: 30 | Refills: 3 | Status: COMPLETED | COMMUNITY
Start: 2020-08-05 | End: 2022-02-16

## 2022-02-16 RX ORDER — POTASSIUM CHLORIDE 1500 MG/1
20 TABLET, FILM COATED, EXTENDED RELEASE ORAL
Qty: 30 | Refills: 4 | Status: COMPLETED | COMMUNITY
Start: 2020-12-22 | End: 2022-02-16

## 2022-02-16 RX ORDER — HYDROCHLOROTHIAZIDE 25 MG/1
25 TABLET ORAL DAILY
Qty: 30 | Refills: 0 | Status: COMPLETED | COMMUNITY
Start: 2021-02-09 | End: 2022-02-16

## 2022-02-16 RX ORDER — APIXABAN 5 MG/1
5 TABLET, FILM COATED ORAL
Qty: 60 | Refills: 3 | Status: COMPLETED | COMMUNITY
Start: 2021-09-24 | End: 2022-02-16

## 2022-02-16 RX ORDER — METOCLOPRAMIDE 10 MG/1
10 TABLET ORAL 3 TIMES DAILY
Qty: 30 | Refills: 3 | Status: COMPLETED | COMMUNITY
Start: 2020-12-17 | End: 2022-02-16

## 2022-02-16 RX ORDER — POTASSIUM CHLORIDE 750 MG/1
10 TABLET, EXTENDED RELEASE ORAL DAILY
Qty: 30 | Refills: 4 | Status: COMPLETED | COMMUNITY
Start: 2021-07-06 | End: 2022-02-16

## 2022-02-16 RX ORDER — OXYCODONE HYDROCHLORIDE 15 MG/1
15 TABLET ORAL
Qty: 28 | Refills: 0 | Status: COMPLETED | COMMUNITY
Start: 2021-01-19 | End: 2022-02-16

## 2022-02-16 RX ORDER — OMEPRAZOLE 20 MG/1
20 CAPSULE, DELAYED RELEASE ORAL DAILY
Qty: 30 | Refills: 0 | Status: COMPLETED | COMMUNITY
Start: 2020-08-05 | End: 2022-02-16

## 2022-02-16 RX ORDER — POLYETHYLENE GLYCOL 3350 17 G/17G
17 POWDER, FOR SOLUTION ORAL DAILY
Qty: 1 | Refills: 0 | Status: COMPLETED | COMMUNITY
Start: 2021-02-26 | End: 2022-02-16

## 2022-02-16 RX ORDER — METOCLOPRAMIDE 10 MG/1
10 TABLET ORAL EVERY 6 HOURS
Qty: 120 | Refills: 3 | Status: COMPLETED | COMMUNITY
Start: 2021-01-07 | End: 2022-02-16

## 2022-02-16 RX ORDER — LACTULOSE 10 G/15 ML
SOLUTION, ORAL ORAL
Refills: 0 | Status: ACTIVE | COMMUNITY

## 2022-02-16 RX ORDER — SODIUM BICARBONATE 650 MG/1
TABLET ORAL
Refills: 0 | Status: ACTIVE | COMMUNITY

## 2022-02-16 NOTE — HISTORY OF PRESENT ILLNESS
[FreeTextEntry1] : 65 years old male with hx of cholangiocarcinoma diagnosed in 2015. On January 5, 2016, the patient underwent partial hepatectomy including the left lobe and gallbladder, removal of the extrahepatic bile duct, hepaticojejunostomy to the Ju-en-Y, reconstruction of the portal vein with saphenous vein patch. The pathology revealed moderate to poorly differentiated cholangiocarcinoma involving the perihilar bile ducts. This tumor extended to the wall of the portal vein. The hepatic margin of resection was negative. 2 lymph nodes were negative. The bladder was nonmalignant. Surgery done by Avelino Epps. \par pt receviced 6 cycles of chemo in 2016.\par \par Patient states he did not have to get chemotherapy. he has been getting surveillance scans. His last CT scan was 04/22/2020 which demonstrated, " Retroperitoneal soft tissue mass arising near celiac axis (2, 26) measures 4.3 x 3.8 cm, previously 4.2 x 3.3 cm. Persistent vascular encasement of the right hepatic artery, right gastric artery, left renal vein/IVC junction, and reconstructed main portal \par vein. The mass remains contiguous with the celiac axis, pancreatic neck, and efferent loop of the jejunostomy". \par \par pt EUS/FNA of abdominal mass on 3/31/20- pathology revealed scant benign cuboidal epithelium and fibrin done by  \par \par Patient is being referred by Dr. Mosquera for consultation to discuss abd mass bx. \par \par hx of TB was treated at the age of 17\par \par pt complains of having intermittent abdominal bloating(resolves after taking a walk) RUQ discomfort/ Cramp that last few seconds,. \par Patient sates she has been feeling well overall. Appetite has been good no unintentional weight loss\par Denies any recent SOB, CP, fever, chills, n/v/d. \par \par INTERVAL HISTORY 2/16/22\par Pt has been on surveillance scans and has evidence of progressive abdominal metastases from his cholangiocarcinoma and has ascites. He requires frequent paracentesis weekly. Last paracentesis was on 2/14/22, he had 5400 ml of fluid drained. He has right shoulder pain that resolves once fluid is drained. He followed up with Dr. Monae and referred the patient to IR for Pluerx Catheter placement \par \par Denies any recent SOB, CP, fever, chills, n/v/d. \par Patient sates she has been feeling well overall. Appetite has been good no unintentional weight loss.\par \par Reports having >30lbs weight loss since Dec. 2021.\par \par Due to hx of portal vein thrombosis he was on Aspirin and eliquis and stopped it in december 2021

## 2022-02-16 NOTE — REASON FOR VISIT
[Home] : at home, [unfilled] , at the time of the visit. [Medical Office: ([facility])___] : at the medical office located in  [Verbal consent obtained from patient] : the patient, [unfilled] [Family Member] : family member

## 2022-02-16 NOTE — DATA REVIEWED
[FreeTextEntry1] : 66 year old male with metastatic cholangiocarcinoma. He has malignant ascites requiring frequent paracenteses. He is no longer receiving chemotherapy or radiation therapy. He presents for consultation for Pleurex insertion.\par \par Pt sodium level noted on recent BMP. Pt is taking supplementation and will require repeat BMP prior to procedure. \par \par The full procedure of tunnelled ascites drainage catheter insertion was discussed with the patient and his daughters who translated Christiano at his request. The risks, benefits, and alternatives were discussed, including the risks of bleeding, infection, catheter malfunction. Consent obtained at the time of consultation. \par \par

## 2022-02-17 ENCOUNTER — APPOINTMENT (OUTPATIENT)
Dept: HEPATOLOGY | Facility: CLINIC | Age: 67
End: 2022-02-17

## 2022-02-17 DIAGNOSIS — C22.1 INTRAHEPATIC BILE DUCT CARCINOMA: ICD-10-CM

## 2022-02-18 ENCOUNTER — RESULT REVIEW (OUTPATIENT)
Age: 67
End: 2022-02-18

## 2022-02-18 ENCOUNTER — OUTPATIENT (OUTPATIENT)
Dept: OUTPATIENT SERVICES | Facility: HOSPITAL | Age: 67
LOS: 1 days | End: 2022-02-18
Payer: MEDICAID

## 2022-02-18 DIAGNOSIS — Z98.890 OTHER SPECIFIED POSTPROCEDURAL STATES: Chronic | ICD-10-CM

## 2022-02-18 DIAGNOSIS — R19.00 INTRA-ABDOMINAL AND PELVIC SWELLING, MASS AND LUMP, UNSPECIFIED SITE: ICD-10-CM

## 2022-02-18 DIAGNOSIS — R18.8 OTHER ASCITES: ICD-10-CM

## 2022-02-18 LAB
ANION GAP SERPL CALC-SCNC: 14 MMOL/L — SIGNIFICANT CHANGE UP (ref 7–14)
BUN SERPL-MCNC: 27 MG/DL — HIGH (ref 7–23)
CALCIUM SERPL-MCNC: 8.6 MG/DL — SIGNIFICANT CHANGE UP (ref 8.4–10.5)
CHLORIDE SERPL-SCNC: 108 MMOL/L — HIGH (ref 98–107)
CO2 SERPL-SCNC: 16 MMOL/L — LOW (ref 22–31)
CREAT SERPL-MCNC: 0.97 MG/DL — SIGNIFICANT CHANGE UP (ref 0.5–1.3)
GLUCOSE SERPL-MCNC: 74 MG/DL — SIGNIFICANT CHANGE UP (ref 70–99)
POTASSIUM SERPL-MCNC: 4.2 MMOL/L — SIGNIFICANT CHANGE UP (ref 3.5–5.3)
POTASSIUM SERPL-SCNC: 4.2 MMOL/L — SIGNIFICANT CHANGE UP (ref 3.5–5.3)
SODIUM SERPL-SCNC: 138 MMOL/L — SIGNIFICANT CHANGE UP (ref 135–145)

## 2022-02-18 PROCEDURE — 49418 INSERT TUN IP CATH PERC: CPT | Mod: GC

## 2022-02-18 RX ORDER — SODIUM CHLORIDE 9 MG/ML
1000 INJECTION, SOLUTION INTRAVENOUS
Refills: 0 | Status: DISCONTINUED | OUTPATIENT
Start: 2022-02-18 | End: 2022-03-04

## 2022-02-18 RX ORDER — ALBUMIN HUMAN 25 %
50 VIAL (ML) INTRAVENOUS
Refills: 0 | Status: DISCONTINUED | OUTPATIENT
Start: 2022-02-18 | End: 2022-03-04

## 2022-02-18 RX ORDER — OXYCODONE HYDROCHLORIDE 5 MG/1
5 TABLET ORAL ONCE
Refills: 0 | Status: DISCONTINUED | OUTPATIENT
Start: 2022-02-18 | End: 2022-02-18

## 2022-02-23 ENCOUNTER — APPOINTMENT (OUTPATIENT)
Dept: ULTRASOUND IMAGING | Facility: IMAGING CENTER | Age: 67
End: 2022-02-23

## 2022-02-25 DIAGNOSIS — R18.0 MALIGNANT ASCITES: ICD-10-CM

## 2022-02-25 DIAGNOSIS — C80.1 MALIGNANT (PRIMARY) NEOPLASM, UNSPECIFIED: ICD-10-CM

## 2022-02-26 LAB
CULTURE RESULTS: SIGNIFICANT CHANGE UP
SPECIMEN SOURCE: SIGNIFICANT CHANGE UP

## 2022-02-28 ENCOUNTER — APPOINTMENT (OUTPATIENT)
Dept: ULTRASOUND IMAGING | Facility: IMAGING CENTER | Age: 67
End: 2022-02-28

## 2022-03-08 ENCOUNTER — APPOINTMENT (OUTPATIENT)
Dept: ULTRASOUND IMAGING | Facility: IMAGING CENTER | Age: 67
End: 2022-03-08

## 2022-03-14 ENCOUNTER — APPOINTMENT (OUTPATIENT)
Dept: ULTRASOUND IMAGING | Facility: IMAGING CENTER | Age: 67
End: 2022-03-14

## 2022-11-03 NOTE — DISCHARGE NOTE PROVIDER - NPI NUMBER (FOR SYSADMIN USE ONLY) :
[[id number]],[[id number]] [[id number]],[[id number]],[[id number]] Acitretin Counseling:  I discussed with the patient the risks of acitretin including but not limited to hair loss, dry lips/skin/eyes, liver damage, hyperlipidemia, depression/suicidal ideation, photosensitivity.  Serious rare side effects can include but are not limited to pancreatitis, pseudotumor cerebri, bony changes, clot formation/stroke/heart attack.  Patient understands that alcohol is contraindicated since it can result in liver toxicity and significantly prolong the elimination of the drug by many years.

## 2023-03-16 NOTE — H&P ADULT - NSHPLANGLIMITEDENGLISH_GEN_A_CORE
CMP, CBC, lipids    Advise a tetanus booster, a Shingrix series of 2 shots,  A Prevnar 20 vaccination and a seasonal flu vaccine.   You can the vaccinations at any pharmacy.    Yes

## 2023-04-25 NOTE — PROGRESS NOTE ADULT - PROBLEM/PLAN-3
Pt scheduled for sick visit today  Please remind GM that he is due for a well visit    Requesting refill of Zyrtec
DISPLAY PLAN FREE TEXT

## 2024-09-23 NOTE — H&P PST ADULT - SURGICAL SITE INCISION
Patient examined, record reviewed, agree with findings and recommendations as documented above.   no normal/ROM intact/normal gait/strength 5/5 bilateral upper extremities/strength 5/5 bilateral lower extremities

## 2024-11-20 NOTE — H&P PST ADULT - ABILITY TO HEAR (WITH HEARING AID OR HEARING APPLIANCE IF NORMALLY USED):
[facility] PRE-ADMISSION TESTING INSTRUCTIONS    The Preadmission Testing patient is instructed accordingly using the following criteria (check applicable):    ARRIVAL INSTRUCTIONS:  [x] Parking the day of Surgery is located in the Main Entrance lot.  Upon entering the door, make an immediate right to the surgery reception desk    [x] Bring photo ID and insurance card    [] Bring in a copy of Living will or Durable Power of  papers.    [x] Please be sure to arrange for a responsible adult to provide transportation to and from the hospital    [x] Please arrange for a responsible adult to be with you for the 24 hour period post procedure due to having anesthesia    [x] If you awake am of surgery not feeling well or have temperature >100 please call [phone]    GENERAL INSTRUCTIONS:    [x], may have up to 8oz of water until 4 hours prior to surgery. No gum, no candy, no mints. NPO time:       [x] You may brush your teeth, do not swallow any toothpaste    [x] Take medications as instructed     [x] Stop herbal supplements and vitamins 5 days prior to procedure    [x] Follow preop dosing of blood thinners per physician instructions    [] Take 1/2 dose of evening insulin, but no insulin after midnight    [] No oral diabetic medications after midnight    [] If diabetic and have low blood sugar or feel symptomatic, take 1-2oz apple juice only    [] Bring inhalers day of surgery    [] Bring urine specimen day of surgery    [x] Shower or bath with soap, lather and rinse well, AM of Surgery, no lotion, powders or creams to surgical site    [x] Follow bowel prep as instructed per surgeon    [x] No tobacco products within 24 hours of surgery     [x] No alcohol or illegal drug use, marijuana included, within 24 hours of surgery.    [x] Jewelry, body piercing's, eyeglasses, contact lenses and dentures are not permitted into surgery (bring cases)      [x] Please do not wear any nail polish,  make up or hair products on the day of surgery    [x] You can expect a call the business day prior to procedure to notify you if your arrival time changes    [x] If you receive a survey after surgery we would greatly appreciate your comments    [] Parent/guardian of a minor must accompany their child and remain on the premises  the entire time they are under our care     [] Pediatric patients may bring favorite toy, blanket or comfort item with them    [] A caregiver or family member must remain with the patient during their stay if they are mentally handicapped, have dementia, disoriented or unable to use a call light or would be a safety concern if left unattended    [x] Please notify surgeon if you develop any illness between now and time of surgery (cold, cough, sore throat, fever, nausea, vomiting) or any signs of infections  including skin, wounds, and dental.    []  The Outpatient Pharmacy is available to fill your prescription here on your day of surgery, ask your preop nurse for details    [] Other instructions    EDUCATIONAL MATERIALS PROVIDED:    [] PAT Preoperative Education Packet/Booklet     [] Medication List    [] Transfusion bracelet applied with instructions    [] Shower with soap, lather and rinse well, and use CHG wipes provided the evening before surgery as instructed    [] Incentive spirometer with instructions   Adequate: hears normal conversation without difficulty

## 2024-11-25 NOTE — DISCHARGE NOTE PROVIDER - PROVIDER TOKENS
FENOFIBRATE 145 MG Oral Tab   This was sent to  [facility] in [place] on 11/4  Patient states they are in [place]  Can this be re-sent to:     [id number] - [place], [place] - [address] [address] [facility] [address] [facility] [address] [facility] AT [facility], [phone], [phone] [address] [address] [address] [facility] [address]   Phone: [phone] Fax: [phone]   Hours: Not open 24 hours      PROVIDER:[TOKEN:[[id number]]],PROVIDER:[TOKEN:[[id number]]] PROVIDER:[TOKEN:[[id number]]],PROVIDER:[TOKEN:[[id number]]],PROVIDER:[TOKEN:[[id number]]]

## 2025-03-03 NOTE — HISTORY OF PRESENT ILLNESS
[FreeTextEntry1] : Mr. Riddle is a 65 year old male with recurrent cholangiocarcinoma who previously underwent partial hepatectomy/cholecystectomy including the left lobe of the liver, gallbladder, extrahepatic bile duct, and hepaticojejunostomy to the Ju-en-Y with portal vein reconstruction by saphenous vein graft, with negative hepatic margin and lymph node sampling. He was seen over the summer for recurrent disease and was recommended SBRT. However, patient had decided to hold off of further chemotherapy and radiation therapy at the time. He had repeat CT C/A/P with increased size of the retroperitoneal brian mass encasing the hepatic artery 5x4.1 cm. Small left 0.8 mm supraclavicular lymph nodes was biopsy proven negative. He is now s/p 3 cycles of Gemzar/ Abraxane. \par \par Patient had chest pain and was evaluated at [facility]. Found to have a 7cm mass in anterior mediastinum with plan for followup of mass with subsequent CT. This does not represent metastatic disease as it was not present in our imaging 2 weeks prior.\par \par Patient present today for consent signing and CT simulation. 
DISPLAY PLAN FREE TEXT